# Patient Record
Sex: MALE | Race: BLACK OR AFRICAN AMERICAN | Employment: UNEMPLOYED | URBAN - METROPOLITAN AREA
[De-identification: names, ages, dates, MRNs, and addresses within clinical notes are randomized per-mention and may not be internally consistent; named-entity substitution may affect disease eponyms.]

---

## 2023-01-02 ENCOUNTER — APPOINTMENT (OUTPATIENT)
Dept: GENERAL RADIOLOGY | Age: 53
DRG: 243 | End: 2023-01-02
Payer: COMMERCIAL

## 2023-01-02 ENCOUNTER — HOSPITAL ENCOUNTER (INPATIENT)
Age: 53
LOS: 1 days | Discharge: HOME | DRG: 243 | End: 2023-01-05
Attending: EMERGENCY MEDICINE | Admitting: INTERNAL MEDICINE
Payer: COMMERCIAL

## 2023-01-02 ENCOUNTER — APPOINTMENT (OUTPATIENT)
Dept: CT IMAGING | Age: 53
DRG: 243 | End: 2023-01-02
Payer: COMMERCIAL

## 2023-01-02 DIAGNOSIS — R11.2 INTRACTABLE NAUSEA AND VOMITING: ICD-10-CM

## 2023-01-02 DIAGNOSIS — R06.6 INTRACTABLE HICCUPS: Primary | ICD-10-CM

## 2023-01-02 DIAGNOSIS — E87.6 HYPOKALEMIA: ICD-10-CM

## 2023-01-02 DIAGNOSIS — G96.08 SUBDURAL HYGROMA: ICD-10-CM

## 2023-01-02 DIAGNOSIS — E87.1 HYPONATREMIA: ICD-10-CM

## 2023-01-02 LAB
ALBUMIN SERPL-MCNC: 4.2 G/DL (ref 3.4–5)
ALP BLD-CCNC: 80 U/L (ref 40–129)
ALT SERPL-CCNC: 8 U/L (ref 10–40)
ANION GAP SERPL CALCULATED.3IONS-SCNC: 17 MMOL/L (ref 3–16)
AST SERPL-CCNC: 16 U/L (ref 15–37)
BACTERIA: ABNORMAL /HPF
BASOPHILS ABSOLUTE: 0.1 K/UL (ref 0–0.2)
BASOPHILS RELATIVE PERCENT: 1.3 %
BILIRUB SERPL-MCNC: 0.5 MG/DL (ref 0–1)
BILIRUBIN DIRECT: <0.2 MG/DL (ref 0–0.3)
BILIRUBIN URINE: NEGATIVE
BILIRUBIN, INDIRECT: ABNORMAL MG/DL (ref 0–1)
BLOOD, URINE: NEGATIVE
BUN BLDV-MCNC: 3 MG/DL (ref 7–20)
CALCIUM SERPL-MCNC: 8.8 MG/DL (ref 8.3–10.6)
CHLORIDE BLD-SCNC: 93 MMOL/L (ref 99–110)
CLARITY: CLEAR
CO2: 19 MMOL/L (ref 21–32)
COLOR: YELLOW
CREAT SERPL-MCNC: 0.9 MG/DL (ref 0.9–1.3)
EKG DIAGNOSIS: NORMAL
EKG Q-T INTERVAL: 368 MS
EKG QRS DURATION: 90 MS
EKG QTC CALCULATION (BAZETT): 479 MS
EKG R AXIS: 58 DEGREES
EKG T AXIS: 96 DEGREES
EKG VENTRICULAR RATE: 102 BPM
EOSINOPHILS ABSOLUTE: 0.1 K/UL (ref 0–0.6)
EOSINOPHILS RELATIVE PERCENT: 1 %
EPITHELIAL CELLS, UA: ABNORMAL /HPF (ref 0–5)
GFR SERPL CREATININE-BSD FRML MDRD: >60 ML/MIN/{1.73_M2}
GLUCOSE BLD-MCNC: 108 MG/DL (ref 70–99)
GLUCOSE URINE: NEGATIVE MG/DL
HCT VFR BLD CALC: 41.9 % (ref 40.5–52.5)
HEMOGLOBIN: 13.7 G/DL (ref 13.5–17.5)
KETONES, URINE: NEGATIVE MG/DL
LACTIC ACID: 4.2 MMOL/L (ref 0.4–2)
LEUKOCYTE ESTERASE, URINE: ABNORMAL
LIPASE: 25 U/L (ref 13–60)
LYMPHOCYTES ABSOLUTE: 2.5 K/UL (ref 1–5.1)
LYMPHOCYTES RELATIVE PERCENT: 27.7 %
MAGNESIUM: 2 MG/DL (ref 1.8–2.4)
MCH RBC QN AUTO: 30.5 PG (ref 26–34)
MCHC RBC AUTO-ENTMCNC: 32.8 G/DL (ref 31–36)
MCV RBC AUTO: 93.1 FL (ref 80–100)
MICROSCOPIC EXAMINATION: YES
MONOCYTES ABSOLUTE: 1 K/UL (ref 0–1.3)
MONOCYTES RELATIVE PERCENT: 11.3 %
MUCUS: ABNORMAL /LPF
NEUTROPHILS ABSOLUTE: 5.2 K/UL (ref 1.7–7.7)
NEUTROPHILS RELATIVE PERCENT: 58.7 %
NITRITE, URINE: NEGATIVE
PDW BLD-RTO: 16 % (ref 12.4–15.4)
PH UA: 6 (ref 5–8)
PLATELET # BLD: 302 K/UL (ref 135–450)
PMV BLD AUTO: 8.2 FL (ref 5–10.5)
POTASSIUM REFLEX MAGNESIUM: 3.1 MMOL/L (ref 3.5–5.1)
PRO-BNP: 212 PG/ML (ref 0–124)
PROTEIN UA: NEGATIVE MG/DL
RBC # BLD: 4.5 M/UL (ref 4.2–5.9)
RBC UA: ABNORMAL /HPF (ref 0–4)
SODIUM BLD-SCNC: 129 MMOL/L (ref 136–145)
SPECIFIC GRAVITY UA: <=1.005 (ref 1–1.03)
TOTAL PROTEIN: 8 G/DL (ref 6.4–8.2)
TROPONIN: <0.01 NG/ML
URINE REFLEX TO CULTURE: ABNORMAL
URINE TYPE: ABNORMAL
UROBILINOGEN, URINE: 0.2 E.U./DL
WBC # BLD: 8.9 K/UL (ref 4–11)
WBC UA: ABNORMAL /HPF (ref 0–5)

## 2023-01-02 PROCEDURE — C9113 INJ PANTOPRAZOLE SODIUM, VIA: HCPCS | Performed by: INTERNAL MEDICINE

## 2023-01-02 PROCEDURE — 36415 COLL VENOUS BLD VENIPUNCTURE: CPT

## 2023-01-02 PROCEDURE — G0378 HOSPITAL OBSERVATION PER HR: HCPCS

## 2023-01-02 PROCEDURE — 6370000000 HC RX 637 (ALT 250 FOR IP): Performed by: INTERNAL MEDICINE

## 2023-01-02 PROCEDURE — 80048 BASIC METABOLIC PNL TOTAL CA: CPT

## 2023-01-02 PROCEDURE — 2580000003 HC RX 258: Performed by: PHYSICIAN ASSISTANT

## 2023-01-02 PROCEDURE — 6360000002 HC RX W HCPCS: Performed by: INTERNAL MEDICINE

## 2023-01-02 PROCEDURE — 6360000004 HC RX CONTRAST MEDICATION: Performed by: EMERGENCY MEDICINE

## 2023-01-02 PROCEDURE — 83880 ASSAY OF NATRIURETIC PEPTIDE: CPT

## 2023-01-02 PROCEDURE — 85025 COMPLETE CBC W/AUTO DIFF WBC: CPT

## 2023-01-02 PROCEDURE — 6370000000 HC RX 637 (ALT 250 FOR IP): Performed by: NURSE PRACTITIONER

## 2023-01-02 PROCEDURE — 83690 ASSAY OF LIPASE: CPT

## 2023-01-02 PROCEDURE — 80076 HEPATIC FUNCTION PANEL: CPT

## 2023-01-02 PROCEDURE — 71260 CT THORAX DX C+: CPT

## 2023-01-02 PROCEDURE — 70450 CT HEAD/BRAIN W/O DYE: CPT

## 2023-01-02 PROCEDURE — 71045 X-RAY EXAM CHEST 1 VIEW: CPT

## 2023-01-02 PROCEDURE — 99285 EMERGENCY DEPT VISIT HI MDM: CPT

## 2023-01-02 PROCEDURE — 83735 ASSAY OF MAGNESIUM: CPT

## 2023-01-02 PROCEDURE — 2580000003 HC RX 258: Performed by: INTERNAL MEDICINE

## 2023-01-02 PROCEDURE — 96375 TX/PRO/DX INJ NEW DRUG ADDON: CPT

## 2023-01-02 PROCEDURE — 83605 ASSAY OF LACTIC ACID: CPT

## 2023-01-02 PROCEDURE — 96366 THER/PROPH/DIAG IV INF ADDON: CPT

## 2023-01-02 PROCEDURE — 6360000002 HC RX W HCPCS: Performed by: PHYSICIAN ASSISTANT

## 2023-01-02 PROCEDURE — 81001 URINALYSIS AUTO W/SCOPE: CPT

## 2023-01-02 PROCEDURE — 6370000000 HC RX 637 (ALT 250 FOR IP): Performed by: PHYSICIAN ASSISTANT

## 2023-01-02 PROCEDURE — 93005 ELECTROCARDIOGRAM TRACING: CPT | Performed by: EMERGENCY MEDICINE

## 2023-01-02 PROCEDURE — 84484 ASSAY OF TROPONIN QUANT: CPT

## 2023-01-02 PROCEDURE — 96365 THER/PROPH/DIAG IV INF INIT: CPT

## 2023-01-02 RX ORDER — SUCRALFATE 1 G/1
1 TABLET ORAL EVERY 6 HOURS SCHEDULED
Status: DISCONTINUED | OUTPATIENT
Start: 2023-01-02 | End: 2023-01-05 | Stop reason: HOSPADM

## 2023-01-02 RX ORDER — SODIUM CHLORIDE 0.9 % (FLUSH) 0.9 %
5-40 SYRINGE (ML) INJECTION PRN
Status: DISCONTINUED | OUTPATIENT
Start: 2023-01-02 | End: 2023-01-05 | Stop reason: HOSPADM

## 2023-01-02 RX ORDER — BACLOFEN 10 MG/1
TABLET ORAL
Status: ON HOLD | COMMUNITY
Start: 2022-11-16 | End: 2023-01-05 | Stop reason: HOSPADM

## 2023-01-02 RX ORDER — SODIUM CHLORIDE 9 MG/ML
INJECTION, SOLUTION INTRAVENOUS PRN
Status: DISCONTINUED | OUTPATIENT
Start: 2023-01-02 | End: 2023-01-05 | Stop reason: HOSPADM

## 2023-01-02 RX ORDER — PANTOPRAZOLE SODIUM 40 MG/1
TABLET, DELAYED RELEASE ORAL
Status: ON HOLD | COMMUNITY
Start: 2022-11-16 | End: 2023-01-05 | Stop reason: SDUPTHER

## 2023-01-02 RX ORDER — ENOXAPARIN SODIUM 100 MG/ML
40 INJECTION SUBCUTANEOUS DAILY
Status: DISCONTINUED | OUTPATIENT
Start: 2023-01-03 | End: 2023-01-05 | Stop reason: HOSPADM

## 2023-01-02 RX ORDER — ACETAMINOPHEN 325 MG/1
650 TABLET ORAL EVERY 6 HOURS PRN
Status: DISCONTINUED | OUTPATIENT
Start: 2023-01-02 | End: 2023-01-05 | Stop reason: HOSPADM

## 2023-01-02 RX ORDER — POTASSIUM CHLORIDE 7.45 MG/ML
10 INJECTION INTRAVENOUS PRN
Status: DISCONTINUED | OUTPATIENT
Start: 2023-01-02 | End: 2023-01-05 | Stop reason: HOSPADM

## 2023-01-02 RX ORDER — POTASSIUM CHLORIDE 20 MEQ/1
40 TABLET, EXTENDED RELEASE ORAL PRN
Status: DISCONTINUED | OUTPATIENT
Start: 2023-01-02 | End: 2023-01-05 | Stop reason: HOSPADM

## 2023-01-02 RX ORDER — SODIUM CHLORIDE, SODIUM LACTATE, POTASSIUM CHLORIDE, AND CALCIUM CHLORIDE .6; .31; .03; .02 G/100ML; G/100ML; G/100ML; G/100ML
1000 INJECTION, SOLUTION INTRAVENOUS ONCE
Status: COMPLETED | OUTPATIENT
Start: 2023-01-02 | End: 2023-01-02

## 2023-01-02 RX ORDER — POLYETHYLENE GLYCOL 3350 17 G/17G
17 POWDER, FOR SOLUTION ORAL DAILY PRN
Status: DISCONTINUED | OUTPATIENT
Start: 2023-01-02 | End: 2023-01-05 | Stop reason: HOSPADM

## 2023-01-02 RX ORDER — ACETAMINOPHEN 650 MG/1
650 SUPPOSITORY RECTAL EVERY 6 HOURS PRN
Status: DISCONTINUED | OUTPATIENT
Start: 2023-01-02 | End: 2023-01-05 | Stop reason: HOSPADM

## 2023-01-02 RX ORDER — PANTOPRAZOLE SODIUM 40 MG/10ML
40 INJECTION, POWDER, LYOPHILIZED, FOR SOLUTION INTRAVENOUS 2 TIMES DAILY
Status: DISCONTINUED | OUTPATIENT
Start: 2023-01-02 | End: 2023-01-05 | Stop reason: HOSPADM

## 2023-01-02 RX ORDER — SODIUM CHLORIDE, SODIUM LACTATE, POTASSIUM CHLORIDE, CALCIUM CHLORIDE 600; 310; 30; 20 MG/100ML; MG/100ML; MG/100ML; MG/100ML
INJECTION, SOLUTION INTRAVENOUS CONTINUOUS
Status: DISCONTINUED | OUTPATIENT
Start: 2023-01-02 | End: 2023-01-05 | Stop reason: HOSPADM

## 2023-01-02 RX ORDER — AMLODIPINE BESYLATE 10 MG/1
TABLET ORAL
Status: ON HOLD | COMMUNITY
Start: 2022-10-03 | End: 2023-01-05 | Stop reason: HOSPADM

## 2023-01-02 RX ORDER — MAGNESIUM SULFATE IN WATER 40 MG/ML
2000 INJECTION, SOLUTION INTRAVENOUS PRN
Status: DISCONTINUED | OUTPATIENT
Start: 2023-01-02 | End: 2023-01-05 | Stop reason: HOSPADM

## 2023-01-02 RX ORDER — ONDANSETRON 2 MG/ML
4 INJECTION INTRAMUSCULAR; INTRAVENOUS EVERY 6 HOURS PRN
Status: DISCONTINUED | OUTPATIENT
Start: 2023-01-02 | End: 2023-01-05 | Stop reason: HOSPADM

## 2023-01-02 RX ORDER — BACLOFEN 5 MG/1
5 TABLET ORAL ONCE
Status: COMPLETED | OUTPATIENT
Start: 2023-01-02 | End: 2023-01-02

## 2023-01-02 RX ORDER — POTASSIUM CHLORIDE 20 MEQ/1
40 TABLET, EXTENDED RELEASE ORAL ONCE
Status: COMPLETED | OUTPATIENT
Start: 2023-01-02 | End: 2023-01-02

## 2023-01-02 RX ORDER — SODIUM CHLORIDE 0.9 % (FLUSH) 0.9 %
5-40 SYRINGE (ML) INJECTION EVERY 12 HOURS SCHEDULED
Status: DISCONTINUED | OUTPATIENT
Start: 2023-01-02 | End: 2023-01-05 | Stop reason: HOSPADM

## 2023-01-02 RX ORDER — ONDANSETRON 4 MG/1
4 TABLET, ORALLY DISINTEGRATING ORAL EVERY 8 HOURS PRN
Status: DISCONTINUED | OUTPATIENT
Start: 2023-01-02 | End: 2023-01-05 | Stop reason: HOSPADM

## 2023-01-02 RX ORDER — VALSARTAN 160 MG/1
TABLET ORAL
Status: ON HOLD | COMMUNITY
Start: 2022-10-03 | End: 2023-01-05 | Stop reason: HOSPADM

## 2023-01-02 RX ORDER — BACLOFEN 10 MG/1
5 TABLET ORAL 3 TIMES DAILY
Status: DISCONTINUED | OUTPATIENT
Start: 2023-01-02 | End: 2023-01-05 | Stop reason: HOSPADM

## 2023-01-02 RX ADMIN — BACLOFEN 5 MG: 5 TABLET ORAL at 18:48

## 2023-01-02 RX ADMIN — BACLOFEN 5 MG: 10 TABLET ORAL at 22:45

## 2023-01-02 RX ADMIN — IOPAMIDOL 75 ML: 755 INJECTION, SOLUTION INTRAVENOUS at 16:56

## 2023-01-02 RX ADMIN — CHLORPROMAZINE HYDROCHLORIDE 50 MG: 25 INJECTION INTRAMUSCULAR at 16:20

## 2023-01-02 RX ADMIN — SODIUM CHLORIDE, POTASSIUM CHLORIDE, SODIUM LACTATE AND CALCIUM CHLORIDE: 600; 310; 30; 20 INJECTION, SOLUTION INTRAVENOUS at 22:35

## 2023-01-02 RX ADMIN — SODIUM CHLORIDE, PRESERVATIVE FREE 10 ML: 5 INJECTION INTRAVENOUS at 22:36

## 2023-01-02 RX ADMIN — SUCRALFATE 1 G: 1 TABLET ORAL at 20:58

## 2023-01-02 RX ADMIN — SODIUM CHLORIDE, POTASSIUM CHLORIDE, SODIUM LACTATE AND CALCIUM CHLORIDE 1000 ML: 600; 310; 30; 20 INJECTION, SOLUTION INTRAVENOUS at 16:20

## 2023-01-02 RX ADMIN — PANTOPRAZOLE SODIUM 40 MG: 40 INJECTION, POWDER, LYOPHILIZED, FOR SOLUTION INTRAVENOUS at 22:45

## 2023-01-02 RX ADMIN — POTASSIUM CHLORIDE 40 MEQ: 1500 TABLET, EXTENDED RELEASE ORAL at 20:58

## 2023-01-02 ASSESSMENT — ENCOUNTER SYMPTOMS
VOMITING: 1
COUGH: 0
CONSTIPATION: 0
ABDOMINAL PAIN: 1
NAUSEA: 1
RESPIRATORY NEGATIVE: 1
SHORTNESS OF BREATH: 0
DIARRHEA: 0

## 2023-01-02 ASSESSMENT — PAIN - FUNCTIONAL ASSESSMENT: PAIN_FUNCTIONAL_ASSESSMENT: 0-10

## 2023-01-02 ASSESSMENT — PAIN DESCRIPTION - ORIENTATION: ORIENTATION: LEFT

## 2023-01-02 ASSESSMENT — PAIN DESCRIPTION - LOCATION: LOCATION: ABDOMEN;CHEST

## 2023-01-02 ASSESSMENT — PAIN SCALES - GENERAL: PAINLEVEL_OUTOF10: 6

## 2023-01-02 ASSESSMENT — PAIN DESCRIPTION - DESCRIPTORS: DESCRIPTORS: ACHING;BURNING;DISCOMFORT

## 2023-01-02 NOTE — ED PROVIDER NOTES
[address] ENCOUNTER          PHYSICIAN ASSISTANT NOTE       Date of evaluation: 1/2/2023    Chief Complaint     Emesis (Presents to ED with n/v on going for the for about 6months. States has been evaluated for before. States when lays down and gets up will become nauseated and throw up. ) and Chest Pain (Intermittent radiating pain in to left arm that Woke pt. Up last evening with nausea )      History of Present Illness     Caden Castro is a 50 y.o. male who presents for hiccups. Patient has had hiccups ongoing for months. Patient has associated nausea, vomiting, chest pain, abdominal pain. Patient has been seen for this in the past and was admitted in November and had an esophagram that showed a possible stricture. GI then did an EGD which showed esophagitis, small hiatal hernia, gastric ulcer without bleeding. The patient was instructed to take a PPI which she reports he is taking. Patient reports despite this the symptoms have never improved. HPI is limited due to medical condition. Review of Systems     Review of Systems   Constitutional: Negative. Negative for fever. Respiratory: Negative. Negative for cough and shortness of breath. Cardiovascular:  Positive for chest pain. Gastrointestinal:  Positive for abdominal pain, nausea and vomiting. Negative for constipation and diarrhea. Musculoskeletal: Negative. Skin: Negative. Neurological: Negative. Psychiatric/Behavioral: Negative. All other systems reviewed and are negative. Past Medical, Surgical, Family, and Social History     He has no past medical history on file. He has no past surgical history on file. His family history is not on file. He     Medications     Previous Medications    No medications on file       Allergies     He has No Known Allergies.     Physical Exam     INITIAL VITALS: BP: (!) 180/121, Temp: 98.6 °F (37 °C), Heart Rate: (!) 104, Resp: 26, SpO2: 99 %  Physical Exam  Vitals and nursing note reviewed. Constitutional:       Appearance: He is ill-appearing. HENT:      Head: Normocephalic and atraumatic. Cardiovascular:      Rate and Rhythm: Regular rhythm. Tachycardia present. Pulmonary:      Effort: Pulmonary effort is normal.      Breath sounds: Normal breath sounds. Abdominal:      Palpations: Abdomen is soft. Tenderness: There is no rebound. Comments: Tender epigastric region   Musculoskeletal:      Cervical back: Neck supple. Skin:     General: Skin is warm and dry. Neurological:      General: No focal deficit present. Mental Status: He is alert and oriented to person, place, and time. Mental status is at baseline. Psychiatric:         Mood and Affect: Mood normal.       Diagnostic Results     EKG   Interpreted in conjunction with emergency department physician Clemencia Shone, *  Rhythm: normal sinus   Rate: normal  Axis: normal  Ectopy: none  Conduction: normal  ST Segments: no acute change  T Waves: no acute change  Q Waves:nonspecific  Clinical Impression: non-specific EKG  Comparison:  NSR with significant artifact from intractable hiccups    RADIOLOGY:  XR CHEST PORTABLE   Final Result   1. No acute cardiopulmonary findings. CT Head W/O Contrast    (Results Pending)   CT CHEST ABDOMEN PELVIS W CONTRAST Additional Contrast? None    (Results Pending)       LABS:   Results for orders placed or performed during the hospital encounter of 01/02/23   BMP w/ Reflex to MG   Result Value Ref Range    Sodium 129 (L) 136 - 145 mmol/L    Potassium reflex Magnesium 3.1 (L) 3.5 - 5.1 mmol/L    Chloride 93 (L) 99 - 110 mmol/L    CO2 19 (L) 21 - 32 mmol/L    Anion Gap 17 (H) 3 - 16    Glucose 108 (H) 70 - 99 mg/dL    BUN 3 (L) 7 - 20 mg/dL    Creatinine 0.9 0.9 - 1.3 mg/dL    Est, Glom Filt Rate >60 >60    Calcium 8.8 8.3 - 10.6 mg/dL   CBC with Auto Differential   Result Value Ref Range    WBC 8.9 4.0 - 11.0 K/uL    RBC 4.50 4. 20 - 5.90 M/uL    Hemoglobin 13.7 13.5 - 17.5 g/dL    Hematocrit 41.9 40.5 - 52.5 %    MCV 93.1 80.0 - 100.0 fL    MCH 30.5 26.0 - 34.0 pg    MCHC 32.8 31.0 - 36.0 g/dL    RDW 16.0 (H) 12.4 - 15.4 %    Platelets 217 206 - 298 K/uL    MPV 8.2 5.0 - 10.5 fL    Neutrophils % 58.7 %    Lymphocytes % 27.7 %    Monocytes % 11.3 %    Eosinophils % 1.0 %    Basophils % 1.3 %    Neutrophils Absolute 5.2 1.7 - 7.7 K/uL    Lymphocytes Absolute 2.5 1.0 - 5.1 K/uL    Monocytes Absolute 1.0 0.0 - 1.3 K/uL    Eosinophils Absolute 0.1 0.0 - 0.6 K/uL    Basophils Absolute 0.1 0.0 - 0.2 K/uL   Hepatic Function Panel   Result Value Ref Range    Total Protein 8.0 6.4 - 8.2 g/dL    Albumin 4.2 3.4 - 5.0 g/dL    Alkaline Phosphatase 80 40 - 129 U/L    ALT 8 (L) 10 - 40 U/L    AST 16 15 - 37 U/L    Total Bilirubin 0.5 0.0 - 1.0 mg/dL    Bilirubin, Direct <0.2 0.0 - 0.3 mg/dL    Bilirubin, Indirect see below 0.0 - 1.0 mg/dL   Lipase   Result Value Ref Range    Lipase 25.0 13.0 - 60.0 U/L   Lactic Acid   Result Value Ref Range    Lactic Acid 4.2 (HH) 0.4 - 2.0 mmol/L   Troponin   Result Value Ref Range    Troponin <0.01 <0.01 ng/mL   Urinalysis with Reflex to Culture    Specimen: Urine   Result Value Ref Range    Urine Type NotGiven    Brain Natriuretic Peptide   Result Value Ref Range    Pro- (H) 0 - 124 pg/mL   Magnesium   Result Value Ref Range    Magnesium 2.00 1.80 - 2.40 mg/dL   EKG 12 Lead   Result Value Ref Range    Ventricular Rate 102 BPM    QRS Duration 90 ms    Q-T Interval 368 ms    QTc Calculation (Bazett) 479 ms    R Axis 58 degrees    T Axis 96 degrees    Diagnosis       EKG performed in ER and to be interpreted by ER physician. Confirmed by MD, ER ([id number]),  Alba Holland ([phone]) on 1/2/2023 3:53:12 PM       ED BEDSIDE ULTRASOUND:  No results found.     RECENT VITALS:  BP: (!) 180/121, Temp: 98.6 °F (37 °C), Heart Rate: (!) 104, Resp: 26, SpO2: 100 %     Procedures         ED Course     Nursing Notes, Past Medical Hx,Past Surgical Hx, Social Hx, Allergies, and Family Hx were reviewed. The patient was given the following medications:  Orders Placed This Encounter   Medications    chlorproMAZINE (THORAZINE) 50 mg in sodium chloride 0.9 % 50 mL IVPB    lactated ringers bolus    potassium chloride (KLOR-CON M) extended release tablet 40 mEq       CONSULTS:  None    MEDICAL DECISION MAKING / ASSESSMENT / Ree Tobias is a 50 y.o. male with intractable hiccups. Patient appears very uncomfortable. Patient is tachycardic and hypertensive. Oxygenating well on room air. Lungs clear. Epigastric tenderness on exam without peritoneal signs. Thorazine ordered. Patient is able blood counts. Patient has a stable creatinine but has hyponatremia and hypokalemia as well as low chlorine and bicarb on BMP, anion gap of 17. Patient given fluids and oral potassium. Lactic acid is elevated, will recheck after fluids. LFTs and lipase normal. Troponin negative. CT head, chest, abdomen/pelvis ordered. At this time I am going off service and the oncoming provider will follow-up on pending results and disposition the patient. This patient was also evaluated by the attending physician. All care plans were discussed and agreed upon. Clinical Impression     1. Intractable hiccups        Disposition     PATIENT REFERRED TO:  No follow-up provider specified.     DISCHARGE MEDICATIONS:  New Prescriptions    No medications on file       DISPOSITION  pending        Rachna Mckeon PA-C  01/02/23 1258

## 2023-01-02 NOTE — ED PROVIDER NOTES
[address] ENCOUNTER          NURSE PRACTITIONER NOTE       Date of evaluation: 1/2/2023    ADDENDUM:      Care of this patient was assumed from [place], [place]. The patient was seen for Emesis (Presents to ED with n/v on going for the for about 6months. States has been evaluated for before. States when lays down and gets up will become nauseated and throw up. ) and Chest Pain (Intermittent radiating pain in to left arm that Woke pt. Up last evening with nausea )  . The patient's initial evaluation and plan have been discussed with the prior provider who initially evaluated the patient. Nursing Notes, Past Medical Hx, Past Surgical Hx, Social Hx, Allergies, and Family Hx were all reviewed. Diagnostic Results     EKG   See previous providers note    RADIOLOGY:  CT CHEST ABDOMEN PELVIS W CONTRAST Additional Contrast? None   Final Result   1. A large left inguinal hernia is present which contains a portion of sigmoid colon. 2.  A fat-containing umbilical hernia is present. 3.  Fluid is present within the esophagus which may be due to gastroesophageal reflux disease. CT Head W/O Contrast   Final Result   1. No acute intracranial hemorrhage or mass effect. 2.  A subdural hygromas present over the right cerebral convexity. XR CHEST PORTABLE   Final Result   1. No acute cardiopulmonary findings.           LABS:   Results for orders placed or performed during the hospital encounter of 01/02/23   BMP w/ Reflex to MG   Result Value Ref Range    Sodium 129 (L) 136 - 145 mmol/L    Potassium reflex Magnesium 3.1 (L) 3.5 - 5.1 mmol/L    Chloride 93 (L) 99 - 110 mmol/L    CO2 19 (L) 21 - 32 mmol/L    Anion Gap 17 (H) 3 - 16    Glucose 108 (H) 70 - 99 mg/dL    BUN 3 (L) 7 - 20 mg/dL    Creatinine 0.9 0.9 - 1.3 mg/dL    Est, Glom Filt Rate >60 >60    Calcium 8.8 8.3 - 10.6 mg/dL   CBC with Auto Differential   Result Value Ref Range    WBC 8.9 4.0 - 11.0 K/uL    RBC 4.50 4.20 - 5.90 M/uL    Hemoglobin 13.7 13.5 - 17.5 g/dL    Hematocrit 41.9 40.5 - 52.5 %    MCV 93.1 80.0 - 100.0 fL    MCH 30.5 26.0 - 34.0 pg    MCHC 32.8 31.0 - 36.0 g/dL    RDW 16.0 (H) 12.4 - 15.4 %    Platelets 623 623 - 098 K/uL    MPV 8.2 5.0 - 10.5 fL    Neutrophils % 58.7 %    Lymphocytes % 27.7 %    Monocytes % 11.3 %    Eosinophils % 1.0 %    Basophils % 1.3 %    Neutrophils Absolute 5.2 1.7 - 7.7 K/uL    Lymphocytes Absolute 2.5 1.0 - 5.1 K/uL    Monocytes Absolute 1.0 0.0 - 1.3 K/uL    Eosinophils Absolute 0.1 0.0 - 0.6 K/uL    Basophils Absolute 0.1 0.0 - 0.2 K/uL   Hepatic Function Panel   Result Value Ref Range    Total Protein 8.0 6.4 - 8.2 g/dL    Albumin 4.2 3.4 - 5.0 g/dL    Alkaline Phosphatase 80 40 - 129 U/L    ALT 8 (L) 10 - 40 U/L    AST 16 15 - 37 U/L    Total Bilirubin 0.5 0.0 - 1.0 mg/dL    Bilirubin, Direct <0.2 0.0 - 0.3 mg/dL    Bilirubin, Indirect see below 0.0 - 1.0 mg/dL   Lipase   Result Value Ref Range    Lipase 25.0 13.0 - 60.0 U/L   Lactic Acid   Result Value Ref Range    Lactic Acid 4.2 (HH) 0.4 - 2.0 mmol/L   Troponin   Result Value Ref Range    Troponin <0.01 <0.01 ng/mL   Urinalysis with Reflex to Culture    Specimen: Urine   Result Value Ref Range    Color, UA Yellow Straw/Yellow    Clarity, UA Clear Clear    Glucose, Ur Negative Negative mg/dL    Bilirubin Urine Negative Negative    Ketones, Urine Negative Negative mg/dL    Specific Gravity, UA <=1.005 1.005 - 1.030    Blood, Urine Negative Negative    pH, UA 6.0 5.0 - 8.0    Protein, UA Negative Negative mg/dL    Urobilinogen, Urine 0.2 <2.0 E.U./dL    Nitrite, Urine Negative Negative    Leukocyte Esterase, Urine TRACE (A) Negative    Microscopic Examination YES     Urine Type NotGiven     Urine Reflex to Culture Not Indicated    Brain Natriuretic Peptide   Result Value Ref Range    Pro- (H) 0 - 124 pg/mL   Magnesium   Result Value Ref Range    Magnesium 2.00 1.80 - 2.40 mg/dL   Microscopic Urinalysis   Result Value Ref Range    Mucus, UA 1+ (A) None Seen /LPF    WBC, UA 0-2 0 - 5 /HPF    RBC, UA None seen 0 - 4 /HPF    Epithelial Cells, UA 2-5 0 - 5 /HPF    Bacteria, UA 1+ (A) None Seen /HPF   EKG 12 Lead   Result Value Ref Range    Ventricular Rate 102 BPM    QRS Duration 90 ms    Q-T Interval 368 ms    QTc Calculation (Bazett) 479 ms    R Axis 58 degrees    T Axis 96 degrees    Diagnosis       EKG performed in ER and to be interpreted by ER physician. Confirmed by MD, ER ([id number]),  Penny Covarrubias ([phone]) on 1/2/2023 3:53:12 PM       ED BEDSIDE ULTRASOUND:  No results found. RECENT VITALS:  BP: (!) 180/121, Temp: 98.6 °F (37 °C), Heart Rate: (!) 104, Resp: 26, SpO2: 100 %       ED Course          The patient was given the following medications:  Orders Placed This Encounter   Medications    chlorproMAZINE (THORAZINE) 50 mg in sodium chloride 0.9 % 50 mL IVPB    lactated ringers bolus    potassium chloride (KLOR-CON M) extended release tablet 40 mEq    iopamidol (ISOVUE-370) 76 % injection 75 mL    Baclofen (LIORESAL) tablet 5 mg       CONSULTS:  IP CONSULT TO HOSPITALIST    MEDICAL DECISION MAKING / ASSESSMENT / Omid Bowen is a 50 y.o. male who presented to the emerged department for evaluation of intractable hiccups x6 months, with associated left-sided chest pain, decreased oral intake with ongoing nausea and vomiting. Patient was admitted for this at an outside facility on 11/16/2022 at which time he had an EGD done with GI, was noted to have esophagitis and started on appropriate medications. Patient continues to have symptoms. On laboratory evaluation here patient is noted to have multiple electrolyte derangements including sodium of 129, potassium of 3.1, CO2 of 19, anion gap of 17. He was provided with IV fluids, given 40 mill equivalents of potassium, and Thorazine injection for treatment of intractable hiccups. CT scans of the head, chest abdomen and pelvis were ordered for further evaluation. Laboratory evaluations otherwise reassuring. Patient was turned over to me pending reassessment/symptom control, CT findings and ultimate disposition. Head CT with findings for subdural hygroma otherwise unremarkable  CT scans of the chest/abdomen/pelvis show a large left inguinal hernia which contains a portion of the sigmoid colon, is fat-containing umbilical hernia, fluid present within the esophagus which may be related to GERD. On reassessment, patient did not have any relief from the Thorazine. On chart review, appears to have had relief at [facility] with baclofen in the past; was ordered this. At this point I feel patient warrants admission for ongoing evaluation management of intractable hiccups,    Patient will be admitted to the hospitalist service for ongoing evaluation and management of intractable hiccups, intractable nausea vomiting, electrolyte derangements, subdural hygroma. This patient was also evaluated by the attending physician. All care plans were discussed and agreed upon. Clinical Impression     1. Intractable hiccups    2. Intractable nausea and vomiting    3. Hyponatremia    4. Hypokalemia    5.  Subdural hygroma        Disposition       DISPOSITION Decision To Admit 01/02/2023 06:14:46 PM        EDY Rodriguez CNP  01/02/23 1942

## 2023-01-02 NOTE — ED PROVIDER NOTES
ED Attending Attestation Note     Date of evaluation: 1/2/2023    This patient was seen by the advance practice provider. I have seen and examined the patient, agree with the workup, evaluation, management and diagnosis. The care plan has been discussed. I have reviewed the ECG and concur with the [facility]'s interpretation. My assessment reveals adult male who presents with persistent hiccups, nausea especially when he lays down reported inability to take p.o. either solids or liquids for the last 3 days. His abdomen is soft and benign, but he does appear clinically dry, and he was noted to have multiple electrolyte derangements that would support this. We did scan his head given his reported persistent nausea but no obvious acute findings are noted. Is a nonobstructed inguinal hernia and does not appear obstructed on his abdominal exam or in his general symptoms. He remains unable to take any significant p.o for any length of time, and subsequently I do not think he would be a good candidate for electrolyte repletion and rehydration at home.          Judson Li MD  01/02/23 4982

## 2023-01-03 LAB
A/G RATIO: 1.1 (ref 1.1–2.2)
ALBUMIN SERPL-MCNC: 3.6 G/DL (ref 3.4–5)
ALP BLD-CCNC: 71 U/L (ref 40–129)
ALT SERPL-CCNC: 8 U/L (ref 10–40)
ANION GAP SERPL CALCULATED.3IONS-SCNC: 11 MMOL/L (ref 3–16)
AST SERPL-CCNC: 16 U/L (ref 15–37)
BASOPHILS ABSOLUTE: 0.1 K/UL (ref 0–0.2)
BASOPHILS RELATIVE PERCENT: 1.6 %
BILIRUB SERPL-MCNC: 1 MG/DL (ref 0–1)
BUN BLDV-MCNC: 4 MG/DL (ref 7–20)
CALCIUM SERPL-MCNC: 8.9 MG/DL (ref 8.3–10.6)
CHLORIDE BLD-SCNC: 101 MMOL/L (ref 99–110)
CO2: 25 MMOL/L (ref 21–32)
CREAT SERPL-MCNC: 1 MG/DL (ref 0.9–1.3)
EOSINOPHILS ABSOLUTE: 0.1 K/UL (ref 0–0.6)
EOSINOPHILS RELATIVE PERCENT: 2.2 %
GFR SERPL CREATININE-BSD FRML MDRD: >60 ML/MIN/{1.73_M2}
GLUCOSE BLD-MCNC: 103 MG/DL (ref 70–99)
HCT VFR BLD CALC: 38.7 % (ref 40.5–52.5)
HEMOGLOBIN: 12.7 G/DL (ref 13.5–17.5)
LACTIC ACID: 2 MMOL/L (ref 0.4–2)
LYMPHOCYTES ABSOLUTE: 1.7 K/UL (ref 1–5.1)
LYMPHOCYTES RELATIVE PERCENT: 29.5 %
MCH RBC QN AUTO: 30.3 PG (ref 26–34)
MCHC RBC AUTO-ENTMCNC: 32.8 G/DL (ref 31–36)
MCV RBC AUTO: 92.5 FL (ref 80–100)
MONOCYTES ABSOLUTE: 0.7 K/UL (ref 0–1.3)
MONOCYTES RELATIVE PERCENT: 12.3 %
NEUTROPHILS ABSOLUTE: 3.2 K/UL (ref 1.7–7.7)
NEUTROPHILS RELATIVE PERCENT: 54.4 %
PDW BLD-RTO: 16.3 % (ref 12.4–15.4)
PLATELET # BLD: 249 K/UL (ref 135–450)
PMV BLD AUTO: 8 FL (ref 5–10.5)
POTASSIUM REFLEX MAGNESIUM: 4.2 MMOL/L (ref 3.5–5.1)
RBC # BLD: 4.18 M/UL (ref 4.2–5.9)
SODIUM BLD-SCNC: 137 MMOL/L (ref 136–145)
TOTAL PROTEIN: 6.8 G/DL (ref 6.4–8.2)
WBC # BLD: 5.9 K/UL (ref 4–11)

## 2023-01-03 PROCEDURE — G0378 HOSPITAL OBSERVATION PER HR: HCPCS

## 2023-01-03 PROCEDURE — 6370000000 HC RX 637 (ALT 250 FOR IP): Performed by: INTERNAL MEDICINE

## 2023-01-03 PROCEDURE — 83605 ASSAY OF LACTIC ACID: CPT

## 2023-01-03 PROCEDURE — 85025 COMPLETE CBC W/AUTO DIFF WBC: CPT

## 2023-01-03 PROCEDURE — 2580000003 HC RX 258: Performed by: INTERNAL MEDICINE

## 2023-01-03 PROCEDURE — 96376 TX/PRO/DX INJ SAME DRUG ADON: CPT

## 2023-01-03 PROCEDURE — 80053 COMPREHEN METABOLIC PANEL: CPT

## 2023-01-03 PROCEDURE — C9113 INJ PANTOPRAZOLE SODIUM, VIA: HCPCS | Performed by: INTERNAL MEDICINE

## 2023-01-03 PROCEDURE — 96372 THER/PROPH/DIAG INJ SC/IM: CPT

## 2023-01-03 PROCEDURE — 36415 COLL VENOUS BLD VENIPUNCTURE: CPT

## 2023-01-03 PROCEDURE — 6360000002 HC RX W HCPCS: Performed by: INTERNAL MEDICINE

## 2023-01-03 RX ORDER — AMLODIPINE BESYLATE 10 MG/1
10 TABLET ORAL DAILY
Status: DISCONTINUED | OUTPATIENT
Start: 2023-01-03 | End: 2023-01-05 | Stop reason: HOSPADM

## 2023-01-03 RX ORDER — CHLORPROMAZINE HYDROCHLORIDE 25 MG/1
25 TABLET, FILM COATED ORAL 3 TIMES DAILY
Status: DISCONTINUED | OUTPATIENT
Start: 2023-01-03 | End: 2023-01-05 | Stop reason: HOSPADM

## 2023-01-03 RX ADMIN — SUCRALFATE 1 G: 1 TABLET ORAL at 12:35

## 2023-01-03 RX ADMIN — CHLORPROMAZINE HYDROCHLORIDE 25 MG: 25 TABLET, FILM COATED ORAL at 16:05

## 2023-01-03 RX ADMIN — PANTOPRAZOLE SODIUM 40 MG: 40 INJECTION, POWDER, LYOPHILIZED, FOR SOLUTION INTRAVENOUS at 22:02

## 2023-01-03 RX ADMIN — BACLOFEN 5 MG: 10 TABLET ORAL at 22:01

## 2023-01-03 RX ADMIN — PANTOPRAZOLE SODIUM 40 MG: 40 INJECTION, POWDER, LYOPHILIZED, FOR SOLUTION INTRAVENOUS at 08:20

## 2023-01-03 RX ADMIN — SODIUM CHLORIDE, POTASSIUM CHLORIDE, SODIUM LACTATE AND CALCIUM CHLORIDE: 600; 310; 30; 20 INJECTION, SOLUTION INTRAVENOUS at 05:32

## 2023-01-03 RX ADMIN — AMLODIPINE BESYLATE 10 MG: 10 TABLET ORAL at 09:15

## 2023-01-03 RX ADMIN — SUCRALFATE 1 G: 1 TABLET ORAL at 01:12

## 2023-01-03 RX ADMIN — CHLORPROMAZINE HYDROCHLORIDE 25 MG: 25 TABLET, FILM COATED ORAL at 12:23

## 2023-01-03 RX ADMIN — SODIUM CHLORIDE, PRESERVATIVE FREE 10 ML: 5 INJECTION INTRAVENOUS at 22:03

## 2023-01-03 RX ADMIN — SUCRALFATE 1 G: 1 TABLET ORAL at 18:18

## 2023-01-03 RX ADMIN — SODIUM CHLORIDE, PRESERVATIVE FREE 10 ML: 5 INJECTION INTRAVENOUS at 08:21

## 2023-01-03 RX ADMIN — CHLORPROMAZINE HYDROCHLORIDE 25 MG: 25 TABLET, FILM COATED ORAL at 22:01

## 2023-01-03 RX ADMIN — BACLOFEN 5 MG: 10 TABLET ORAL at 08:19

## 2023-01-03 RX ADMIN — SUCRALFATE 1 G: 1 TABLET ORAL at 05:33

## 2023-01-03 RX ADMIN — SODIUM CHLORIDE, POTASSIUM CHLORIDE, SODIUM LACTATE AND CALCIUM CHLORIDE: 600; 310; 30; 20 INJECTION, SOLUTION INTRAVENOUS at 19:31

## 2023-01-03 RX ADMIN — ENOXAPARIN SODIUM 40 MG: 100 INJECTION SUBCUTANEOUS at 08:20

## 2023-01-03 RX ADMIN — BACLOFEN 5 MG: 10 TABLET ORAL at 14:47

## 2023-01-03 ASSESSMENT — PAIN SCALES - GENERAL: PAINLEVEL_OUTOF10: 3

## 2023-01-03 ASSESSMENT — PAIN DESCRIPTION - LOCATION: LOCATION: ABDOMEN

## 2023-01-03 ASSESSMENT — PAIN DESCRIPTION - ORIENTATION: ORIENTATION: MID

## 2023-01-03 ASSESSMENT — PAIN DESCRIPTION - DESCRIPTORS: DESCRIPTORS: ACHING

## 2023-01-03 NOTE — PROGRESS NOTES
4 Eyes Skin Assessment     NAME:  Ginger Zuluaga  YOB: 1970  MEDICAL RECORD NUMBER:  [id number]    The patient is being assessed for  Admission    I agree that One RN have performed a thorough Head to Toe Skin Assessment on the patient. ALL assessment sites listed below have been assessed. Areas assessed by both nurses:    Head, Face, Ears, Shoulders, Back, Chest, Arms, Elbows, Hands, Sacrum. Buttock, Coccyx, Ischium, Legs. Feet and Heels, and Other ***        Does the Patient have a Wound?  No noted wound(s)       Socrates Prevention initiated by RN: Yes   Wound Care Orders initiated by RN: NA    Pressure Injury (Stage 3,4, Unstageable, DTI, NWPT, and Complex wounds) if present place referral order by RN under : No    New and Established Ostomies, if present place, referral order under : No      Nurse 1 eSignature: Electronically signed by Kourtney Bowen RN on 1/3/23 at 2:52 AM EST    **SHARE this note so that the co-signing nurse is able to place an eSignature**    Nurse 2 eSignature: {Esignature:[id number]}

## 2023-01-03 NOTE — CARE COORDINATION
Adam Thorpe, [address] because patient stated that his [employer] insurance  on . Roxy Hannah is checking and calling back. Electronically signed by Jhonny Snow RN on 1/3/2023 at 3:04 PM    Addendum:  Call from Roxy Hannah, [address]. Patient has [employer] until 23 and then, it will turnover on 23. Patient is from home alone. His son and mother live around 2 miles from patient. Patient states, at this time, he does not need any services at discharge. CM will continue to follow patient until discharge. Electronically signed by Jhonny Snow RN on 1/3/2023 at 3:12 PM    Addendum:   Spoke to patient and let him know that his insurance is still covering him. He does not drive and says he needs a ride home at discharge and his family works. Reassured patient that we can provide a LYFT at discharge to get him home. CM will continue to follow patient until discharge.   Electronically signed by Jhonny Snow RN on 1/3/2023 at 3:28 PM

## 2023-01-03 NOTE — PROGRESS NOTES
Progress Note  Admit Date: 1/2/2023       Overnight Events: none noted     CC: F/U for hiccups, n/v, esophagitis  Interval History: feels a little better today, asking for food. Gi consult pending. amLODIPine  10 mg Oral Daily    sodium chloride flush  5-40 mL IntraVENous 2 times per day    enoxaparin  40 mg SubCUTAneous Daily    pantoprazole  40 mg IntraVENous BID    baclofen  5 mg Oral TID    sucralfate  1 g Oral 4 times per day      PRN Medications: sodium chloride flush, sodium chloride, ondansetron **OR** ondansetron, polyethylene glycol, acetaminophen **OR** acetaminophen, potassium chloride **OR** potassium alternative oral replacement **OR** potassium chloride, magnesium sulfate  Diet: ADULT DIET; Clear Liquid  Continuous Infusions:   sodium chloride      lactated ringers 150 mL/hr at 01/03/23 0532     PHYSICAL EXAM:  BP (!) 156/103   Pulse 99   Temp 98.3 °F (36.8 °C) (Oral)   Resp 16   Ht 6' 2.02\" (1.88 m)   Wt 194 lb 14.2 oz (88.4 kg)   SpO2 96%   BMI 25.01 kg/m²   No results for input(s): POCGLU in the last 72 hours. Intake/Output Summary (Last 24 hours) at 1/3/2023 0951  Last data filed at 1/3/2023 4274  Gross per 24 hour   Intake 120 ml   Output 1600 ml   Net -1480 ml       General appearance:  No apparent distress, appears stated age and cooperative. HEENT:  Normal cephalic, atraumatic without obvious deformity. Pupils equal, round, and reactive to light. Extra ocular muscles intact. Conjunctivae/corneas clear. Neck: Supple, with full range of motion. No jugular venous distention. Trachea midline. Respiratory:  Normal respiratory effort. Clear to auscultation, bilaterally without Rales/Wheezes/Rhonchi. Cardiovascular:  Regular rate and rhythm with normal S1/S2 without murmurs, rubs or gallops. Abdomen: Soft, non-tender, non-distended with normal bowel sounds. Musculoskeletal:  No clubbing, cyanosis or edema bilaterally. Full range of motion without deformity.   Skin: Skin color, texture, turgor normal.  No rashes or lesions. Neurologic:  Neurovascularly intact without any focal sensory/motor deficits. Cranial nerves: II-XII intact, grossly non-focal.  Psychiatric:  Alert and oriented, thought content appropriate, normal insight  Capillary Refill: Brisk,3 seconds, normal  Peripheral Pulses: +2 palpable, equal bilaterally       LABS:  Recent Labs     01/02/23  1522 01/03/23  0738   WBC 8.9 5.9   HGB 13.7 12.7*   HCT 41.9 38.7*    249                                                                    Recent Labs     01/02/23  1522 01/03/23  0738   * 137   K 3.1* 4.2   CL 93* 101   CO2 19* 25   BUN 3* 4*   CREATININE 0.9 1.0   GLUCOSE 108* 103*     Recent Labs     01/02/23  1522 01/03/23  0738   AST 16 16   ALT 8* 8*   BILITOT 0.5 1.0   ALKPHOS 80 71     Recent Labs     01/02/23  1522   TROPONINI <0.01     No results for input(s): BNP in the last 72 hours. No results for input(s): CHOL, HDL in the last 72 hours. Invalid input(s): LDLCALCU  No results for input(s): INR in the last 72 hours. Assessment & Plan:    Patient Active Problem List:     1. Intractable nausea and vomiting -  imptoved per pt report. He states it's better because \"I'm not moving. \" He is asking for regular michaela, currently clears ordered. Awaiting GI recs, will advance diet after recs from GI.  2. Hiccups: seem to be improved by watching pt but he states they are still ongoing. Pt states they have been going on for 6 months. He does not believe he has ever been given thorazine for this. Will start and monitor for response while awaiting gi inut. 3. Electrolyte abnormalities: have been replaced, monitor. The patient and / or the family were informed of the results of any tests, a time was given to answer questions, a plan was proposed and they agreed with plan.   Disposition: 1-2 days  Full Code

## 2023-01-03 NOTE — PLAN OF CARE
Pt denies nausea today, no vomiting noted. Pt tolerating regular diet. Pt continues to have hiccups. Pt compliant with meds ordered by physician. Will cont to monitor GI status.

## 2023-01-03 NOTE — CONSULTS
[address] [place] [address]  GI Consultation                                                                 Patient: Ginger Zuluaga  : 1970       Date:  1/3/2023    Subjective:       History of Present Illness  Patient is a 50 y.o.  male with medical history of persistent hiccups and hypertension admitted with Hypokalemia [E87.6]  Hyponatremia [E87.1]  Intractable hiccups [R06.6]  Subdural hygroma [G96.08]  Intractable nausea and vomiting [R11.2] who is seen in consult for intractable nausea, vomiting and gastric ulcer     Patient reports hiccups has been ongoing for almost a year. Hiccups has been gradually worsening, occurring most of the day. He is unable to sleep at night due to hiccups. He stopped working in the past 2 months due to worsening of symptoms. There is no known relieving factor. He states he has been drinking about 10 cans of pop (pepsi) daily as it the only thing he tolerates. He has not had alcohol in a while as he throws up after drinking and smokes about 4 cigars in a day. He is unable to take both solids and liquids for the past 4 days. He reports a feeling of food being stuck in his chest when he swallows, he reports same feeling to both liquids and solids. He was previously admitted in  and [facility] for the same symptom in the past few months. Had a MBS with distal esophageal stricture in 22 and an EGD in  with esophagitis, small hiatal hernia and gastric ulcer. He was started on thorazine 10 mg tid, baclofen as needed and PPI 40 mg bid. Patient states none of this medications has helped with his symptoms     In the ED, labs were significant for Na 129, K 3.1, Hb 12.7, pro-BNP elevated to 212, LFTs wnl. EKG with QTc 479. CTH with a subdural hygromas present over the right cerebral convexity. No acute ICH or mass effect. CT chest with fluid present in the esophagus which may be as a result of GERD.  He received one dose of baclofen, thorazine which he reports did not improve the hiccups. Patient reports he vomited last yesterday. He is willing to try the regular diet today and see how he does. He has had a BM today. History reviewed. No pertinent past medical history. No past surgical history on file. Past Endoscopic History     EGD 11/16/22  Esophagitis, Small hiatal hernia, and 4 mm ulcer in the gastric cardia. EGD 10/03/22 with grade 3 hiatal hernia, tortuous esophagus and candida esophagitis. Admission Meds  No current facility-administered medications on file prior to encounter. Current Outpatient Medications on File Prior to Encounter   Medication Sig Dispense Refill    baclofen (LIORESAL) 10 MG tablet       pantoprazole (PROTONIX) 40 MG tablet       valsartan (DIOVAN) 160 MG tablet       amLODIPine (NORVASC) 10 MG tablet          Patient denies ASA, NSAID use. Allergies  No Known Allergies   Social   Social History     Tobacco Use    Smoking status: Every Day     Packs/day: 1.00     Years: 5.00     Pack years: 5.00     Types: Cigarettes    Smokeless tobacco: Never   Substance Use Topics    Alcohol use: Yes     Alcohol/week: 2.0 standard drinks     Types: 2 Cans of beer per week     Comment: per joy        No family history on file. Review of Systems  Pertinent items are noted in HPI.        Physical Exam    BP (!) 156/103   Pulse 99   Temp 98.3 °F (36.8 °C) (Oral)   Resp 16   Ht 6' 2.02\" (1.88 m)   Wt 194 lb 14.2 oz (88.4 kg)   SpO2 96%   BMI 25.01 kg/m²   General appearance: alert, cooperative, no distress, appears stated age  Anicteric, No Jaundice  Head: Normocephalic, without obvious abnormality  Lungs: clear to auscultation bilaterally, Normal Effort  Heart: regular rate and rhythm, normal S1 and S2, no murmurs or rubs  Abdomen: soft, non-tender; bowel sounds normal; no masses,  no organomegaly  Extremities: atraumatic, no cyanosis or edema  Skin: warm and dry  Neuro: intact  AAOX3      Data Review:    Recent Labs     01/02/23  1522 01/03/23  0738   WBC 8.9 5.9   HGB 13.7 12.7*   HCT 41.9 38.7*   MCV 93.1 92.5    249     Recent Labs     01/02/23  1522 01/03/23  0738   * 137   K 3.1* 4.2   CL 93* 101   CO2 19* 25   BUN 3* 4*   CREATININE 0.9 1.0     Recent Labs     01/02/23  1522 01/03/23  0738   AST 16 16   ALT 8* 8*   BILIDIR <0.2  --    BILITOT 0.5 1.0   ALKPHOS 80 71     Recent Labs     01/02/23  1522   LIPASE 25.0       CT CHEST ABDOMEN PELVIS W CONTRAST 1/2/2023  1. A large left inguinal hernia is present which contains a portion of sigmoid colon. 2.  A fat-containing umbilical hernia is present. 3.  Fluid is present within the esophagus which may be due to gastroesophageal reflux disease. Assessment:     Principal Problem:    Intractable nausea and vomiting  Resolved Problems:    * No resolved hospital problems. *    Persistent hiccups   Intractable nausea and vomiting   Gastric ulcer   Hiatal hernia     Michelle Bassett is a 50 y.o. male with a medical history of persistent hiccups, candida esophagitis and hypertension who presented with hiccups, intractable nausea and vomiting. Dysphagia  Hiccups/Intractable nausea  Patient has gastric ulcer which may be contributing to the hiccups. He is on PPI bid and reports no improvement. Recommendations:     -Continue PPI 40 mg bid   -Continue chlorpromazine 25 mg tid   -consider increasing baclofen to 10 mg tid   -Consider starting gabapentin 100 mg tid if symptoms do not improve  -Continue sucralfate 1g Q6H   -Continue IVF   -Encourage PO intake if able   -Zofran as needed for nausea   - AM fasting Cortisol and TSH  - Consider achalasia, will need outpatient esophageal motility study    Patient was discussed with attending physician Afshan Pagan MD  Internal medicine resident, PGY-1  Perfect serve    Thank you for the opportunity to participate in [facility]'s care.      Patient seen discussed examined agree with note

## 2023-01-03 NOTE — H&P
Hospital Medicine History & Physical      PCP: No primary care provider on file. Date of Admission: 1/2/2023    Chief Complaint:  Hiccups      History Of Present Illness:      50 y.o. male who presents for hiccups. Patient has had hiccups ongoing for months. Patient has associated nausea, vomiting, chest pain, abdominal pain. Patient has been seen for this in the past and was admitted in November and had an esophagram that showed a possible stricture. GI then did an EGD which showed esophagitis, small hiatal hernia, gastric ulcer without bleeding. The patient was instructed to take a PPI which she reports he is taking. Patient reports despite this the symptoms have never improved. HPI is limited due to medical condition. Past Medical History:      No past medical history on file. Past Surgical History:      No past surgical history on file. Medications Prior to Admission:      Prior to Admission medications    Not on File       Allergies:  Patient has no known allergies. Social History:      TOBACCO:   has no history on file for tobacco use. ETOH:   has no history on file for alcohol use. E-cigarette/Vaping       Questions Responses    E-cigarette/Vaping Use     Start Date     Passive Exposure     Quit Date     Counseling Given     Comments               Family History:     Reviewed and negative in regards to presenting illness/complaint. No family history on file. REVIEW OF SYSTEMS COMPLETED:   Pertinent positives as noted in the HPI. All other systems reviewed and negative. PHYSICAL EXAM PERFORMED:    BP (!) 145/118   Pulse (!) 115   Temp 98.6 °F (37 °C) (Oral)   Resp 25   Wt 207 lb 9.6 oz (94.2 kg)   SpO2 98%     General appearance:  No apparent distress, appears stated age and cooperative. HEENT:  Normal cephalic, atraumatic without obvious deformity. Pupils equal, round, and reactive to light. Extra ocular muscles intact. Conjunctivae/corneas clear.   Neck: Supple, with full range of motion. No jugular venous distention. Trachea midline. Respiratory:  Normal respiratory effort. Clear to auscultation, bilaterally without Rales/Wheezes/Rhonchi. Cardiovascular:  Regular rate and rhythm with normal S1/S2 without murmurs, rubs or gallops. Abdomen: Soft, non-tender, non-distended with normal bowel sounds. Musculoskeletal:  No clubbing, cyanosis or edema bilaterally. Full range of motion without deformity. Skin: Skin color, texture, turgor normal.  No rashes or lesions. Neurologic:  Neurovascularly intact without any focal sensory/motor deficits. Cranial nerves: II-XII intact, grossly non-focal.  Psychiatric:  Alert and oriented, thought content appropriate, normal insight  Capillary Refill: Brisk,3 seconds, normal  Peripheral Pulses: +2 palpable, equal bilaterally       Labs:     Recent Labs     01/02/23  1522   WBC 8.9   HGB 13.7   HCT 41.9        Recent Labs     01/02/23  1522   *   K 3.1*   CL 93*   CO2 19*   BUN 3*   CREATININE 0.9   CALCIUM 8.8     Recent Labs     01/02/23  1522   AST 16   ALT 8*   BILIDIR <0.2   BILITOT 0.5   ALKPHOS 80     No results for input(s): INR in the last 72 hours. Recent Labs     01/02/23  1522   TROPONINI <0.01       Urinalysis:      Lab Results   Component Value Date/Time    NITRU Negative 01/02/2023 04:26 PM    WBCUA 0-2 01/02/2023 04:26 PM    BACTERIA 1+ 01/02/2023 04:26 PM    RBCUA None seen 01/02/2023 04:26 PM    BLOODU Negative 01/02/2023 04:26 PM    SPECGRAV <=1.005 01/02/2023 04:26 PM    GLUCOSEU Negative 01/02/2023 04:26 PM       Radiology:     CXR: I have reviewed the CXR   EKG:  I have reviewed the EKG     CT CHEST ABDOMEN PELVIS W CONTRAST Additional Contrast? None   Final Result   1. A large left inguinal hernia is present which contains a portion of sigmoid colon. 2.  A fat-containing umbilical hernia is present. 3.  Fluid is present within the esophagus which may be due to gastroesophageal reflux disease. CT Head W/O Contrast   Final Result   1. No acute intracranial hemorrhage or mass effect. 2.  A subdural hygromas present over the right cerebral convexity. XR CHEST PORTABLE   Final Result   1. No acute cardiopulmonary findings. Consults:    IP CONSULT TO HOSPITALIST    ASSESSMENT:    -Intractable nausea vomiting   -Lactic acidosis  -Sinus tachycardia  -Hiccups   -h/o esophagitis, gastric ulcer   -Hiatal hernia  -Large left inguinal hernia. Umbilical hernia. -Hypokalemia  -Hyponatremia     PLAN:    Admit patient for observation to [facility]  IV fluid   Clear liquid diet   IV Pantoprazole 40 mg bid  Start sucralfate   Start baclofen for hiccups  Obtain GI consult  Correct electrolytes  C/w home meds      DVT Prophylaxis: Lovenox   Diet: No diet orders on file  Code Status: No Order    PT/OT Eval Status: na    Dispo - Pending clinical improvement        Chandan Eugene MD    Thank you No primary care provider on file. for the opportunity to be involved in this patient's care. If you have any questions or concerns please feel free to contact me at [phone].

## 2023-01-03 NOTE — PLAN OF CARE
Problem: Gastrointestinal - Adult  Goal: Minimal or absence of nausea and vomiting  Outcome: Progressing  Goal: Maintains adequate nutritional intake  Outcome: Progressing

## 2023-01-04 LAB
CORTISOL TOTAL: 4.6 UG/DL
TSH REFLEX: 3.6 UIU/ML (ref 0.27–4.2)

## 2023-01-04 PROCEDURE — 36415 COLL VENOUS BLD VENIPUNCTURE: CPT

## 2023-01-04 PROCEDURE — 6370000000 HC RX 637 (ALT 250 FOR IP): Performed by: INTERNAL MEDICINE

## 2023-01-04 PROCEDURE — 6360000002 HC RX W HCPCS: Performed by: INTERNAL MEDICINE

## 2023-01-04 PROCEDURE — 96376 TX/PRO/DX INJ SAME DRUG ADON: CPT

## 2023-01-04 PROCEDURE — C9113 INJ PANTOPRAZOLE SODIUM, VIA: HCPCS | Performed by: INTERNAL MEDICINE

## 2023-01-04 PROCEDURE — 84443 ASSAY THYROID STIM HORMONE: CPT

## 2023-01-04 PROCEDURE — 2580000003 HC RX 258: Performed by: INTERNAL MEDICINE

## 2023-01-04 PROCEDURE — G0378 HOSPITAL OBSERVATION PER HR: HCPCS

## 2023-01-04 PROCEDURE — 82533 TOTAL CORTISOL: CPT

## 2023-01-04 PROCEDURE — 96372 THER/PROPH/DIAG INJ SC/IM: CPT

## 2023-01-04 RX ADMIN — SUCRALFATE 1 G: 1 TABLET ORAL at 06:28

## 2023-01-04 RX ADMIN — CHLORPROMAZINE HYDROCHLORIDE 25 MG: 25 TABLET, FILM COATED ORAL at 21:29

## 2023-01-04 RX ADMIN — CHLORPROMAZINE HYDROCHLORIDE 25 MG: 25 TABLET, FILM COATED ORAL at 08:59

## 2023-01-04 RX ADMIN — SUCRALFATE 1 G: 1 TABLET ORAL at 17:42

## 2023-01-04 RX ADMIN — AMLODIPINE BESYLATE 10 MG: 10 TABLET ORAL at 08:59

## 2023-01-04 RX ADMIN — SODIUM CHLORIDE, POTASSIUM CHLORIDE, SODIUM LACTATE AND CALCIUM CHLORIDE: 600; 310; 30; 20 INJECTION, SOLUTION INTRAVENOUS at 00:37

## 2023-01-04 RX ADMIN — SUCRALFATE 1 G: 1 TABLET ORAL at 23:49

## 2023-01-04 RX ADMIN — SUCRALFATE 1 G: 1 TABLET ORAL at 11:43

## 2023-01-04 RX ADMIN — SODIUM CHLORIDE, POTASSIUM CHLORIDE, SODIUM LACTATE AND CALCIUM CHLORIDE: 600; 310; 30; 20 INJECTION, SOLUTION INTRAVENOUS at 19:59

## 2023-01-04 RX ADMIN — SUCRALFATE 1 G: 1 TABLET ORAL at 00:32

## 2023-01-04 RX ADMIN — ENOXAPARIN SODIUM 40 MG: 100 INJECTION SUBCUTANEOUS at 08:59

## 2023-01-04 RX ADMIN — ONDANSETRON 4 MG: 4 TABLET, ORALLY DISINTEGRATING ORAL at 00:39

## 2023-01-04 RX ADMIN — BACLOFEN 5 MG: 10 TABLET ORAL at 13:33

## 2023-01-04 RX ADMIN — SODIUM CHLORIDE, PRESERVATIVE FREE 10 ML: 5 INJECTION INTRAVENOUS at 21:29

## 2023-01-04 RX ADMIN — SODIUM CHLORIDE, POTASSIUM CHLORIDE, SODIUM LACTATE AND CALCIUM CHLORIDE: 600; 310; 30; 20 INJECTION, SOLUTION INTRAVENOUS at 13:32

## 2023-01-04 RX ADMIN — PANTOPRAZOLE SODIUM 40 MG: 40 INJECTION, POWDER, LYOPHILIZED, FOR SOLUTION INTRAVENOUS at 21:29

## 2023-01-04 RX ADMIN — SODIUM CHLORIDE, PRESERVATIVE FREE 10 ML: 5 INJECTION INTRAVENOUS at 09:04

## 2023-01-04 RX ADMIN — BACLOFEN 5 MG: 10 TABLET ORAL at 08:59

## 2023-01-04 RX ADMIN — PANTOPRAZOLE SODIUM 40 MG: 40 INJECTION, POWDER, LYOPHILIZED, FOR SOLUTION INTRAVENOUS at 08:59

## 2023-01-04 RX ADMIN — CHLORPROMAZINE HYDROCHLORIDE 25 MG: 25 TABLET, FILM COATED ORAL at 13:33

## 2023-01-04 RX ADMIN — BACLOFEN 5 MG: 10 TABLET ORAL at 21:29

## 2023-01-04 ASSESSMENT — PAIN SCALES - GENERAL
PAINLEVEL_OUTOF10: 0

## 2023-01-04 NOTE — PROGRESS NOTES
[address] [place] [address]  GI Progress Note          Cira Go is a 50 y.o. male patient. 1. Intractable hiccups    2. Intractable nausea and vomiting    3. Hyponatremia    4. Hypokalemia    5. Subdural hygroma        Admit Date: 1/2/2023    Subjective:       Patient states he was able to get some sleep overnight. He is however unable to tell if hiccups are less frequent. He tolerated a regular diet yesterday. He had meatloaf yesterday and denies any dysphagia. Denies any vomiting or abdominal pain.        ROS:  Cardiovascular ROS: no chest pain or dyspnea on exertion  Gastrointestinal ROS: no abdominal pain, change in bowel habits, or black or bloody stools  Respiratory ROS: no cough, shortness of breath, or wheezing    Scheduled Meds:   amLODIPine  10 mg Oral Daily    chlorproMAZINE  25 mg Oral TID    sodium chloride flush  5-40 mL IntraVENous 2 times per day    enoxaparin  40 mg SubCUTAneous Daily    pantoprazole  40 mg IntraVENous BID    baclofen  5 mg Oral TID    sucralfate  1 g Oral 4 times per day       Continuous Infusions:   sodium chloride      lactated ringers 150 mL/hr at 01/04/23 0037       PRN Meds:  sodium chloride flush, sodium chloride, ondansetron **OR** ondansetron, polyethylene glycol, acetaminophen **OR** acetaminophen, potassium chloride **OR** potassium alternative oral replacement **OR** potassium chloride, magnesium sulfate      Objective:       Patient Vitals for the past 24 hrs:   BP Temp Temp src Pulse Resp SpO2   01/04/23 0320 (!) 157/95 98 °F (36.7 °C) Oral 96 18 95 %   01/04/23 0015 (!) 157/73 98.5 °F (36.9 °C) Oral (!) 102 16 100 %   01/03/23 2009 -- -- -- (!) 113 -- --   01/03/23 1928 115/70 98.2 °F (36.8 °C) Oral (!) 113 17 96 %   01/03/23 1442 138/83 98.7 °F (37.1 °C) Oral (!) 122 16 97 %       Exam:  VITALS:  BP (!) 157/95   Pulse 96   Temp 98 °F (36.7 °C) (Oral)   Resp 18   Ht 6' 2.02\" (1.88 m)   Wt 194 lb 14.2 oz (88.4 kg)   SpO2 95%   BMI 25.01 kg/m² TEMPERATURE:  Current - Temp: 98 °F (36.7 °C); Max - Temp  Av.4 °F (36.9 °C)  Min: 98 °F (36.7 °C)  Max: 98.7 °F (37.1 °C)    NAD  General appearance: alert, appears stated age, cooperative and no distress  Head: Normocephalic, without obvious abnormality, atraumatic  Neck: supple, symmetrical, trachea midline and thyroid not enlarged, symmetric, no tenderness/mass/nodules  CVS:  RRR, Nl s1s2  Lungs CTA Bilaterally, normal effort  Abdomen: soft, non-tender; bowel sounds normal; no masses,  no organomegaly  AAOx3, No asterixis or encephalopathy  Extremities: No edema. Recent Labs     23  1522 23  0738   WBC 8.9 5.9   HGB 13.7 12.7*   HCT 41.9 38.7*   MCV 93.1 92.5    249     Recent Labs     23  1522 23  0738   * 137   K 3.1* 4.2   CL 93* 101   CO2 19* 25   BUN 3* 4*   CREATININE 0.9 1.0     Recent Labs     23  1522 23  0738   AST 16 16   ALT 8* 8*   BILIDIR <0.2  --    BILITOT 0.5 1.0   ALKPHOS 80 71     Recent Labs     23  1522   LIPASE 25.0       Radiology review:   Assessment:       Principal Problem:    Intractable nausea and vomiting  Resolved Problems:    * No resolved hospital problems. Bee Head is a 50 y.o. male with a medical history of persistent hiccups, PUD, candida esophagitis and hypertension who presented with hiccups, intractable nausea and vomiting. Dysphagia - improved   Hiccups/Intractable nausea      Recommendations:       -Continue IV PPI 40 mg bid   -Monitor for improvement in symptoms   -continue thorazine/baclofen   -continue sucralfate 1g Q6H   -f/u TSH     Early Mike Pagan MD, PGY-1   2023  8:30 AM    Patient seen examined discussed agree with note. Will leave up to IM if Cotrosyn stim test needed.  Will set up OP esophageal motility study

## 2023-01-04 NOTE — PROGRESS NOTES
4 Eyes Admission Assessment     I agree as the admission nurse that 2 RN's have performed a thorough Head to Toe Skin Assessment on the patient. ALL assessment sites listed below have been assessed on admission. Areas assessed by both nurses: Mara Horde  [x]   Head, Face, and Ears   [x]   Shoulders, Back, and Chest  [x]   Arms, Elbows, and Hands   [x]   Coccyx, Sacrum, and Ischium  [x]   Legs, Feet, and Heels        Does the Patient have Skin Breakdown?   No         Socrates Prevention initiated:  No   Wound Care Orders initiated:  No      [facility] nurse consulted for Pressure Injury (Stage 3,4, Unstageable, DTI, NWPT, and Complex wounds) or Socrates score 18 or lower:  NA      Nurse 1 eSignature: Electronically signed by Eladio Alonzo RN on 1/4/23 at 12:52 AM EST    **SHARE this note so that the co-signing nurse is able to place an eSignature**    Nurse 2 eSignature: Electronically signed by Rochelle Donahue RN on 1/4/23 at 4:07 AM EST

## 2023-01-04 NOTE — PROGRESS NOTES
Progress Note  Admit Date: 1/2/2023       Overnight Events: none noted     CC: F/U for hiccups, n/v, esophagitis  Interval History: feels a little better today, states he is tolerating po. Did not sleep well last pm and still having hiccups a lot. amLODIPine  10 mg Oral Daily    chlorproMAZINE  25 mg Oral TID    sodium chloride flush  5-40 mL IntraVENous 2 times per day    enoxaparin  40 mg SubCUTAneous Daily    pantoprazole  40 mg IntraVENous BID    baclofen  5 mg Oral TID    sucralfate  1 g Oral 4 times per day      PRN Medications: sodium chloride flush, sodium chloride, ondansetron **OR** ondansetron, polyethylene glycol, acetaminophen **OR** acetaminophen, potassium chloride **OR** potassium alternative oral replacement **OR** potassium chloride, magnesium sulfate  Diet: ADULT DIET; Regular  Continuous Infusions:   sodium chloride      lactated ringers 150 mL/hr at 01/04/23 1332     PHYSICAL EXAM:  BP (!) 143/109   Pulse (!) 104   Temp 98.1 °F (36.7 °C) (Oral)   Resp 18   Ht 6' 2.02\" (1.88 m)   Wt 194 lb 14.2 oz (88.4 kg)   SpO2 99%   BMI 25.01 kg/m²   No results for input(s): POCGLU in the last 72 hours. Intake/Output Summary (Last 24 hours) at 1/4/2023 1353  Last data filed at 1/4/2023 1142  Gross per 24 hour   Intake 720 ml   Output 2700 ml   Net -1980 ml         General appearance:  No apparent distress, appears stated age and cooperative. HEENT:  Normal cephalic, atraumatic without obvious deformity. Pupils equal, round, and reactive to light. Extra ocular muscles intact. Conjunctivae/corneas clear. Neck: Supple, with full range of motion. No jugular venous distention. Trachea midline. Respiratory:  Normal respiratory effort. Clear to auscultation, bilaterally without Rales/Wheezes/Rhonchi. Cardiovascular:  Regular rate and rhythm with normal S1/S2 without murmurs, rubs or gallops. Abdomen: Soft, non-tender, non-distended with normal bowel sounds.   Musculoskeletal:  No clubbing, cyanosis or edema bilaterally. Full range of motion without deformity. Skin: Skin color, texture, turgor normal.  No rashes or lesions. Neurologic:  Neurovascularly intact without any focal sensory/motor deficits. Cranial nerves: II-XII intact, grossly non-focal.  Psychiatric:  Alert and oriented, thought content appropriate, normal insight  Capillary Refill: Brisk,3 seconds, normal  Peripheral Pulses: +2 palpable, equal bilaterally       LABS:  Recent Labs     01/02/23  1522 01/03/23  0738   WBC 8.9 5.9   HGB 13.7 12.7*   HCT 41.9 38.7*    249                                                                      Recent Labs     01/02/23  1522 01/03/23  0738   * 137   K 3.1* 4.2   CL 93* 101   CO2 19* 25   BUN 3* 4*   CREATININE 0.9 1.0   GLUCOSE 108* 103*       Recent Labs     01/02/23  1522 01/03/23  0738   AST 16 16   ALT 8* 8*   BILITOT 0.5 1.0   ALKPHOS 80 71       Recent Labs     01/02/23  1522   TROPONINI <0.01       No results for input(s): BNP in the last 72 hours. No results for input(s): CHOL, HDL in the last 72 hours. Invalid input(s): LDLCALCU  No results for input(s): INR in the last 72 hours. Assessment & Plan:    Patient Active Problem List:     1. Intractable nausea and vomiting -  improved per pt report. He states it's better because \"I'm not moving. \" Tolerating regular diet per his report. Per GI plan to cont IV PPI today. No plan for repeat EGD at this time. 2. Hiccups: will try baclofen, order placed per GI, monitor. 3. Electrolyte abnormalities: have been replaced, monitor. Stable today. The patient and / or the family were informed of the results of any tests, a time was given to answer questions, a plan was proposed and they agreed with plan. Disposition: 1-2 days if continues to tolerate po and hiccups improved, possible dc tomorrow.    Full Code

## 2023-01-04 NOTE — PROGRESS NOTES
Pt transferred to floor eating and drinking soda at 0040. Pt has a fasting cortisol and TSH lab draw at 0600. MD notified and ok'd to draw lab at 0600.

## 2023-01-04 NOTE — PLAN OF CARE
Problem: Gastrointestinal - Adult  Goal: Minimal or absence of nausea and vomiting  Outcome: Progressing    Problem: Discharge Planning  Goal: Discharge to home or other facility with appropriate resources  Outcome: Progressing     Problem: Safety - Adult  Goal: Free from fall injury  Outcome: Progressing

## 2023-01-04 NOTE — PROGRESS NOTES
[facility] Patient and oriented to patient room including call light and bed controls. Patient is a medium fall risk. Safety measures instituted per policy. Patient oriented to unit policies and procedures including: pain management practices, unit safety precautions, family rapid response, q4h vital signs and assessments, daily 4am lab draws. Also discussed use of call light and how to get in touch with nursing staff. Stressed the importance of calling out immediately for any changes in condition. Patient verbalizes understanding of all instructions and will call for assistance as needed.

## 2023-01-05 VITALS
WEIGHT: 194.89 LBS | BODY MASS INDEX: 25.01 KG/M2 | HEIGHT: 74 IN | OXYGEN SATURATION: 97 % | RESPIRATION RATE: 16 BRPM | DIASTOLIC BLOOD PRESSURE: 98 MMHG | HEART RATE: 95 BPM | SYSTOLIC BLOOD PRESSURE: 157 MMHG | TEMPERATURE: 98 F

## 2023-01-05 PROBLEM — R11.2 N&V (NAUSEA AND VOMITING): Status: ACTIVE | Noted: 2023-01-05

## 2023-01-05 PROCEDURE — 6370000000 HC RX 637 (ALT 250 FOR IP): Performed by: INTERNAL MEDICINE

## 2023-01-05 PROCEDURE — 96376 TX/PRO/DX INJ SAME DRUG ADON: CPT

## 2023-01-05 PROCEDURE — C9113 INJ PANTOPRAZOLE SODIUM, VIA: HCPCS | Performed by: INTERNAL MEDICINE

## 2023-01-05 PROCEDURE — G0378 HOSPITAL OBSERVATION PER HR: HCPCS

## 2023-01-05 PROCEDURE — 1200000000 HC SEMI PRIVATE

## 2023-01-05 PROCEDURE — 2580000003 HC RX 258: Performed by: INTERNAL MEDICINE

## 2023-01-05 PROCEDURE — 6360000002 HC RX W HCPCS: Performed by: INTERNAL MEDICINE

## 2023-01-05 PROCEDURE — 96372 THER/PROPH/DIAG INJ SC/IM: CPT

## 2023-01-05 RX ORDER — AMLODIPINE BESYLATE 10 MG/1
10 TABLET ORAL DAILY
Qty: 30 TABLET | Refills: 3 | Status: SHIPPED | OUTPATIENT
Start: 2023-01-06

## 2023-01-05 RX ORDER — ONDANSETRON 4 MG/1
4 TABLET, ORALLY DISINTEGRATING ORAL EVERY 8 HOURS PRN
Qty: 30 TABLET | Refills: 0 | Status: SHIPPED | OUTPATIENT
Start: 2023-01-05

## 2023-01-05 RX ORDER — ONDANSETRON 4 MG/1
4 TABLET, ORALLY DISINTEGRATING ORAL EVERY 8 HOURS PRN
Qty: 30 TABLET | Refills: 0 | Status: SHIPPED | OUTPATIENT
Start: 2023-01-05 | End: 2023-01-05 | Stop reason: SDUPTHER

## 2023-01-05 RX ORDER — PANTOPRAZOLE SODIUM 40 MG/1
40 TABLET, DELAYED RELEASE ORAL 2 TIMES DAILY
Qty: 60 TABLET | Refills: 1 | Status: SHIPPED | OUTPATIENT
Start: 2023-01-05 | End: 2023-01-05 | Stop reason: SDUPTHER

## 2023-01-05 RX ORDER — BACLOFEN 5 MG/1
5 TABLET ORAL 3 TIMES DAILY
Qty: 90 TABLET | Refills: 0 | Status: SHIPPED | OUTPATIENT
Start: 2023-01-05

## 2023-01-05 RX ORDER — AMLODIPINE BESYLATE 10 MG/1
10 TABLET ORAL DAILY
Qty: 30 TABLET | Refills: 3 | Status: SHIPPED | OUTPATIENT
Start: 2023-01-06 | End: 2023-01-05 | Stop reason: SDUPTHER

## 2023-01-05 RX ORDER — PANTOPRAZOLE SODIUM 40 MG/1
40 TABLET, DELAYED RELEASE ORAL 2 TIMES DAILY
Qty: 60 TABLET | Refills: 1 | Status: SHIPPED | OUTPATIENT
Start: 2023-01-05

## 2023-01-05 RX ORDER — BACLOFEN 5 MG/1
5 TABLET ORAL 3 TIMES DAILY
Qty: 90 TABLET | Refills: 0 | Status: SHIPPED | OUTPATIENT
Start: 2023-01-05 | End: 2023-01-05 | Stop reason: SDUPTHER

## 2023-01-05 RX ADMIN — CHLORPROMAZINE HYDROCHLORIDE 25 MG: 25 TABLET, FILM COATED ORAL at 08:54

## 2023-01-05 RX ADMIN — PANTOPRAZOLE SODIUM 40 MG: 40 INJECTION, POWDER, LYOPHILIZED, FOR SOLUTION INTRAVENOUS at 08:46

## 2023-01-05 RX ADMIN — CHLORPROMAZINE HYDROCHLORIDE 25 MG: 25 TABLET, FILM COATED ORAL at 13:12

## 2023-01-05 RX ADMIN — SUCRALFATE 1 G: 1 TABLET ORAL at 13:12

## 2023-01-05 RX ADMIN — SUCRALFATE 1 G: 1 TABLET ORAL at 06:17

## 2023-01-05 RX ADMIN — BACLOFEN 5 MG: 10 TABLET ORAL at 13:11

## 2023-01-05 RX ADMIN — BACLOFEN 5 MG: 10 TABLET ORAL at 08:46

## 2023-01-05 RX ADMIN — AMLODIPINE BESYLATE 10 MG: 10 TABLET ORAL at 08:46

## 2023-01-05 RX ADMIN — SODIUM CHLORIDE, PRESERVATIVE FREE 5 ML: 5 INJECTION INTRAVENOUS at 08:46

## 2023-01-05 RX ADMIN — SODIUM CHLORIDE, POTASSIUM CHLORIDE, SODIUM LACTATE AND CALCIUM CHLORIDE: 600; 310; 30; 20 INJECTION, SOLUTION INTRAVENOUS at 08:57

## 2023-01-05 RX ADMIN — ENOXAPARIN SODIUM 40 MG: 100 INJECTION SUBCUTANEOUS at 08:46

## 2023-01-05 ASSESSMENT — PAIN SCALES - GENERAL: PAINLEVEL_OUTOF10: 0

## 2023-01-05 NOTE — DISCHARGE SUMMARY
Hospital Discharge Summary    Patient's PCP: No primary care provider on file. Admit Date: 1/2/2023   Discharge Date: 1/5/2023    Admitting Physician: Dilan Montes De Oca MD  Discharge Physician: Dilan Montes De Oca MD   Consults: GI      Discharge Diagnoses:       Patient Active Problem List:     1. Intractable nausea and vomiting -  improved, tolerating po. Per GI cont bid PPI, plans for outpt esophageal motility study. 2. Hiccups: seems to be responding to baclofen, cont and f/u with GI.    3. Electrolyte abnormalities: have been replaced, stable. Likely related to n/v which is resolved. Patient Active Problem List   Diagnosis    Intractable nausea and vomiting    N&V (nausea and vomiting)       Physical Exam: BP (!) 157/98   Pulse 95   Temp 98 °F (36.7 °C) (Oral)   Resp 16   Ht 6' 2.02\" (1.88 m)   Wt 194 lb 14.2 oz (88.4 kg)   SpO2 97%   BMI 25.01 kg/m²   No results for input(s): POCGLU in the last 72 hours. General appearance:  No apparent distress, appears stated age and cooperative. HEENT:  Normal cephalic, atraumatic without obvious deformity. Pupils equal, round, and reactive to light. Extra ocular muscles intact. Conjunctivae/corneas clear. Neck: Supple, with full range of motion. No jugular venous distention. Trachea midline. Respiratory:  Normal respiratory effort. Clear to auscultation, bilaterally without Rales/Wheezes/Rhonchi. Cardiovascular:  Regular rate and rhythm with normal S1/S2 without murmurs, rubs or gallops. Abdomen: Soft, non-tender, non-distended with normal bowel sounds. Musculoskeletal:  No clubbing, cyanosis or edema bilaterally. Full range of motion without deformity. Skin: Skin color, texture, turgor normal.  No rashes or lesions. Neurologic:  Neurovascularly intact without any focal sensory/motor deficits.  Cranial nerves: II-XII intact, grossly non-focal.  Psychiatric:  Alert and oriented, thought content appropriate, normal insight  Capillary Refill: Brisk,3 seconds, normal  Peripheral Pulses: +2 palpable, equal bilaterally       LABS:  Recent Labs     01/03/23  0738   WBC 5.9   HGB 12.7*         Recent Labs     01/03/23  0738      K 4.2      CO2 25   BUN 4*   CREATININE 1.0   GLUCOSE 103*     Discharge Medications:     Medication List        START taking these medications      ondansetron 4 MG disintegrating tablet  Commonly known as: ZOFRAN-ODT  Take 1 tablet by mouth every 8 hours as needed for Nausea or Vomiting            CHANGE how you take these medications      amLODIPine 10 MG tablet  Commonly known as: NORVASC  Take 1 tablet by mouth daily  Start taking on: January 6, 2023  What changed:   how much to take  how to take this  when to take this     Baclofen 5 MG tablet  Commonly known as: LIORESAL  Take 1 tablet by mouth 3 times daily  What changed:   medication strength  how much to take  how to take this  when to take this     pantoprazole 40 MG tablet  Commonly known as: PROTONIX  Take 1 tablet by mouth in the morning and at bedtime  What changed:   how much to take  how to take this  when to take this            STOP taking these medications      valsartan 160 MG tablet  Commonly known as: DIOVAN               Where to Get Your Medications        You can get these medications from any pharmacy    Bring a paper prescription for each of these medications  amLODIPine 10 MG tablet  Baclofen 5 MG tablet  ondansetron 4 MG disintegrating tablet  pantoprazole 40 MG tablet        Activity: activity as tolerated  Diet: regular diet  Wound Care: none needed    Disposition: home  Discharged Condition: Stable  Follow Up: Primary Care Physician in one week    Total time spent on discharge, finalizing medications, referrals and arranging outpatient follow up was more than 45 minutes    Thank you Dr. Flores primary care provider on file. for the opportunity to be involved in this patients care.  If you have any questions or concerns please feel free to contact me at [phone].

## 2023-01-05 NOTE — PLAN OF CARE
Problem: Gastrointestinal - Adult  Goal: Minimal or absence of nausea and vomiting  Outcome: Progressing     Problem: Gastrointestinal - Adult  Goal: Maintains adequate nutritional intake  Outcome: Progressing     Problem: Safety - Adult  Goal: Free from fall injury  Outcome: Progressing

## 2023-01-05 NOTE — PROGRESS NOTES
[address] [place] [address]  GI Progress Note          Theresase Broussard is a 50 y.o. male patient. 1. Intractable hiccups    2. Intractable nausea and vomiting    3. Hyponatremia    4. Hypokalemia    5. Subdural hygroma        Admit Date: 2023    Subjective: Tolerating PO hiccups decreased no emesis    Scheduled Meds:   amLODIPine  10 mg Oral Daily    chlorproMAZINE  25 mg Oral TID    sodium chloride flush  5-40 mL IntraVENous 2 times per day    enoxaparin  40 mg SubCUTAneous Daily    pantoprazole  40 mg IntraVENous BID    baclofen  5 mg Oral TID    sucralfate  1 g Oral 4 times per day         Objective:       Patient Vitals for the past 24 hrs:   BP Temp Temp src Pulse Resp SpO2   23 0437 (!) 157/108 98.1 °F (36.7 °C) Oral 80 16 100 %   23 2346 (!) 128/92 98.1 °F (36.7 °C) Oral 88 16 98 %   23 1958 (!) 138/99 98.4 °F (36.9 °C) Oral (!) 102 18 96 %   23 1533 (!) 154/100 97.6 °F (36.4 °C) Oral 88 18 100 %   23 1142 (!) 143/109 98.1 °F (36.7 °C) Oral (!) 104 18 99 %   23 0855 (!) 161/113 98.1 °F (36.7 °C) Oral 98 18 97 %       Exam:  VITALS:  BP (!) 157/108   Pulse 80   Temp 98.1 °F (36.7 °C) (Oral)   Resp 16   Ht 6' 2.02\" (1.88 m)   Wt 194 lb 14.2 oz (88.4 kg)   SpO2 100%   BMI 25.01 kg/m²   TEMPERATURE:  Current - Temp: 98.1 °F (36.7 °C);  Max - Temp  Av.1 °F (36.7 °C)  Min: 97.6 °F (36.4 °C)  Max: 98.4 °F (36.9 °C)    NAD  General appearance: alert, appears stated age, cooperative and no distress  Abdomen: soft, non-tender; bowel sounds normal; no masses,  no organomegaly      Recent Labs     23  1522 23  0738   WBC 8.9 5.9   HGB 13.7 12.7*   HCT 41.9 38.7*   MCV 93.1 92.5    249     Recent Labs     23  1522 23  0738   * 137   K 3.1* 4.2   CL 93* 101   CO2 19* 25   BUN 3* 4*   CREATININE 0.9 1.0     Recent Labs     23  1522 23  0738   AST 16 16   ALT 8* 8*   BILIDIR <0.2  --    BILITOT 0.5 1.0   ALKPHOS 80 71 Recent Labs     01/02/23  1522   LIPASE 25.0       Assessment:       Principal Problem:    Intractable nausea and vomiting  Resolved Problems:    * No resolved hospital problems.  *      Sx improved  Need to exclude achalasia    Recommendations:       Cont BID PPI and baclofen  OP esophageal motility study to be arranged  Will sign off recall GI if needed    Kalli Singleton MD  1/5/2023  7:45 AM

## 2023-01-05 NOTE — PROGRESS NOTES
AVS reviewed with patient. All scripts given to patient. Patient stated that his mom would fill the prescriptions for him. Next doses reviewed with patient. Patient expressed understanding. PIV removed without complication. Darren donahue called for patient. Patient taken down to meet darren by PCA.

## 2023-01-05 NOTE — CARE COORDINATION
1:20 PM  Patient to discharge home with no needs today. [facility]

## 2023-01-09 NOTE — PROGRESS NOTES
Physician Progress Note      PATIENT:               ERIC BECKER  CSN #:                  [id number]  :                       1970  ADMIT DATE:       2023 2:59 PM  DISCH DATE:        2023 2:00 PM  RESPONDING  PROVIDER #:        Anca Angelo MD          QUERY TEXT:    Pt admitted with hiccups. Pt noted to have waophagitis, possible stricture,   and gastric ulcer, need to rule out achalasia. If possible, please document in   progress notes and discharge summary if you are evaluating and /or treating   any of the following:    The medical record reflects the following:  Risk Factors: gastric ulcer, esophagitis, stricture  Clinical Indicators: Per GI: Patient has gastric ulcer which may be   contributing to the hiccups. He is on PPI bid and reports no improvement.    Need to exclude achalasia  Treatment: Cont BID PPI and baclofen,  OP esophageal motility study to be   arranged  Options provided:  -- Hiccups, possibly related to gastric ulcer  -- Hiccups, possibly related to espohagitis  -- Hiccups, possibly due to esophageal stricture and achalasia  -- Other - I will add my own diagnosis  -- Disagree - Not applicable / Not valid  -- Disagree - Clinically unable to determine / Unknown  -- Refer to Clinical Documentation Reviewer    PROVIDER RESPONSE TEXT:    This patient has hiccups, possibly due to esophageal stricture and achalasia.    Query created by: Susan Mir on 2023 6:25 AM      Electronically signed by:  Anca Angelo MD 2023 3:59 PM

## 2023-01-16 ENCOUNTER — HOSPITAL ENCOUNTER (OUTPATIENT)
Dept: ENDOSCOPY | Age: 53
Discharge: HOME | End: 2023-01-16

## 2023-06-05 ENCOUNTER — HOSPITAL ENCOUNTER (INPATIENT)
Age: 53
LOS: 2 days | Discharge: HOME | End: 2023-06-09
Attending: EMERGENCY MEDICINE | Admitting: INTERNAL MEDICINE
Payer: COMMERCIAL

## 2023-06-05 DIAGNOSIS — R11.0 NAUSEA: Primary | ICD-10-CM

## 2023-06-05 LAB
ALBUMIN SERPL-MCNC: 4.1 G/DL (ref 3.4–5)
ALBUMIN/GLOB SERPL: 1.1 {RATIO} (ref 1.1–2.2)
ALP SERPL-CCNC: 93 U/L (ref 40–129)
ALT SERPL-CCNC: 10 U/L (ref 10–40)
ANION GAP SERPL CALCULATED.3IONS-SCNC: 13 MMOL/L (ref 3–16)
AST SERPL-CCNC: 19 U/L (ref 15–37)
BASOPHILS # BLD: 0 K/UL (ref 0–0.2)
BASOPHILS NFR BLD: 0.2 %
BILIRUB SERPL-MCNC: 0.8 MG/DL (ref 0–1)
BUN SERPL-MCNC: <2 MG/DL (ref 7–20)
CALCIUM SERPL-MCNC: 9.2 MG/DL (ref 8.3–10.6)
CHLORIDE SERPL-SCNC: 97 MMOL/L (ref 99–110)
CO2 SERPL-SCNC: 21 MMOL/L (ref 21–32)
CREAT SERPL-MCNC: 0.7 MG/DL (ref 0.9–1.3)
DEPRECATED RDW RBC AUTO: 17.7 % (ref 12.4–15.4)
EOSINOPHIL # BLD: 0.2 K/UL (ref 0–0.6)
EOSINOPHIL NFR BLD: 2.8 %
GFR SERPLBLD CREATININE-BSD FMLA CKD-EPI: >60 ML/MIN/{1.73_M2}
GLUCOSE SERPL-MCNC: 107 MG/DL (ref 70–99)
HCT VFR BLD AUTO: 36.7 % (ref 40.5–52.5)
HGB BLD-MCNC: 12 G/DL (ref 13.5–17.5)
LYMPHOCYTES # BLD: 2.7 K/UL (ref 1–5.1)
LYMPHOCYTES NFR BLD: 32.2 %
MCH RBC QN AUTO: 28.8 PG (ref 26–34)
MCHC RBC AUTO-ENTMCNC: 32.7 G/DL (ref 31–36)
MCV RBC AUTO: 88.1 FL (ref 80–100)
MONOCYTES # BLD: 0.9 K/UL (ref 0–1.3)
MONOCYTES NFR BLD: 11.3 %
NEUTROPHILS # BLD: 4.5 K/UL (ref 1.7–7.7)
NEUTROPHILS NFR BLD: 53.5 %
PLATELET # BLD AUTO: 380 K/UL (ref 135–450)
PMV BLD AUTO: 7.7 FL (ref 5–10.5)
POTASSIUM SERPL-SCNC: 3.7 MMOL/L (ref 3.5–5.1)
PROT SERPL-MCNC: 8 G/DL (ref 6.4–8.2)
RBC # BLD AUTO: 4.17 M/UL (ref 4.2–5.9)
SODIUM SERPL-SCNC: 131 MMOL/L (ref 136–145)
WBC # BLD AUTO: 8.4 K/UL (ref 4–11)

## 2023-06-05 PROCEDURE — 96374 THER/PROPH/DIAG INJ IV PUSH: CPT

## 2023-06-05 PROCEDURE — 96375 TX/PRO/DX INJ NEW DRUG ADDON: CPT

## 2023-06-05 PROCEDURE — 2580000003 HC RX 258: Performed by: EMERGENCY MEDICINE

## 2023-06-05 PROCEDURE — G0378 HOSPITAL OBSERVATION PER HR: HCPCS

## 2023-06-05 PROCEDURE — 80053 COMPREHEN METABOLIC PANEL: CPT

## 2023-06-05 PROCEDURE — 2580000003 HC RX 258: Performed by: NURSE PRACTITIONER

## 2023-06-05 PROCEDURE — 96372 THER/PROPH/DIAG INJ SC/IM: CPT

## 2023-06-05 PROCEDURE — 99285 EMERGENCY DEPT VISIT HI MDM: CPT

## 2023-06-05 PROCEDURE — 6360000002 HC RX W HCPCS: Performed by: EMERGENCY MEDICINE

## 2023-06-05 PROCEDURE — 6370000000 HC RX 637 (ALT 250 FOR IP): Performed by: NURSE PRACTITIONER

## 2023-06-05 PROCEDURE — 36415 COLL VENOUS BLD VENIPUNCTURE: CPT

## 2023-06-05 PROCEDURE — 85025 COMPLETE CBC W/AUTO DIFF WBC: CPT

## 2023-06-05 RX ORDER — BACLOFEN 10 MG/1
10 TABLET ORAL 3 TIMES DAILY PRN
Status: DISCONTINUED | OUTPATIENT
Start: 2023-06-05 | End: 2023-06-07

## 2023-06-05 RX ORDER — CHLORPROMAZINE HYDROCHLORIDE 25 MG/1
25 TABLET, FILM COATED ORAL 3 TIMES DAILY PRN
Status: ON HOLD | COMMUNITY
End: 2023-06-09 | Stop reason: HOSPADM

## 2023-06-05 RX ORDER — ONDANSETRON 2 MG/ML
4 INJECTION INTRAMUSCULAR; INTRAVENOUS ONCE
Status: COMPLETED | OUTPATIENT
Start: 2023-06-05 | End: 2023-06-05

## 2023-06-05 RX ORDER — FOLIC ACID 1 MG/1
1 TABLET ORAL DAILY
COMMUNITY

## 2023-06-05 RX ORDER — SODIUM CHLORIDE 9 MG/ML
INJECTION, SOLUTION INTRAVENOUS PRN
Status: DISCONTINUED | OUTPATIENT
Start: 2023-06-05 | End: 2023-06-09 | Stop reason: HOSPADM

## 2023-06-05 RX ORDER — THIAMINE MONONITRATE (VIT B1) 100 MG
100 TABLET ORAL DAILY
COMMUNITY

## 2023-06-05 RX ORDER — GABAPENTIN 300 MG/1
300 CAPSULE ORAL DAILY PRN
COMMUNITY

## 2023-06-05 RX ORDER — FOLIC ACID 1 MG/1
1 TABLET ORAL DAILY
Status: DISCONTINUED | OUTPATIENT
Start: 2023-06-06 | End: 2023-06-09 | Stop reason: HOSPADM

## 2023-06-05 RX ORDER — SODIUM CHLORIDE, SODIUM LACTATE, POTASSIUM CHLORIDE, AND CALCIUM CHLORIDE .6; .31; .03; .02 G/100ML; G/100ML; G/100ML; G/100ML
1000 INJECTION, SOLUTION INTRAVENOUS ONCE
Status: COMPLETED | OUTPATIENT
Start: 2023-06-05 | End: 2023-06-05

## 2023-06-05 RX ORDER — GABAPENTIN 300 MG/1
300 CAPSULE ORAL NIGHTLY
Status: DISCONTINUED | OUTPATIENT
Start: 2023-06-05 | End: 2023-06-07

## 2023-06-05 RX ORDER — SODIUM CHLORIDE 9 MG/ML
INJECTION, SOLUTION INTRAVENOUS CONTINUOUS
Status: DISCONTINUED | OUTPATIENT
Start: 2023-06-05 | End: 2023-06-05

## 2023-06-05 RX ORDER — ONDANSETRON 2 MG/ML
4 INJECTION INTRAMUSCULAR; INTRAVENOUS EVERY 6 HOURS PRN
Status: DISCONTINUED | OUTPATIENT
Start: 2023-06-05 | End: 2023-06-09 | Stop reason: HOSPADM

## 2023-06-05 RX ORDER — POLYETHYLENE GLYCOL 3350 17 G/17G
17 POWDER, FOR SOLUTION ORAL DAILY PRN
Status: DISCONTINUED | OUTPATIENT
Start: 2023-06-05 | End: 2023-06-09 | Stop reason: HOSPADM

## 2023-06-05 RX ORDER — LANOLIN ALCOHOL/MO/W.PET/CERES
1000 CREAM (GRAM) TOPICAL DAILY
COMMUNITY

## 2023-06-05 RX ORDER — SODIUM CHLORIDE 9 MG/ML
INJECTION, SOLUTION INTRAVENOUS CONTINUOUS
Status: DISCONTINUED | OUTPATIENT
Start: 2023-06-05 | End: 2023-06-09 | Stop reason: HOSPADM

## 2023-06-05 RX ORDER — CHLORPROMAZINE HYDROCHLORIDE 25 MG/1
25 TABLET, FILM COATED ORAL 3 TIMES DAILY PRN
Status: DISCONTINUED | OUTPATIENT
Start: 2023-06-05 | End: 2023-06-07

## 2023-06-05 RX ORDER — ACETAMINOPHEN 325 MG/1
650 TABLET ORAL EVERY 6 HOURS PRN
Status: DISCONTINUED | OUTPATIENT
Start: 2023-06-05 | End: 2023-06-09 | Stop reason: HOSPADM

## 2023-06-05 RX ORDER — LANOLIN ALCOHOL/MO/W.PET/CERES
3 CREAM (GRAM) TOPICAL NIGHTLY PRN
COMMUNITY

## 2023-06-05 RX ORDER — SODIUM CHLORIDE 0.9 % (FLUSH) 0.9 %
5-40 SYRINGE (ML) INJECTION PRN
Status: DISCONTINUED | OUTPATIENT
Start: 2023-06-05 | End: 2023-06-09 | Stop reason: HOSPADM

## 2023-06-05 RX ORDER — METOCLOPRAMIDE HYDROCHLORIDE 5 MG/ML
5 INJECTION INTRAMUSCULAR; INTRAVENOUS EVERY 6 HOURS PRN
Status: DISCONTINUED | OUTPATIENT
Start: 2023-06-05 | End: 2023-06-09 | Stop reason: HOSPADM

## 2023-06-05 RX ORDER — AMLODIPINE BESYLATE 5 MG/1
5 TABLET ORAL DAILY
Status: DISCONTINUED | OUTPATIENT
Start: 2023-06-06 | End: 2023-06-09 | Stop reason: HOSPADM

## 2023-06-05 RX ORDER — LANOLIN ALCOHOL/MO/W.PET/CERES
400 CREAM (GRAM) TOPICAL DAILY
COMMUNITY

## 2023-06-05 RX ORDER — LANOLIN ALCOHOL/MO/W.PET/CERES
3 CREAM (GRAM) TOPICAL NIGHTLY PRN
Status: DISCONTINUED | OUTPATIENT
Start: 2023-06-05 | End: 2023-06-09 | Stop reason: HOSPADM

## 2023-06-05 RX ORDER — PANTOPRAZOLE SODIUM 40 MG/10ML
40 INJECTION, POWDER, LYOPHILIZED, FOR SOLUTION INTRAVENOUS DAILY
Status: DISCONTINUED | OUTPATIENT
Start: 2023-06-06 | End: 2023-06-08

## 2023-06-05 RX ORDER — ONDANSETRON 4 MG/1
4 TABLET, ORALLY DISINTEGRATING ORAL EVERY 8 HOURS PRN
Status: DISCONTINUED | OUTPATIENT
Start: 2023-06-05 | End: 2023-06-09 | Stop reason: HOSPADM

## 2023-06-05 RX ORDER — CHLORPROMAZINE HYDROCHLORIDE 25 MG/ML
25 INJECTION INTRAMUSCULAR ONCE
Status: COMPLETED | OUTPATIENT
Start: 2023-06-05 | End: 2023-06-05

## 2023-06-05 RX ORDER — VALSARTAN 320 MG/1
320 TABLET ORAL DAILY
COMMUNITY

## 2023-06-05 RX ORDER — VALSARTAN 160 MG/1
320 TABLET ORAL DAILY
Status: DISCONTINUED | OUTPATIENT
Start: 2023-06-06 | End: 2023-06-09 | Stop reason: HOSPADM

## 2023-06-05 RX ORDER — POTASSIUM CHLORIDE 600 MG/1
8 TABLET, FILM COATED, EXTENDED RELEASE ORAL DAILY
COMMUNITY

## 2023-06-05 RX ORDER — SODIUM CHLORIDE 0.9 % (FLUSH) 0.9 %
5-40 SYRINGE (ML) INJECTION EVERY 12 HOURS SCHEDULED
Status: DISCONTINUED | OUTPATIENT
Start: 2023-06-05 | End: 2023-06-09 | Stop reason: HOSPADM

## 2023-06-05 RX ORDER — ENOXAPARIN SODIUM 100 MG/ML
40 INJECTION SUBCUTANEOUS DAILY
Status: DISCONTINUED | OUTPATIENT
Start: 2023-06-06 | End: 2023-06-09 | Stop reason: HOSPADM

## 2023-06-05 RX ORDER — METOCLOPRAMIDE HYDROCHLORIDE 5 MG/ML
10 INJECTION INTRAMUSCULAR; INTRAVENOUS ONCE
Status: COMPLETED | OUTPATIENT
Start: 2023-06-05 | End: 2023-06-05

## 2023-06-05 RX ORDER — ACETAMINOPHEN 650 MG/1
650 SUPPOSITORY RECTAL EVERY 6 HOURS PRN
Status: DISCONTINUED | OUTPATIENT
Start: 2023-06-05 | End: 2023-06-09 | Stop reason: HOSPADM

## 2023-06-05 RX ADMIN — GABAPENTIN 300 MG: 300 CAPSULE ORAL at 22:34

## 2023-06-05 RX ADMIN — METOCLOPRAMIDE 10 MG: 5 INJECTION, SOLUTION INTRAMUSCULAR; INTRAVENOUS at 19:40

## 2023-06-05 RX ADMIN — SODIUM CHLORIDE, POTASSIUM CHLORIDE, SODIUM LACTATE AND CALCIUM CHLORIDE 1000 ML: 600; 310; 30; 20 INJECTION, SOLUTION INTRAVENOUS at 18:15

## 2023-06-05 RX ADMIN — SODIUM CHLORIDE, PRESERVATIVE FREE 10 ML: 5 INJECTION INTRAVENOUS at 22:33

## 2023-06-05 RX ADMIN — CHLORPROMAZINE HYDROCHLORIDE 25 MG: 25 INJECTION INTRAMUSCULAR at 18:30

## 2023-06-05 RX ADMIN — SODIUM CHLORIDE: 9 INJECTION, SOLUTION INTRAVENOUS at 22:41

## 2023-06-05 RX ADMIN — ONDANSETRON 4 MG: 2 INJECTION INTRAMUSCULAR; INTRAVENOUS at 18:15

## 2023-06-05 ASSESSMENT — LIFESTYLE VARIABLES
HOW MANY STANDARD DRINKS CONTAINING ALCOHOL DO YOU HAVE ON A TYPICAL DAY: 1 OR 2
HOW OFTEN DO YOU HAVE A DRINK CONTAINING ALCOHOL: MONTHLY OR LESS

## 2023-06-05 ASSESSMENT — PAIN SCALES - GENERAL: PAINLEVEL_OUTOF10: 8

## 2023-06-05 ASSESSMENT — PAIN - FUNCTIONAL ASSESSMENT: PAIN_FUNCTIONAL_ASSESSMENT: 0-10

## 2023-06-05 ASSESSMENT — PAIN DESCRIPTION - LOCATION: LOCATION: ABDOMEN

## 2023-06-05 NOTE — ED PROVIDER NOTES
[address] PROVIDER NOTE    Patient Identification  Pt Name: Demetrice Perales  MRN: [id number]  [place] 1970  Date of evaluation: 6/5/2023  Provider: Anne-Marie Quiroga MD  PCP: No primary care provider on file. Chief Complaint  Nausea (Pt with chronic hiccups and n/v for 1 years, no answers as to why but has had some testing, waiting for call back from doc, pt states increased n/v starting today around 0300, 4 mg IM zofran given, pt states diaphoretic and hasn't ate or slept for a few days)      HPI  History provided by patient   This is a 48 y.o. male who presents to the ED for intractable hiccups. Has been ongoing for the past year. Worsening over the past 3 days. He has not been able to sleep for the past 2 days. Has not been able to eat or drink hardly anything. Has not been able to keep his medications down. He had manometry performed a couple of weeks ago and does not know the results of it. No fevers or chills. No chest pain or shortness of breath or abdominal pain. ROS  12 systems reviewed, pertinent positives/negatives per HPI otherwise noted to be negative. I have reviewed the following nursing documentation:  Allergies: Patient has no known allergies. Past medical history:   Past Medical History:   Diagnosis Date    Hypertension      Past surgical history:   Past Surgical History:   Procedure Laterality Date    AORTIC VALVE REPAIR         Home medications:   Current Discharge Medication List        CONTINUE these medications which have NOT CHANGED    Details   vitamin B-12 (CYANOCOBALAMIN) 1000 MCG tablet Take 1 tablet by mouth daily      chlorproMAZINE (THORAZINE) 25 MG tablet Take 1 tablet by mouth 3 times daily as needed (Hiccups) Take one tablet by mouth up to three times daily as needed for hiccups.       folic acid (FOLVITE) 1 MG tablet Take 1 tablet by mouth daily      vitamin B-1 (THIAMINE) 100 MG tablet Take 1 tablet by mouth daily      gabapentin

## 2023-06-06 ENCOUNTER — APPOINTMENT (OUTPATIENT)
Dept: MRI IMAGING | Age: 53
End: 2023-06-06
Payer: COMMERCIAL

## 2023-06-06 LAB
ALBUMIN SERPL-MCNC: 3.6 G/DL (ref 3.4–5)
ALBUMIN/GLOB SERPL: 1 {RATIO} (ref 1.1–2.2)
ALP SERPL-CCNC: 88 U/L (ref 40–129)
ALT SERPL-CCNC: 11 U/L (ref 10–40)
ANION GAP SERPL CALCULATED.3IONS-SCNC: 7 MMOL/L (ref 3–16)
AST SERPL-CCNC: 16 U/L (ref 15–37)
BASOPHILS # BLD: 0 K/UL (ref 0–0.2)
BASOPHILS NFR BLD: 0.6 %
BILIRUB SERPL-MCNC: 1 MG/DL (ref 0–1)
BUN SERPL-MCNC: 5 MG/DL (ref 7–20)
CALCIUM SERPL-MCNC: 9.2 MG/DL (ref 8.3–10.6)
CHLORIDE SERPL-SCNC: 104 MMOL/L (ref 99–110)
CO2 SERPL-SCNC: 26 MMOL/L (ref 21–32)
CREAT SERPL-MCNC: 0.8 MG/DL (ref 0.9–1.3)
DEPRECATED RDW RBC AUTO: 16.9 % (ref 12.4–15.4)
EOSINOPHIL # BLD: 0.2 K/UL (ref 0–0.6)
EOSINOPHIL NFR BLD: 2.3 %
GFR SERPLBLD CREATININE-BSD FMLA CKD-EPI: >60 ML/MIN/{1.73_M2}
GLUCOSE SERPL-MCNC: 95 MG/DL (ref 70–99)
HCT VFR BLD AUTO: 34.9 % (ref 40.5–52.5)
HGB BLD-MCNC: 11.4 G/DL (ref 13.5–17.5)
LYMPHOCYTES # BLD: 1.9 K/UL (ref 1–5.1)
LYMPHOCYTES NFR BLD: 25 %
MCH RBC QN AUTO: 28.9 PG (ref 26–34)
MCHC RBC AUTO-ENTMCNC: 32.6 G/DL (ref 31–36)
MCV RBC AUTO: 88.7 FL (ref 80–100)
MONOCYTES # BLD: 0.8 K/UL (ref 0–1.3)
MONOCYTES NFR BLD: 10.2 %
NEUTROPHILS # BLD: 4.7 K/UL (ref 1.7–7.7)
NEUTROPHILS NFR BLD: 61.9 %
PLATELET # BLD AUTO: 338 K/UL (ref 135–450)
PMV BLD AUTO: 8.1 FL (ref 5–10.5)
POTASSIUM SERPL-SCNC: 4.2 MMOL/L (ref 3.5–5.1)
PROT SERPL-MCNC: 7.2 G/DL (ref 6.4–8.2)
RBC # BLD AUTO: 3.93 M/UL (ref 4.2–5.9)
SODIUM SERPL-SCNC: 137 MMOL/L (ref 136–145)
WBC # BLD AUTO: 7.5 K/UL (ref 4–11)

## 2023-06-06 PROCEDURE — 85025 COMPLETE CBC W/AUTO DIFF WBC: CPT

## 2023-06-06 PROCEDURE — 6370000000 HC RX 637 (ALT 250 FOR IP): Performed by: NURSE PRACTITIONER

## 2023-06-06 PROCEDURE — 80053 COMPREHEN METABOLIC PANEL: CPT

## 2023-06-06 PROCEDURE — G0378 HOSPITAL OBSERVATION PER HR: HCPCS

## 2023-06-06 PROCEDURE — 2580000003 HC RX 258: Performed by: NURSE PRACTITIONER

## 2023-06-06 PROCEDURE — 6360000004 HC RX CONTRAST MEDICATION: Performed by: INTERNAL MEDICINE

## 2023-06-06 PROCEDURE — A9577 INJ MULTIHANCE: HCPCS | Performed by: INTERNAL MEDICINE

## 2023-06-06 PROCEDURE — C9113 INJ PANTOPRAZOLE SODIUM, VIA: HCPCS | Performed by: NURSE PRACTITIONER

## 2023-06-06 PROCEDURE — 6360000002 HC RX W HCPCS: Performed by: NURSE PRACTITIONER

## 2023-06-06 PROCEDURE — 70553 MRI BRAIN STEM W/O & W/DYE: CPT

## 2023-06-06 RX ADMIN — VALSARTAN 320 MG: 160 TABLET, FILM COATED ORAL at 08:09

## 2023-06-06 RX ADMIN — AMLODIPINE BESYLATE 5 MG: 5 TABLET ORAL at 08:09

## 2023-06-06 RX ADMIN — SODIUM CHLORIDE, PRESERVATIVE FREE 10 ML: 5 INJECTION INTRAVENOUS at 20:25

## 2023-06-06 RX ADMIN — GADOBENATE DIMEGLUMINE 19 ML: 529 INJECTION, SOLUTION INTRAVENOUS at 19:58

## 2023-06-06 RX ADMIN — ENOXAPARIN SODIUM 40 MG: 100 INJECTION SUBCUTANEOUS at 09:56

## 2023-06-06 RX ADMIN — Medication 1 MG: at 08:09

## 2023-06-06 RX ADMIN — GABAPENTIN 300 MG: 300 CAPSULE ORAL at 21:17

## 2023-06-06 RX ADMIN — PANTOPRAZOLE SODIUM 40 MG: 40 INJECTION, POWDER, FOR SOLUTION INTRAVENOUS at 09:55

## 2023-06-06 ASSESSMENT — PAIN SCALES - GENERAL
PAINLEVEL_OUTOF10: 0
PAINLEVEL_OUTOF10: 0

## 2023-06-06 NOTE — ED NOTES
Pt report called to 4T, states no questions or concerns. Pt transported to floor via wheelchair by transport with telemetry on.      Melonie Underwood RN  06/05/23 5438

## 2023-06-06 NOTE — PLAN OF CARE
Problem: Discharge Planning  Goal: Discharge to home or other facility with appropriate resources  6/6/2023 1038 by Jo Ann Hernandez RN  Outcome: Progressing  6/6/2023 1038 by Jo Ann Hernandez RN  Outcome: Progressing  Flowsheets (Taken 6/6/2023 0945)  Discharge to home or other facility with appropriate resources:   Identify barriers to discharge with patient and caregiver   Arrange for needed discharge resources and transportation as appropriate   Identify discharge learning needs (meds, wound care, etc)   Refer to discharge planning if patient needs post-hospital services based on physician order or complex needs related to functional status, cognitive ability or social support system  6/6/2023 0619 by Santos Terrazas RN  Outcome: Progressing     Problem: Pain  Goal: Verbalizes/displays adequate comfort level or baseline comfort level  Outcome: Progressing

## 2023-06-06 NOTE — H&P
negatives discussed in HPI     Objective:   No intake or output data in the 24 hours ending 06/05/23 2058   Vitals:   Vitals:    06/05/23 1900 06/05/23 1930 06/05/23 2000 06/05/23 2030   BP: 111/80 (!) 109/90 114/77 94/65   Pulse: 99 97  (!) 106   Resp:       Temp:       TempSrc:       SpO2: 97% 98% 96% 96%   Weight:       Height:           Medications Prior to Admission     Prior to Admission medications    Medication Sig Start Date End Date Taking? Authorizing Provider   vitamin B-12 (CYANOCOBALAMIN) 1000 MCG tablet Take 1 tablet by mouth daily   Yes Historical Provider, MD   chlorproMAZINE (THORAZINE) 25 MG tablet Take 1 tablet by mouth 3 times daily as needed (Hiccups) Take one tablet by mouth up to three times daily as needed for hiccups. Yes Historical Provider, MD   folic acid (FOLVITE) 1 MG tablet Take 1 tablet by mouth daily   Yes Historical Provider, MD   vitamin B-1 (THIAMINE) 100 MG tablet Take 1 tablet by mouth daily   Yes Historical Provider, MD   gabapentin (NEURONTIN) 300 MG capsule Take 1 capsule by mouth daily as needed.    Yes Historical Provider, MD   magnesium oxide (MAG-OX) 400 (240 Mg) MG tablet Take 1 tablet by mouth daily   Yes Historical Provider, MD   potassium chloride (KLOR-CON) 8 MEQ extended release tablet Take 1 tablet by mouth daily   Yes Historical Provider, MD   valsartan (DIOVAN) 320 MG tablet Take 1 tablet by mouth daily   Yes Historical Provider, MD   melatonin 3 MG TABS tablet Take 1 tablet by mouth nightly as needed   Yes Historical Provider, MD   ondansetron (ZOFRAN-ODT) 4 MG disintegrating tablet Take 1 tablet by mouth every 8 hours as needed for Nausea or Vomiting  Patient not taking: Reported on 6/5/2023 1/5/23   Godwin Ramos MD   amLODIPine (NORVASC) 10 MG tablet Take 1 tablet by mouth daily  Patient taking differently: Take 0.5 tablets by mouth daily 1/6/23   Godwin Ramos MD   Baclofen (LIORESAL) 5 MG tablet Take 1 tablet by mouth 3 times daily  Patient taking

## 2023-06-06 NOTE — CONSULTS
documentation in the EHR. I agree with the findings and recommended plan of care, as documented by the physician assistant/nurse practitioner. In summary, my findings and plan are the followin-year-old -American male presents with 1 year history of intractable hiccups in dysphagia. He had extensive work-up from different medical institution. 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 He has been on baclofen and Thorazine. CT chest, abdomen and pelvis in early  was normal. EGD at [facility] only showed a small hiatal hernia with a small gastric cardia ulcer. Vital signs are normal.  Abdomen is soft nontender no rebound or guarding. 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 EGJ outlet obstruction can sometimes lead to achalasia. We will order a barium esophagram to see if the patient has a typical bird's beak appearance that is seen in achalasia. Continue Thorazine and baclofen for now. Agree with MRI brain to make sure no other central causes of intractable hiccups.       Pola Vilchis MD, MSc  [facility]  23

## 2023-06-06 NOTE — CARE COORDINATION
Called insurance to check status of PA. PA still pending review.   Discharge Planning Assessment:     Patient admitted as Observation/OPIB with an anticipated short hospitalization length of stay. Chart reviewed and it appears that patient has minimal needs for discharge at this time. Discussed with patients nurse and requested that case management be notified if discharge needs are identified. *Case management will continue to follow progress and update discharge plan as needed.

## 2023-06-07 ENCOUNTER — APPOINTMENT (OUTPATIENT)
Dept: GENERAL RADIOLOGY | Age: 53
End: 2023-06-07
Payer: COMMERCIAL

## 2023-06-07 PROBLEM — R11.2 NAUSEA & VOMITING: Status: ACTIVE | Noted: 2023-06-07

## 2023-06-07 PROBLEM — I10 HTN (HYPERTENSION): Status: ACTIVE | Noted: 2023-06-07

## 2023-06-07 PROBLEM — R06.6 INTRACTABLE HICCUPS: Status: ACTIVE | Noted: 2023-06-07

## 2023-06-07 LAB
ALBUMIN SERPL-MCNC: 3.4 G/DL (ref 3.4–5)
ALBUMIN/GLOB SERPL: 1 {RATIO} (ref 1.1–2.2)
ALP SERPL-CCNC: 79 U/L (ref 40–129)
ALT SERPL-CCNC: 8 U/L (ref 10–40)
ANION GAP SERPL CALCULATED.3IONS-SCNC: 9 MMOL/L (ref 3–16)
AST SERPL-CCNC: 13 U/L (ref 15–37)
BASOPHILS # BLD: 0 K/UL (ref 0–0.2)
BASOPHILS NFR BLD: 0.6 %
BILIRUB SERPL-MCNC: 0.8 MG/DL (ref 0–1)
BUN SERPL-MCNC: 3 MG/DL (ref 7–20)
CALCIUM SERPL-MCNC: 8.7 MG/DL (ref 8.3–10.6)
CHLORIDE SERPL-SCNC: 104 MMOL/L (ref 99–110)
CO2 SERPL-SCNC: 22 MMOL/L (ref 21–32)
CREAT SERPL-MCNC: 0.7 MG/DL (ref 0.9–1.3)
DEPRECATED RDW RBC AUTO: 17 % (ref 12.4–15.4)
EOSINOPHIL # BLD: 0.2 K/UL (ref 0–0.6)
EOSINOPHIL NFR BLD: 3.1 %
GFR SERPLBLD CREATININE-BSD FMLA CKD-EPI: >60 ML/MIN/{1.73_M2}
GLUCOSE SERPL-MCNC: 90 MG/DL (ref 70–99)
HCT VFR BLD AUTO: 34.5 % (ref 40.5–52.5)
HGB BLD-MCNC: 11.2 G/DL (ref 13.5–17.5)
LYMPHOCYTES # BLD: 1.9 K/UL (ref 1–5.1)
LYMPHOCYTES NFR BLD: 24.5 %
MCH RBC QN AUTO: 28.9 PG (ref 26–34)
MCHC RBC AUTO-ENTMCNC: 32.5 G/DL (ref 31–36)
MCV RBC AUTO: 89.2 FL (ref 80–100)
MONOCYTES # BLD: 0.8 K/UL (ref 0–1.3)
MONOCYTES NFR BLD: 9.7 %
NEUTROPHILS # BLD: 4.8 K/UL (ref 1.7–7.7)
NEUTROPHILS NFR BLD: 62.1 %
PLATELET # BLD AUTO: 316 K/UL (ref 135–450)
PMV BLD AUTO: 7.9 FL (ref 5–10.5)
POTASSIUM SERPL-SCNC: 3.8 MMOL/L (ref 3.5–5.1)
PROT SERPL-MCNC: 6.8 G/DL (ref 6.4–8.2)
RBC # BLD AUTO: 3.87 M/UL (ref 4.2–5.9)
SODIUM SERPL-SCNC: 135 MMOL/L (ref 136–145)
WBC # BLD AUTO: 7.8 K/UL (ref 4–11)

## 2023-06-07 PROCEDURE — 6370000000 HC RX 637 (ALT 250 FOR IP): Performed by: NURSE PRACTITIONER

## 2023-06-07 PROCEDURE — 97165 OT EVAL LOW COMPLEX 30 MIN: CPT

## 2023-06-07 PROCEDURE — 6360000002 HC RX W HCPCS: Performed by: NURSE PRACTITIONER

## 2023-06-07 PROCEDURE — 6370000000 HC RX 637 (ALT 250 FOR IP): Performed by: INTERNAL MEDICINE

## 2023-06-07 PROCEDURE — 2580000003 HC RX 258: Performed by: NURSE PRACTITIONER

## 2023-06-07 PROCEDURE — 1200000000 HC SEMI PRIVATE

## 2023-06-07 PROCEDURE — 85025 COMPLETE CBC W/AUTO DIFF WBC: CPT

## 2023-06-07 PROCEDURE — C9113 INJ PANTOPRAZOLE SODIUM, VIA: HCPCS | Performed by: NURSE PRACTITIONER

## 2023-06-07 PROCEDURE — 74220 X-RAY XM ESOPHAGUS 1CNTRST: CPT

## 2023-06-07 PROCEDURE — 97535 SELF CARE MNGMENT TRAINING: CPT

## 2023-06-07 PROCEDURE — 80053 COMPREHEN METABOLIC PANEL: CPT

## 2023-06-07 RX ORDER — CHLORPROMAZINE HYDROCHLORIDE 25 MG/1
25 TABLET, FILM COATED ORAL 3 TIMES DAILY
Status: DISCONTINUED | OUTPATIENT
Start: 2023-06-07 | End: 2023-06-09 | Stop reason: HOSPADM

## 2023-06-07 RX ORDER — BACLOFEN 10 MG/1
10 TABLET ORAL 3 TIMES DAILY
Status: DISCONTINUED | OUTPATIENT
Start: 2023-06-07 | End: 2023-06-08

## 2023-06-07 RX ORDER — GABAPENTIN 300 MG/1
300 CAPSULE ORAL 3 TIMES DAILY
Status: DISCONTINUED | OUTPATIENT
Start: 2023-06-07 | End: 2023-06-09 | Stop reason: HOSPADM

## 2023-06-07 RX ADMIN — GABAPENTIN 300 MG: 300 CAPSULE ORAL at 22:13

## 2023-06-07 RX ADMIN — BACLOFEN 10 MG: 10 TABLET ORAL at 22:13

## 2023-06-07 RX ADMIN — ENOXAPARIN SODIUM 40 MG: 100 INJECTION SUBCUTANEOUS at 08:10

## 2023-06-07 RX ADMIN — GABAPENTIN 300 MG: 300 CAPSULE ORAL at 16:10

## 2023-06-07 RX ADMIN — AMLODIPINE BESYLATE 5 MG: 5 TABLET ORAL at 08:10

## 2023-06-07 RX ADMIN — BACLOFEN 10 MG: 10 TABLET ORAL at 16:10

## 2023-06-07 RX ADMIN — PANTOPRAZOLE SODIUM 40 MG: 40 INJECTION, POWDER, FOR SOLUTION INTRAVENOUS at 08:10

## 2023-06-07 RX ADMIN — Medication 1 MG: at 08:10

## 2023-06-07 RX ADMIN — VALSARTAN 320 MG: 160 TABLET, FILM COATED ORAL at 08:10

## 2023-06-07 RX ADMIN — SODIUM CHLORIDE: 9 INJECTION, SOLUTION INTRAVENOUS at 01:30

## 2023-06-07 RX ADMIN — CHLORPROMAZINE HYDROCHLORIDE 25 MG: 25 TABLET, FILM COATED ORAL at 22:14

## 2023-06-07 RX ADMIN — CHLORPROMAZINE HYDROCHLORIDE 25 MG: 25 TABLET, FILM COATED ORAL at 16:10

## 2023-06-07 RX ADMIN — SODIUM CHLORIDE, PRESERVATIVE FREE 10 ML: 5 INJECTION INTRAVENOUS at 08:10

## 2023-06-07 ASSESSMENT — PAIN SCALES - GENERAL: PAINLEVEL_OUTOF10: 0

## 2023-06-07 NOTE — PLAN OF CARE
Problem: Discharge Planning  Goal: Discharge to home or other facility with appropriate resources  6/7/2023 0808 by Xiang Deshpande RN  Outcome: Progressing  Flowsheets (Taken 6/7/2023 0800)  Discharge to home or other facility with appropriate resources:   Identify barriers to discharge with patient and caregiver   Arrange for needed discharge resources and transportation as appropriate   Identify discharge learning needs (meds, wound care, etc)   Refer to discharge planning if patient needs post-hospital services based on physician order or complex needs related to functional status, cognitive ability or social support system  6/7/2023 0125 by Cliff Arroyo RN  Outcome: Progressing     Problem: Pain  Goal: Verbalizes/displays adequate comfort level or baseline comfort level  6/7/2023 0808 by Xiang Deshpande RN  Outcome: Progressing  6/7/2023 0125 by Cliff Arroyo RN  Outcome: Progressing

## 2023-06-08 ENCOUNTER — ANESTHESIA EVENT (OUTPATIENT)
Dept: ENDOSCOPY | Age: 53
End: 2023-06-08
Payer: COMMERCIAL

## 2023-06-08 ENCOUNTER — ANESTHESIA (OUTPATIENT)
Dept: ENDOSCOPY | Age: 53
End: 2023-06-08
Payer: COMMERCIAL

## 2023-06-08 PROCEDURE — 6360000002 HC RX W HCPCS: Performed by: NURSE ANESTHETIST, CERTIFIED REGISTERED

## 2023-06-08 PROCEDURE — 2500000003 HC RX 250 WO HCPCS: Performed by: NURSE ANESTHETIST, CERTIFIED REGISTERED

## 2023-06-08 PROCEDURE — 2580000003 HC RX 258: Performed by: ANESTHESIOLOGY

## 2023-06-08 PROCEDURE — 1200000000 HC SEMI PRIVATE

## 2023-06-08 PROCEDURE — 2580000003 HC RX 258: Performed by: INTERNAL MEDICINE

## 2023-06-08 PROCEDURE — 3700000000 HC ANESTHESIA ATTENDED CARE: Performed by: INTERNAL MEDICINE

## 2023-06-08 PROCEDURE — 3609017700 HC EGD DILATION GASTRIC/DUODENAL STRICTURE: Performed by: INTERNAL MEDICINE

## 2023-06-08 PROCEDURE — 6370000000 HC RX 637 (ALT 250 FOR IP): Performed by: INTERNAL MEDICINE

## 2023-06-08 PROCEDURE — 3700000001 HC ADD 15 MINUTES (ANESTHESIA): Performed by: INTERNAL MEDICINE

## 2023-06-08 PROCEDURE — 7100000000 HC PACU RECOVERY - FIRST 15 MIN: Performed by: INTERNAL MEDICINE

## 2023-06-08 PROCEDURE — 7100000001 HC PACU RECOVERY - ADDTL 15 MIN: Performed by: INTERNAL MEDICINE

## 2023-06-08 PROCEDURE — 2709999900 HC NON-CHARGEABLE SUPPLY: Performed by: INTERNAL MEDICINE

## 2023-06-08 PROCEDURE — C1726 CATH, BAL DIL, NON-VASCULAR: HCPCS | Performed by: INTERNAL MEDICINE

## 2023-06-08 RX ORDER — PROPOFOL 10 MG/ML
INJECTION, EMULSION INTRAVENOUS PRN
Status: DISCONTINUED | OUTPATIENT
Start: 2023-06-08 | End: 2023-06-08 | Stop reason: SDUPTHER

## 2023-06-08 RX ORDER — BACLOFEN 10 MG/1
15 TABLET ORAL 3 TIMES DAILY
Status: DISCONTINUED | OUTPATIENT
Start: 2023-06-08 | End: 2023-06-09 | Stop reason: HOSPADM

## 2023-06-08 RX ORDER — TRAZODONE HYDROCHLORIDE 50 MG/1
50 TABLET ORAL NIGHTLY PRN
Status: DISCONTINUED | OUTPATIENT
Start: 2023-06-08 | End: 2023-06-09 | Stop reason: HOSPADM

## 2023-06-08 RX ORDER — LIDOCAINE HYDROCHLORIDE 20 MG/ML
INJECTION, SOLUTION INFILTRATION; PERINEURAL PRN
Status: DISCONTINUED | OUTPATIENT
Start: 2023-06-08 | End: 2023-06-08 | Stop reason: SDUPTHER

## 2023-06-08 RX ORDER — SODIUM CHLORIDE 9 MG/ML
INJECTION, SOLUTION INTRAVENOUS CONTINUOUS
Status: DISCONTINUED | OUTPATIENT
Start: 2023-06-08 | End: 2023-06-08 | Stop reason: HOSPADM

## 2023-06-08 RX ORDER — PANTOPRAZOLE SODIUM 40 MG/1
40 TABLET, DELAYED RELEASE ORAL
Status: DISCONTINUED | OUTPATIENT
Start: 2023-06-09 | End: 2023-06-09 | Stop reason: HOSPADM

## 2023-06-08 RX ADMIN — GABAPENTIN 300 MG: 300 CAPSULE ORAL at 21:27

## 2023-06-08 RX ADMIN — TRAZODONE HYDROCHLORIDE 50 MG: 50 TABLET ORAL at 22:46

## 2023-06-08 RX ADMIN — GABAPENTIN 300 MG: 300 CAPSULE ORAL at 13:44

## 2023-06-08 RX ADMIN — LIDOCAINE HYDROCHLORIDE 100 MG: 20 INJECTION, SOLUTION INFILTRATION; PERINEURAL at 12:52

## 2023-06-08 RX ADMIN — BACLOFEN 15 MG: 10 TABLET ORAL at 13:44

## 2023-06-08 RX ADMIN — CHLORPROMAZINE HYDROCHLORIDE 25 MG: 25 TABLET, FILM COATED ORAL at 21:27

## 2023-06-08 RX ADMIN — CHLORPROMAZINE HYDROCHLORIDE 25 MG: 25 TABLET, FILM COATED ORAL at 13:44

## 2023-06-08 RX ADMIN — VALSARTAN 320 MG: 160 TABLET, FILM COATED ORAL at 13:46

## 2023-06-08 RX ADMIN — SODIUM CHLORIDE: 9 INJECTION, SOLUTION INTRAVENOUS at 21:32

## 2023-06-08 RX ADMIN — AMLODIPINE BESYLATE 5 MG: 5 TABLET ORAL at 13:45

## 2023-06-08 RX ADMIN — SODIUM CHLORIDE: 9 INJECTION, SOLUTION INTRAVENOUS at 11:27

## 2023-06-08 RX ADMIN — PROPOFOL 100 MG: 10 INJECTION, EMULSION INTRAVENOUS at 12:52

## 2023-06-08 RX ADMIN — Medication 1 MG: at 13:46

## 2023-06-08 RX ADMIN — BACLOFEN 15 MG: 10 TABLET ORAL at 21:27

## 2023-06-08 ASSESSMENT — ENCOUNTER SYMPTOMS: SHORTNESS OF BREATH: 0

## 2023-06-08 ASSESSMENT — PAIN - FUNCTIONAL ASSESSMENT: PAIN_FUNCTIONAL_ASSESSMENT: NONE - DENIES PAIN

## 2023-06-08 NOTE — ANESTHESIA PRE PROCEDURE
NPO Status: Time of last liquid consumption: 2000                        Time of last solid consumption: 2000                        Date of last liquid consumption: 06/07/23                        Date of last solid food consumption: 06/07/23    BMI:   Wt Readings from Last 3 Encounters:   06/08/23 221 lb (100.2 kg)   06/06/23 221 lb 6.4 oz (100.4 kg)   01/02/23 194 lb 14.2 oz (88.4 kg)     Body mass index is 28.37 kg/m². CBC:   Lab Results   Component Value Date/Time    WBC 7.8 06/07/2023 05:44 AM    RBC 3.87 06/07/2023 05:44 AM    HGB 11.2 06/07/2023 05:44 AM    HCT 34.5 06/07/2023 05:44 AM    MCV 89.2 06/07/2023 05:44 AM    RDW 17.0 06/07/2023 05:44 AM     06/07/2023 05:44 AM       CMP:   Lab Results   Component Value Date/Time     06/07/2023 05:44 AM    K 3.8 06/07/2023 05:44 AM    K 4.2 06/06/2023 04:58 AM     06/07/2023 05:44 AM    CO2 22 06/07/2023 05:44 AM    BUN 3 06/07/2023 05:44 AM    CREATININE 0.7 06/07/2023 05:44 AM    AGRATIO 1.0 06/07/2023 05:44 AM    LABGLOM >60 06/07/2023 05:44 AM    GLUCOSE 90 06/07/2023 05:44 AM    PROT 6.8 06/07/2023 05:44 AM    CALCIUM 8.7 06/07/2023 05:44 AM    BILITOT 0.8 06/07/2023 05:44 AM    ALKPHOS 79 06/07/2023 05:44 AM    AST 13 06/07/2023 05:44 AM    ALT 8 06/07/2023 05:44 AM       POC Tests: No results for input(s): POCGLU, POCNA, POCK, POCCL, POCBUN, POCHEMO, POCHCT in the last 72 hours.     Coags: No results found for: PROTIME, INR, APTT    HCG (If Applicable): No results found for: PREGTESTUR, PREGSERUM, HCG, HCGQUANT     ABGs: No results found for: PHART, PO2ART, VRA4QXO, IER7ZRZ, BEART, S6BRLCCA     Type & Screen (If Applicable):  No results found for: LABABO, LABRH    Drug/Infectious Status (If Applicable):  No results found for: HIV, HEPCAB    COVID-19 Screening (If Applicable): No results found for: COVID19        Anesthesia Evaluation  Patient summary reviewed and Nursing notes reviewed no history of anesthetic complications:

## 2023-06-08 NOTE — BRIEF OP NOTE
Brief Postoperative Note      Patient: Demetrice Perales  YOB: 1970  MRN: [id number]    Date of Procedure: 6/8/2023    Pre-Op Diagnosis Codes: * Dysphagia, unspecified type [R13.10]        Procedure(s):  EGD DILATION BALLOON    Surgeon(s):  Jessica Durham MD    Anesthesia: Monitor Anesthesia Care    Estimated Blood Loss (mL): Minimal    Complications: None    Findings:   2 cm hiatal hernia. Z-line was regular. No obvious stricture in the distal esophagus. This was empirically balloon dilated up to 20 mm (no mucosal breaks). Pooling of secreations in the esophagus, c/w esophageal dysmotility      Plans: Follow clinical response to endoscopic dilation. Dysphagia diet (minced and moist). Pantoprazole 40 mg daily. Continue Thorazine and Baclofen. Will likely need a tertiary care physician [facility]) for phrenic nerve block to treat his intractable hiccups. May discharge from GI standpoint. GI will sign off. Please call if you have questions.         Electronically signed by Jessica Durham MD on 6/8/2023 at 1:07 PM Writer met with patient to discuss CM role.  All BETH's remain in effect from previous level of care.  Patient unable to do virtual treatment.  Patient interested in stepping down to IOP upon discharge. Patient would like writer to schedule him to see a new PCP.  Patient signed BETH for PCP. Patient does not have a  at this time, but he is interested in writer making a referral for services through [facility].  Kelly Camacho LCSW  1/12/2023

## 2023-06-08 NOTE — PLAN OF CARE
Problem: Discharge Planning  Goal: Discharge to home or other facility with appropriate resources  Outcome: Progressing  Flowsheets (Taken 6/8/2023 2372)  Discharge to home or other facility with appropriate resources:   Identify barriers to discharge with patient and caregiver   Arrange for needed discharge resources and transportation as appropriate   Identify discharge learning needs (meds, wound care, etc)   Refer to discharge planning if patient needs post-hospital services based on physician order or complex needs related to functional status, cognitive ability or social support system     Problem: Pain  Goal: Verbalizes/displays adequate comfort level or baseline comfort level  Outcome: Progressing     Problem: Safety - Adult  Goal: Free from fall injury  Outcome: Progressing     Problem: ABCDS Injury Assessment  Goal: Absence of physical injury  Outcome: Progressing

## 2023-06-08 NOTE — ANESTHESIA POSTPROCEDURE EVALUATION
Department of Anesthesiology  Postprocedure Note    Patient: Sierra Pitts  MRN: [id number]  YOB: 1970  Date of evaluation: 6/8/2023      Procedure Summary     Date: 06/08/23 Room / Location: [address] [place] [address]    Anesthesia Start: 4472 Anesthesia Stop: 0513    Procedure: EGD DILATION BALLOON (Abdomen) Diagnosis:       Dysphagia, unspecified type      (Dysphagia, unspecified type [R13.10])    Surgeons: Jayme Crooks MD Responsible Provider: Felisa Elmore MD    Anesthesia Type: MAC ASA Status: 2          Anesthesia Type: No value filed.     Sonal Phase I: Sonal Score: 10    Sonal Phase II:        Anesthesia Post Evaluation    Patient location during evaluation: PACU  Patient participation: complete - patient participated  Level of consciousness: awake  Airway patency: patent  Nausea & Vomiting: no vomiting and no nausea  Complications: no  Cardiovascular status: hemodynamically stable  Respiratory status: acceptable  Hydration status: stable  Multimodal analgesia pain management approach

## 2023-06-09 VITALS
DIASTOLIC BLOOD PRESSURE: 97 MMHG | BODY MASS INDEX: 28.36 KG/M2 | HEIGHT: 74 IN | WEIGHT: 221 LBS | SYSTOLIC BLOOD PRESSURE: 147 MMHG | OXYGEN SATURATION: 97 % | TEMPERATURE: 98 F | HEART RATE: 102 BPM | RESPIRATION RATE: 16 BRPM

## 2023-06-09 PROCEDURE — 6370000000 HC RX 637 (ALT 250 FOR IP): Performed by: INTERNAL MEDICINE

## 2023-06-09 PROCEDURE — 2580000003 HC RX 258: Performed by: INTERNAL MEDICINE

## 2023-06-09 PROCEDURE — 6360000002 HC RX W HCPCS: Performed by: INTERNAL MEDICINE

## 2023-06-09 RX ORDER — CHLORPROMAZINE HYDROCHLORIDE 25 MG/1
25 TABLET, FILM COATED ORAL 3 TIMES DAILY
Qty: 90 TABLET | Refills: 1 | Status: SHIPPED | OUTPATIENT
Start: 2023-06-09 | End: 2023-08-08

## 2023-06-09 RX ORDER — PANTOPRAZOLE SODIUM 40 MG/1
40 TABLET, DELAYED RELEASE ORAL
Qty: 30 TABLET | Refills: 3 | Status: SHIPPED | OUTPATIENT
Start: 2023-06-10

## 2023-06-09 RX ORDER — AMLODIPINE BESYLATE 10 MG/1
5 TABLET ORAL DAILY
Qty: 30 TABLET | Refills: 0 | Status: SHIPPED | OUTPATIENT
Start: 2023-06-09 | End: 2023-08-08

## 2023-06-09 RX ORDER — TRAZODONE HYDROCHLORIDE 50 MG/1
50 TABLET ORAL NIGHTLY PRN
Qty: 30 TABLET | Refills: 1 | Status: SHIPPED | OUTPATIENT
Start: 2023-06-09

## 2023-06-09 RX ORDER — BACLOFEN 5 MG/1
15 TABLET ORAL 3 TIMES DAILY
Qty: 270 TABLET | Refills: 1 | Status: SHIPPED | OUTPATIENT
Start: 2023-06-09 | End: 2023-08-08

## 2023-06-09 RX ADMIN — PANTOPRAZOLE SODIUM 40 MG: 40 TABLET, DELAYED RELEASE ORAL at 06:15

## 2023-06-09 RX ADMIN — AMLODIPINE BESYLATE 5 MG: 5 TABLET ORAL at 07:20

## 2023-06-09 RX ADMIN — BACLOFEN 15 MG: 10 TABLET ORAL at 07:19

## 2023-06-09 RX ADMIN — SODIUM CHLORIDE, PRESERVATIVE FREE 10 ML: 5 INJECTION INTRAVENOUS at 07:20

## 2023-06-09 RX ADMIN — Medication 1 MG: at 07:19

## 2023-06-09 RX ADMIN — BACLOFEN 15 MG: 10 TABLET ORAL at 13:00

## 2023-06-09 RX ADMIN — VALSARTAN 320 MG: 160 TABLET, FILM COATED ORAL at 07:20

## 2023-06-09 RX ADMIN — CHLORPROMAZINE HYDROCHLORIDE 25 MG: 25 TABLET, FILM COATED ORAL at 07:20

## 2023-06-09 RX ADMIN — CHLORPROMAZINE HYDROCHLORIDE 25 MG: 25 TABLET, FILM COATED ORAL at 13:00

## 2023-06-09 RX ADMIN — GABAPENTIN 300 MG: 300 CAPSULE ORAL at 07:20

## 2023-06-09 RX ADMIN — GABAPENTIN 300 MG: 300 CAPSULE ORAL at 13:00

## 2023-06-09 RX ADMIN — ENOXAPARIN SODIUM 40 MG: 100 INJECTION SUBCUTANEOUS at 07:18

## 2023-06-09 ASSESSMENT — PAIN SCALES - GENERAL
PAINLEVEL_OUTOF10: 0

## 2023-06-09 NOTE — PROGRESS NOTES
[address] [facility],6[address] Floor Msb Department   Phone: [phone]    Occupational Therapy    [x] Initial Evaluation            [] Daily Treatment Note         [x] Discharge Summary      Patient: Jake Sevilla   : 1970   MRN: [id number]   Date of Service:  2023    Admitting Diagnosis:  Intractable hiccups  Current Admission Summary: This is a 48 y.o. male who presents to the ED for intractable hiccups. Has been ongoing for the past year. Worsening over the past 3 days. He has not been able to sleep for the past 2 days. Has not been able to eat or drink hardly anything. Has not been able to keep his medications down. He had manometry performed a couple of weeks ago and does not know the results of it. No fevers or chills. No chest pain or shortness of breath or abdominal pain. Past Medical History:  has a past medical history of Hypertension. Past Surgical History:  has a past surgical history that includes Aortic valve repair. Discharge Recommendations: Jake Sevilla scored a 24/24 on the [facility] ADL Inpatient form. At this time, no further OT is recommended upon discharge due to patient currently functioning at baseline. Recommend patient returns to prior setting with prior services. DME Required For Discharge: no DME required at discharge    Precautions/Restrictions: no restrictions  Weight Bearing Restrictions: no restrictions  [] Right Upper Extremity  [] Left Upper Extremity [] Right Lower Extremity  [] Left Lower Extremity     Required Braces/Orthotics: no braces required   [] Right  [] Left  Positional Restrictions:no positional restrictions    Pre-Admission Information   Lives With: alone    Type of Home: apartment  Home Layout: one level, able to live on main level  Home Access:  10 step to enter with handrail. Handrails are located on B side.   Bathroom Layout: tub/shower unit, walk in shower  Bathroom Equipment: grab bars in shower, grab
Anesthesia notified of elevated B/P. No new orders at this time.
CLINICAL PHARMACY NOTE: MEDS TO BEDS    Total # of Prescriptions Filled: 3   The following medications were delivered to the patient:  Amlodipine 10mg  Trazodone 50mg  Pantoprazle 40mg    Additional Documentation:     Medications were picked up in the Outpatient Pharmacy by MADELINE Esparza CPhT
Dilation distal esophagus negative for heme 20mm
Gastroenterology Progress Note    Jake Sevilla is a 48 y.o. male patient. Principal Problem:    Intractable nausea and vomiting  Resolved Problems:    * No resolved hospital problems. *      SUBJECTIVE:  Hungry, still with hiccups and mild nausea. ROS:  No fever, chills  No chest pain, palpitations  No SOB, cough  Gastrointestinal ROS: see above    Physical    VITALS:  BP (!) 138/97   Pulse 95   Temp 97.1 °F (36.2 °C) (Oral)   Resp 18   Ht 6' 2\" (1.88 m)   Wt 221 lb 6.4 oz (100.4 kg)   SpO2 97%   BMI 28.43 kg/m²   TEMPERATURE:  Current - Temp: 97.1 °F (36.2 °C); Max - Temp  Av.7 °F (36.5 °C)  Min: 97.1 °F (36.2 °C)  Max: 98.2 °F (36.8 °C)    NAD  Regular rate   Lungs CTA Bilaterally  Abdomen soft, ND, NT,  Bowel sounds normal.    Data    Data Review:    Recent Labs     23  0544   WBC 8.4 7.5 7.8   HGB 12.0* 11.4* 11.2*   HCT 36.7* 34.9* 34.5*   MCV 88.1 88.7 89.2    338 316     Recent Labs     23  0544   * 137 135*   K 3.7 4.2 3.8   CL 97* 104 104   CO2 21 26 22   BUN <2* 5* 3*   CREATININE 0.7* 0.8* 0.7*     Recent Labs     23  0544   AST 19 16 13*   ALT 10 11 8*   BILITOT 0.8 1.0 0.8   ALKPHOS 93 88 79     No results for input(s): LIPASE, AMYLASE in the last 72 hours. No results for input(s): PROTIME, INR in the last 72 hours. No results for input(s): PTT in the last 72 hours. ASSESSMENT :  Dysphagia -[place] [facility] [place] EGJ outflow obstruction. 79 okay esophageal manometry revealed EGJ junction outlet obstruction (typically IRP is more than 20 with normal esophageal peristalsis and DCI). EGJ outlet obstruction can sometimes lead to achalasia. Esophagram with stricture, mild dysmotility.    - PPI  - EGD with dilation tomorrow with dr. mathews  - adv to fulls liquids today, npo after mn     Intractable hiccups -despite Thorazine and Baclofen.  No
PM assessment complete, vitals stable, medications given per MAR, no complaints of pain. Patient independent in the room. Patient aware of NPO status at midnight.
Patient received discharge instructions along with a list of medications, why they take the particular medication and when it is due the next time. Pt taken to outpt pharmacy to  medication prior to discharge home. Patient verbalized understanding of discharge instructions. Patient left with family via wheel chair to be transported home by mother in personal vehicle.      Brett BOWIE, RN   [phone]
Pharmacy Home Medication Reconciliation Note    A medication reconciliation has been completed for [facility] 1970    Pharmacy: [facility] [address]  Information provided by: patient    The patient's home medication list is as follows: No current facility-administered medications on file prior to encounter. Current Outpatient Medications on File Prior to Encounter   Medication Sig Dispense Refill    vitamin B-12 (CYANOCOBALAMIN) 1000 MCG tablet Take 1 tablet by mouth daily      chlorproMAZINE (THORAZINE) 25 MG tablet Take 1 tablet by mouth 3 times daily as needed (Hiccups) Take one tablet by mouth up to three times daily as needed for hiccups. folic acid (FOLVITE) 1 MG tablet Take 1 tablet by mouth daily      vitamin B-1 (THIAMINE) 100 MG tablet Take 1 tablet by mouth daily      gabapentin (NEURONTIN) 300 MG capsule Take 1 capsule by mouth daily as needed. magnesium oxide (MAG-OX) 400 (240 Mg) MG tablet Take 1 tablet by mouth daily      potassium chloride (KLOR-CON) 8 MEQ extended release tablet Take 1 tablet by mouth daily      valsartan (DIOVAN) 320 MG tablet Take 1 tablet by mouth daily      melatonin 3 MG TABS tablet Take 1 tablet by mouth nightly as needed      ondansetron (ZOFRAN-ODT) 4 MG disintegrating tablet Take 1 tablet by mouth every 8 hours as needed for Nausea or Vomiting (Patient not taking: Reported on 6/5/2023) 30 tablet 0    amLODIPine (NORVASC) 10 MG tablet Take 1 tablet by mouth daily (Patient taking differently: Take 0.5 tablets by mouth daily) 30 tablet 3    Baclofen (LIORESAL) 5 MG tablet Take 1 tablet by mouth 3 times daily (Patient taking differently: Take 2 tablets by mouth 3 times daily) 90 tablet 0    pantoprazole (PROTONIX) 40 MG tablet Take 1 tablet by mouth in the morning and at bedtime (Patient not taking: Reported on 6/5/2023) 60 tablet 1       Patient is no longer taking pantoprazole or ondansetron.     Of note, patient
Physical Therapy  Orders received and chart reviewed. Pt independent at this time without any needs. Will discharge orders.    Thanks,  Nico Langford, DPT [id number]
Pt arrived from endo to PACU bay 9. Reported received from endo rn/crna staff. Pt  arousable to voice. Pt on RA, NSR, and VSS. Will continue to monitor.
Pt removed tele.
Pt return to unit  from MRI.
Pt stable and able to be transferred from PACU to room 4481. A&O , VSS, with no complaints at this time. 4T called and notified that pt is being transferred out of PACU and to room.
Pt transferred to room 4881 at this time. A&O with no signs of distress. Report given to [facility] RN. V/u and denies questions or further needs at this time.
Reviewed pt problem list, history, H&P and assessment preoperatively. Scope verified using 2 person system.       Electronically signed by Travis Davies RN on 6/8/2023 at 12:48 PM
Shift assessment complete, VSS, A&Ox4. Morning medications administered per MAR. Patient reports no pain at this time. Patient independent in room. Patient states no further needs at this time. Call light and personal belongings within reach.
Shift assessment complete, VSS, A&Ox4. Morning medications held due to NPO status for EGD this morning. Patient reports no pain. Patient states no further needs at this time. Call light and personal belongings within reach.    2:26 PM  Patient back to floor from EGD, upset about his diet order. This nurse called and left a message for Dr. Daisy Crow stating that patient is refusing to eat dysphagia minced and moist diet and wants regular diet.
Teaching / education initiated regarding perioperative experience, expectations, and pain management during stay. Patient verbalized understanding.
PM    RBCUA None seen 01/02/2023 04:26 PM    BLOODU Negative 01/02/2023 04:26 PM    SPECGRAV <=1.005 01/02/2023 04:26 PM    GLUCOSEU Negative 01/02/2023 04:26 PM       Radiology:  MRI BRAIN [address]    (Results Pending)   FL ESOPHAGRAM    (Results Pending)       Assessment/Plan:    Active Hospital Problems    Diagnosis Date Noted    Intractable nausea and vomiting [R11.2] 01/02/2023     Priority: Medium       Acute Medical Issues Being Addressed:    28-year-old admitted to the hospital hospital with intractable nausea and hiccups    Intractable nausea  recent diagnosis of esophageal gastric outflow obstruction  Apparently had manometry  Has had intermittent hiccups for the last year  Seen by GI  Barium esophagram has been ordered  For now we will keep him on clear liquids and IV fluids  As needed antiemetics  He has a history of a subdural in the past  CT chest Abdo pelvis dysuria in January was essentially negative  We will get MRI of the brain with and without call contrast to ensure that there is nothing intracranial causing the hiccups  Has associated weight loss    Hypertension  Continue home medications        DVT Prophylaxis: Subcutaneousl ovenox   Diet: ADULT DIET;  Clear Liquid  Code Status: Full Code      Dispo - once acute medical processes have resolved    Abhijit Whitman MD
0.8 1.0 0.8   ALKPHOS 93 88 79       No results for input(s): CKTOTAL, TROPONINI in the last 72 hours. Urinalysis:    Lab Results   Component Value Date/Time    NITRU Negative 01/02/2023 04:26 PM    WBCUA 0-2 01/02/2023 04:26 PM    BACTERIA 1+ 01/02/2023 04:26 PM    RBCUA None seen 01/02/2023 04:26 PM    BLOODU Negative 01/02/2023 04:26 PM    SPECGRAV <=1.005 01/02/2023 04:26 PM    GLUCOSEU Negative 01/02/2023 04:26 PM       Radiology:  FL ESOPHAGRAM   Final Result   Small hiatal hernia with moderate luminal narrowing proximal to the hernia   sac that temporarily impeded passage of barium tablet. Spontaneous gastroesophageal reflux to the level of the mid esophagus. Mild esophageal dysmotility. Duodenal diverticulum. MRI BRAIN W WO CONTRAST   Final Result   1. No acute intracranial abnormality. 2. Rule right-sided craniotomy with underlying chronic dural thickening. Schuyler Boyd MD      Please excuse brevity and/or typos. This report was transcribed using voice recognition software. Every effort was made to ensure accuracy, however, inadvertent computerized transcription errors may be present.
BACTERIA 1+ 01/02/2023 04:26 PM    RBCUA None seen 01/02/2023 04:26 PM    BLOODU Negative 01/02/2023 04:26 PM    SPECGRAV <=1.005 01/02/2023 04:26 PM    GLUCOSEU Negative 01/02/2023 04:26 PM       Radiology:  FL ESOPHAGRAM   Final Result   Small hiatal hernia with moderate luminal narrowing proximal to the hernia   sac that temporarily impeded passage of barium tablet. Spontaneous gastroesophageal reflux to the level of the mid esophagus. Mild esophageal dysmotility. Duodenal diverticulum. MRI BRAIN W WO CONTRAST   Final Result   1. No acute intracranial abnormality. 2. Rule right-sided craniotomy with underlying chronic dural thickening. Schuyler Elam MD      Please excuse brevity and/or typos. This report was transcribed using voice recognition software. Every effort was made to ensure accuracy, however, inadvertent computerized transcription errors may be present.

## 2023-06-09 NOTE — PLAN OF CARE
Problem: Discharge Planning  Goal: Discharge to home or other facility with appropriate resources  6/9/2023 0206 by Edgardo Celis RN  Outcome: Progressing  Flowsheets (Taken 6/8/2023 2124)  Discharge to home or other facility with appropriate resources: Refer to discharge planning if patient needs post-hospital services based on physician order or complex needs related to functional status, cognitive ability or social support system  6/8/2023 1425 by Joanne Lindo RN  Outcome: Progressing  Flowsheets (Taken 6/8/2023 0745)  Discharge to home or other facility with appropriate resources:   Identify barriers to discharge with patient and caregiver   Arrange for needed discharge resources and transportation as appropriate   Identify discharge learning needs (meds, wound care, etc)   Refer to discharge planning if patient needs post-hospital services based on physician order or complex needs related to functional status, cognitive ability or social support system     Problem: Pain  Goal: Verbalizes/displays adequate comfort level or baseline comfort level  6/9/2023 0206 by Edgardo Celis RN  Outcome: Progressing  6/8/2023 1425 by Joanne Lindo RN  Outcome: Progressing     Problem: Safety - Adult  Goal: Free from fall injury  6/9/2023 0206 by Edgardo Celis RN  Outcome: Progressing  6/8/2023 1425 by Joanne Lindo RN  Outcome: Progressing     Problem: ABCDS Injury Assessment  Goal: Absence of physical injury  6/9/2023 0206 by Edgardo Celis RN  Outcome: Progressing  6/8/2023 1425 by Joanne Lindo RN  Outcome: Progressing

## 2023-06-09 NOTE — DISCHARGE INSTR - COC
Yes  Urinary Catheter: None   Colostomy/Ileostomy/Ileal Conduit: No       Date of Last BM: 6/9/2023    Intake/Output Summary (Last 24 hours) at 6/9/2023 1143  Last data filed at 6/9/2023 1125  Gross per 24 hour   Intake 32480.18 ml   Output --   Net 00590.18 ml     No intake/output data recorded. Safety Concerns:     None    Impairments/Disabilities:      None    Nutrition Therapy:  Current Nutrition Therapy:   - Oral Diet:  General    Routes of Feeding: Oral  Liquids: Thin Liquids  Daily Fluid Restriction: no  Last Modified Barium Swallow with Video (Video Swallowing Test): not done      Patient's personal belongings (please select all that are sent with patient):  Patient discharged from unit with all personal belongings     RN SIGNATURE:  Electronically signed by Patience Pendleton RN on 6/9/23 at 11:44 AM EDT    CASE MANAGEMENT/SOCIAL WORK SECTION    Inpatient Status Date: ***    Readmission Risk Assessment Score:  Readmission Risk              Risk of Unplanned Readmission:  15           Discharging to Facility/ Agency   Name:   Address:  Phone:  Fax:    Dialysis Facility (if applicable)   Name:  Address:  Dialysis Schedule:  Phone:  Fax:    / signature: {Esignature:[id number]}    PHYSICIAN SECTION    Prognosis: {Prognosis:3154728397}    Condition at Discharge: [facility] Patient Condition:867420293}    Rehab Potential (if transferring to Rehab): {Prognosis:7660728986}    Recommended Labs or Other Treatments After Discharge: ***    Physician Certification: I certify the above information and transfer of [facility]  is necessary for the continuing treatment of the diagnosis listed and that he requires {Admit to Appropriate Level of Care:80008} for {GREATER/LESS:687799588} 30 days.      Update Admission H&P: {CHP DME Changes in UYHTY:387151913}    PHYSICIAN SIGNATURE:  {Esignature:[id number]}

## 2023-06-09 NOTE — PLAN OF CARE
Problem: Discharge Planning  Goal: Discharge to home or other facility with appropriate resources  6/9/2023 1135 by Cynthia Lee RN  Outcome: Completed  6/9/2023 0206 by Hong Sanford RN  Outcome: Progressing     Problem: Pain  Goal: Verbalizes/displays adequate comfort level or baseline comfort level  6/9/2023 1135 by Cynthia Lee RN  Outcome: Completed  6/9/2023 0206 by Hong Sanford RN  Outcome: Progressing     Problem: Safety - Adult  Goal: Free from fall injury  6/9/2023 1135 by Cynthia Lee RN  Outcome: Completed  6/9/2023 0206 by Hong Sanford RN  Outcome: Progressing     Problem: ABCDS Injury Assessment  Goal: Absence of physical injury  6/9/2023 1135 by Cynthia Lee RN  Outcome: Completed  6/9/2023 0206 by Hong Sanford RN  Outcome: Progressing    Pt aware off plan to be discharged today. Pt voiced [facility] is too far for him to drive for care. This RN encouraged pt discuss any needed follow up at his Hospital follow Up appt. Pt voiced understanding.

## 2024-02-22 ENCOUNTER — HOSPITAL ENCOUNTER (INPATIENT)
Age: 54
LOS: 12 days | Discharge: HOME | DRG: 720 | End: 2024-03-05
Attending: EMERGENCY MEDICINE | Admitting: HOSPITALIST
Payer: COMMERCIAL

## 2024-02-22 ENCOUNTER — APPOINTMENT (OUTPATIENT)
Dept: CT IMAGING | Age: 54
DRG: 720 | End: 2024-02-22
Payer: COMMERCIAL

## 2024-02-22 ENCOUNTER — APPOINTMENT (OUTPATIENT)
Dept: GENERAL RADIOLOGY | Age: 54
DRG: 720 | End: 2024-02-22
Payer: COMMERCIAL

## 2024-02-22 DIAGNOSIS — J18.9 PNEUMONIA DUE TO INFECTIOUS ORGANISM, UNSPECIFIED LATERALITY, UNSPECIFIED PART OF LUNG: ICD-10-CM

## 2024-02-22 DIAGNOSIS — E87.1 HYPONATREMIA: ICD-10-CM

## 2024-02-22 DIAGNOSIS — D69.6 THROMBOCYTOPENIA (HCC): ICD-10-CM

## 2024-02-22 DIAGNOSIS — E83.42 HYPOMAGNESEMIA: ICD-10-CM

## 2024-02-22 DIAGNOSIS — R41.82 ALTERED MENTAL STATUS, UNSPECIFIED ALTERED MENTAL STATUS TYPE: Primary | ICD-10-CM

## 2024-02-22 PROBLEM — A41.9 SEPSIS (HCC): Status: ACTIVE | Noted: 2024-02-22

## 2024-02-22 LAB
ALBUMIN SERPL-MCNC: 4.3 G/DL (ref 3.4–5)
ALBUMIN/GLOB SERPL: 1.3 {RATIO} (ref 1.1–2.2)
ALP SERPL-CCNC: 65 U/L (ref 40–129)
ALT SERPL-CCNC: 24 U/L (ref 10–40)
AMPHETAMINES UR QL SCN>1000 NG/ML: NORMAL
ANION GAP SERPL CALCULATED.3IONS-SCNC: 21 MMOL/L (ref 3–16)
AST SERPL-CCNC: 66 U/L (ref 15–37)
BACTERIA URNS QL MICRO: NORMAL /HPF
BARBITURATES UR QL SCN>200 NG/ML: NORMAL
BASE EXCESS BLDV CALC-SCNC: -3.7 MMOL/L
BASOPHILS # BLD: 0 K/UL (ref 0–0.2)
BASOPHILS NFR BLD: 0.8 %
BENZODIAZ UR QL SCN>200 NG/ML: NORMAL
BILIRUB SERPL-MCNC: 0.5 MG/DL (ref 0–1)
BILIRUB UR QL STRIP.AUTO: NEGATIVE
BUN SERPL-MCNC: 5 MG/DL (ref 7–20)
CALCIUM SERPL-MCNC: 8.4 MG/DL (ref 8.3–10.6)
CANNABINOIDS UR QL SCN>50 NG/ML: NORMAL
CHLORIDE SERPL-SCNC: 68 MMOL/L (ref 99–110)
CLARITY UR: CLEAR
CO2 BLDV-SCNC: 23 MMOL/L
CO2 SERPL-SCNC: 16 MMOL/L (ref 21–32)
COCAINE UR QL SCN: NORMAL
COHGB MFR BLDV: 1.3 %
COLOR UR: YELLOW
CREAT SERPL-MCNC: 0.8 MG/DL (ref 0.9–1.3)
DEPRECATED RDW RBC AUTO: 16.5 % (ref 12.4–15.4)
DRUG SCREEN COMMENT UR-IMP: NORMAL
EOSINOPHIL # BLD: 0 K/UL (ref 0–0.6)
EOSINOPHIL NFR BLD: 0.3 %
EPI CELLS #/AREA URNS AUTO: 1 /HPF (ref 0–5)
ETHANOLAMINE SERPL-MCNC: NORMAL MG/DL (ref 0–0.08)
FENTANYL SCREEN, URINE: NORMAL
FLUAV RNA UPPER RESP QL NAA+PROBE: NEGATIVE
FLUBV AG NPH QL: NEGATIVE
GFR SERPLBLD CREATININE-BSD FMLA CKD-EPI: >60 ML/MIN/{1.73_M2}
GLUCOSE SERPL-MCNC: 81 MG/DL (ref 70–99)
GLUCOSE UR STRIP.AUTO-MCNC: NEGATIVE MG/DL
HCO3 BLDV-SCNC: 22 MMOL/L (ref 23–29)
HCT VFR BLD AUTO: 34.2 % (ref 40.5–52.5)
HGB BLD-MCNC: 12.1 G/DL (ref 13.5–17.5)
HGB UR QL STRIP.AUTO: ABNORMAL
HYALINE CASTS #/AREA URNS AUTO: 3 /LPF (ref 0–8)
KETONES UR STRIP.AUTO-MCNC: 15 MG/DL
LEUKOCYTE ESTERASE UR QL STRIP.AUTO: NEGATIVE
LYMPHOCYTES # BLD: 0.5 K/UL (ref 1–5.1)
LYMPHOCYTES NFR BLD: 10.3 %
MAGNESIUM SERPL-MCNC: 1.7 MG/DL (ref 1.8–2.4)
MCH RBC QN AUTO: 27.9 PG (ref 26–34)
MCHC RBC AUTO-ENTMCNC: 35.3 G/DL (ref 31–36)
MCV RBC AUTO: 79.1 FL (ref 80–100)
METHADONE UR QL SCN>300 NG/ML: NORMAL
METHGB MFR BLDV: 0.4 %
MONOCYTES # BLD: 0.3 K/UL (ref 0–1.3)
MONOCYTES NFR BLD: 7.1 %
NEUTROPHILS # BLD: 3.9 K/UL (ref 1.7–7.7)
NEUTROPHILS NFR BLD: 81.5 %
NITRITE UR QL STRIP.AUTO: NEGATIVE
O2 THERAPY: ABNORMAL
OPIATES UR QL SCN>300 NG/ML: NORMAL
OSMOLALITY SERPL: 233 MOSM/KG (ref 275–295)
OSMOLALITY UR: 432 MOSM/KG (ref 390–1070)
OXYCODONE UR QL SCN: NORMAL
PCO2 BLDV: 42 MMHG (ref 40–50)
PCP UR QL SCN>25 NG/ML: NORMAL
PH BLDV: 7.33 [PH] (ref 7.35–7.45)
PH UR STRIP.AUTO: 5.5 [PH] (ref 5–8)
PH UR STRIP: 5 [PH]
PHOSPHATE SERPL-MCNC: 2.4 MG/DL (ref 2.5–4.9)
PLATELET # BLD AUTO: 47 K/UL (ref 135–450)
PLATELET BLD QL SMEAR: ABNORMAL
PMV BLD AUTO: 9.6 FL (ref 5–10.5)
PO2 BLDV: 44 MMHG
POTASSIUM SERPL-SCNC: 3.8 MMOL/L (ref 3.5–5.1)
PROCALCITONIN SERPL IA-MCNC: 0.25 NG/ML (ref 0–0.15)
PROT SERPL-MCNC: 7.7 G/DL (ref 6.4–8.2)
PROT UR STRIP.AUTO-MCNC: 30 MG/DL
RBC # BLD AUTO: 4.32 M/UL (ref 4.2–5.9)
RBC CLUMPS #/AREA URNS AUTO: 1 /HPF (ref 0–4)
RBC MORPH BLD: NORMAL
REASON FOR REJECTION: NORMAL
REJECTED TEST: NORMAL
SAO2 % BLDV: 74 %
SARS-COV-2 RDRP RESP QL NAA+PROBE: NOT DETECTED
SLIDE REVIEW: ABNORMAL
SODIUM SERPL-SCNC: 105 MMOL/L (ref 136–145)
SODIUM UR-SCNC: 74 MMOL/L
SP GR UR STRIP.AUTO: 1.01 (ref 1–1.03)
UA DIPSTICK W REFLEX MICRO PNL UR: YES
URN SPEC COLLECT METH UR: ABNORMAL
UROBILINOGEN UR STRIP-ACNC: 0.2 E.U./DL
UUN UR-MCNC: 369.3 MG/DL (ref 800–1666)
WBC # BLD AUTO: 4.8 K/UL (ref 4–11)
WBC #/AREA URNS AUTO: 1 /HPF (ref 0–5)

## 2024-02-22 PROCEDURE — 84540 ASSAY OF URINE/UREA-N: CPT

## 2024-02-22 PROCEDURE — 71045 X-RAY EXAM CHEST 1 VIEW: CPT

## 2024-02-22 PROCEDURE — 82803 BLOOD GASES ANY COMBINATION: CPT

## 2024-02-22 PROCEDURE — 83930 ASSAY OF BLOOD OSMOLALITY: CPT

## 2024-02-22 PROCEDURE — 87040 BLOOD CULTURE FOR BACTERIA: CPT

## 2024-02-22 PROCEDURE — 99285 EMERGENCY DEPT VISIT HI MDM: CPT

## 2024-02-22 PROCEDURE — 2580000003 HC RX 258: Performed by: EMERGENCY MEDICINE

## 2024-02-22 PROCEDURE — 87804 INFLUENZA ASSAY W/OPTIC: CPT

## 2024-02-22 PROCEDURE — 2000000000 HC ICU R&B

## 2024-02-22 PROCEDURE — 87150 DNA/RNA AMPLIFIED PROBE: CPT

## 2024-02-22 PROCEDURE — 6360000002 HC RX W HCPCS: Performed by: EMERGENCY MEDICINE

## 2024-02-22 PROCEDURE — 87635 SARS-COV-2 COVID-19 AMP PRB: CPT

## 2024-02-22 PROCEDURE — 87077 CULTURE AEROBIC IDENTIFY: CPT

## 2024-02-22 PROCEDURE — 83735 ASSAY OF MAGNESIUM: CPT

## 2024-02-22 PROCEDURE — 84550 ASSAY OF BLOOD/URIC ACID: CPT

## 2024-02-22 PROCEDURE — 82077 ASSAY SPEC XCP UR&BREATH IA: CPT

## 2024-02-22 PROCEDURE — 82533 TOTAL CORTISOL: CPT

## 2024-02-22 PROCEDURE — 84100 ASSAY OF PHOSPHORUS: CPT

## 2024-02-22 PROCEDURE — 81001 URINALYSIS AUTO W/SCOPE: CPT

## 2024-02-22 PROCEDURE — 70450 CT HEAD/BRAIN W/O DYE: CPT

## 2024-02-22 PROCEDURE — 80307 DRUG TEST PRSMV CHEM ANLYZR: CPT

## 2024-02-22 PROCEDURE — 80053 COMPREHEN METABOLIC PANEL: CPT

## 2024-02-22 PROCEDURE — 84300 ASSAY OF URINE SODIUM: CPT

## 2024-02-22 PROCEDURE — 36415 COLL VENOUS BLD VENIPUNCTURE: CPT

## 2024-02-22 PROCEDURE — 93005 ELECTROCARDIOGRAM TRACING: CPT | Performed by: EMERGENCY MEDICINE

## 2024-02-22 PROCEDURE — 87186 SC STD MICRODIL/AGAR DIL: CPT

## 2024-02-22 PROCEDURE — 83935 ASSAY OF URINE OSMOLALITY: CPT

## 2024-02-22 PROCEDURE — 85025 COMPLETE CBC W/AUTO DIFF WBC: CPT

## 2024-02-22 PROCEDURE — 84145 PROCALCITONIN (PCT): CPT

## 2024-02-22 RX ORDER — 0.9 % SODIUM CHLORIDE 0.9 %
1000 INTRAVENOUS SOLUTION INTRAVENOUS ONCE
Status: DISCONTINUED | OUTPATIENT
Start: 2024-02-22 | End: 2024-02-22

## 2024-02-22 RX ORDER — 3% SODIUM CHLORIDE 3 G/100ML
30 INJECTION, SOLUTION INTRAVENOUS CONTINUOUS
Status: DISCONTINUED | OUTPATIENT
Start: 2024-02-23 | End: 2024-02-23

## 2024-02-22 RX ORDER — 3% SODIUM CHLORIDE 3 G/100ML
100 INJECTION, SOLUTION INTRAVENOUS ONCE
Status: COMPLETED | OUTPATIENT
Start: 2024-02-22 | End: 2024-02-22

## 2024-02-22 RX ORDER — MAGNESIUM SULFATE 1 G/100ML
1000 INJECTION INTRAVENOUS ONCE
Status: COMPLETED | OUTPATIENT
Start: 2024-02-22 | End: 2024-02-23

## 2024-02-22 RX ADMIN — MAGNESIUM SULFATE HEPTAHYDRATE 1000 MG: 1 INJECTION, SOLUTION INTRAVENOUS at 23:07

## 2024-02-22 RX ADMIN — SODIUM CHLORIDE 100 ML: 3 INJECTION, SOLUTION INTRAVENOUS at 21:46

## 2024-02-22 RX ADMIN — CEFEPIME 2000 MG: 2 INJECTION, POWDER, FOR SOLUTION INTRAVENOUS at 23:49

## 2024-02-22 ASSESSMENT — PAIN - FUNCTIONAL ASSESSMENT: PAIN_FUNCTIONAL_ASSESSMENT: NONE - DENIES PAIN

## 2024-02-23 LAB
ANION GAP SERPL CALCULATED.3IONS-SCNC: 11 MMOL/L (ref 3–16)
ANION GAP SERPL CALCULATED.3IONS-SCNC: 12 MMOL/L (ref 3–16)
ANION GAP SERPL CALCULATED.3IONS-SCNC: 13 MMOL/L (ref 3–16)
ANION GAP SERPL CALCULATED.3IONS-SCNC: 14 MMOL/L (ref 3–16)
ANION GAP SERPL CALCULATED.3IONS-SCNC: 14 MMOL/L (ref 3–16)
ANION GAP SERPL CALCULATED.3IONS-SCNC: 9 MMOL/L (ref 3–16)
BASOPHILS # BLD: 0 K/UL (ref 0–0.2)
BASOPHILS NFR BLD: 0.2 %
BUN SERPL-MCNC: 10 MG/DL (ref 7–20)
BUN SERPL-MCNC: 10 MG/DL (ref 7–20)
BUN SERPL-MCNC: 12 MG/DL (ref 7–20)
BUN SERPL-MCNC: 8 MG/DL (ref 7–20)
BUN SERPL-MCNC: 9 MG/DL (ref 7–20)
BUN SERPL-MCNC: 9 MG/DL (ref 7–20)
C DIFF TOX A+B STL QL IA: NORMAL
CALCIUM SERPL-MCNC: 7.8 MG/DL (ref 8.3–10.6)
CALCIUM SERPL-MCNC: 8.2 MG/DL (ref 8.3–10.6)
CALCIUM SERPL-MCNC: 8.3 MG/DL (ref 8.3–10.6)
CALCIUM SERPL-MCNC: 8.4 MG/DL (ref 8.3–10.6)
CALCIUM SERPL-MCNC: 8.4 MG/DL (ref 8.3–10.6)
CALCIUM SERPL-MCNC: 8.7 MG/DL (ref 8.3–10.6)
CHLORIDE SERPL-SCNC: 71 MMOL/L (ref 99–110)
CHLORIDE SERPL-SCNC: 73 MMOL/L (ref 99–110)
CHLORIDE SERPL-SCNC: 83 MMOL/L (ref 99–110)
CHLORIDE SERPL-SCNC: 86 MMOL/L (ref 99–110)
CO2 SERPL-SCNC: 19 MMOL/L (ref 21–32)
CO2 SERPL-SCNC: 20 MMOL/L (ref 21–32)
CO2 SERPL-SCNC: 21 MMOL/L (ref 21–32)
CO2 SERPL-SCNC: 22 MMOL/L (ref 21–32)
CORTIS AM PEAK SERPL-MCNC: 12.3 UG/DL (ref 4.3–22.4)
CORTIS AM PEAK SERPL-MCNC: 22.9 UG/DL (ref 4.3–22.4)
CORTIS PM SERPL-MCNC: 13.9 UG/DL (ref 3.1–16.7)
CREAT SERPL-MCNC: 0.7 MG/DL (ref 0.9–1.3)
CREAT SERPL-MCNC: 0.7 MG/DL (ref 0.9–1.3)
CREAT SERPL-MCNC: 0.8 MG/DL (ref 0.9–1.3)
CREAT SERPL-MCNC: 0.9 MG/DL (ref 0.9–1.3)
DEPRECATED RDW RBC AUTO: 16 % (ref 12.4–15.4)
EKG ATRIAL RATE: 95 BPM
EKG ATRIAL RATE: 97 BPM
EKG DIAGNOSIS: NORMAL
EKG DIAGNOSIS: NORMAL
EKG P AXIS: 53 DEGREES
EKG P AXIS: 73 DEGREES
EKG P-R INTERVAL: 162 MS
EKG P-R INTERVAL: 172 MS
EKG Q-T INTERVAL: 366 MS
EKG Q-T INTERVAL: 380 MS
EKG QRS DURATION: 94 MS
EKG QRS DURATION: 96 MS
EKG QTC CALCULATION (BAZETT): 464 MS
EKG QTC CALCULATION (BAZETT): 477 MS
EKG R AXIS: 68 DEGREES
EKG R AXIS: 68 DEGREES
EKG T AXIS: 65 DEGREES
EKG T AXIS: 79 DEGREES
EKG VENTRICULAR RATE: 95 BPM
EKG VENTRICULAR RATE: 97 BPM
EOSINOPHIL # BLD: 0 K/UL (ref 0–0.6)
EOSINOPHIL NFR BLD: 0 %
GFR SERPLBLD CREATININE-BSD FMLA CKD-EPI: >60 ML/MIN/{1.73_M2}
GLUCOSE BLD-MCNC: 123 MG/DL (ref 70–99)
GLUCOSE BLD-MCNC: 318 MG/DL (ref 70–99)
GLUCOSE SERPL-MCNC: 120 MG/DL (ref 70–99)
GLUCOSE SERPL-MCNC: 86 MG/DL (ref 70–99)
GLUCOSE SERPL-MCNC: 92 MG/DL (ref 70–99)
GLUCOSE SERPL-MCNC: 96 MG/DL (ref 70–99)
GLUCOSE SERPL-MCNC: 96 MG/DL (ref 70–99)
GLUCOSE SERPL-MCNC: 98 MG/DL (ref 70–99)
HCT VFR BLD AUTO: 32.5 % (ref 40.5–52.5)
HCT VFR BLD AUTO: 33.1 % (ref 40.5–52.5)
HCT VFR BLD AUTO: 33.8 % (ref 40.5–52.5)
HEMOCCULT STL QL: ABNORMAL
HGB BLD-MCNC: 11.7 G/DL (ref 13.5–17.5)
HGB BLD-MCNC: 11.7 G/DL (ref 13.5–17.5)
HGB BLD-MCNC: 11.8 G/DL (ref 13.5–17.5)
LEGIONELLA AG UR QL: NORMAL
LYMPHOCYTES # BLD: 0.6 K/UL (ref 1–5.1)
LYMPHOCYTES NFR BLD: 10.4 %
MAGNESIUM SERPL-MCNC: 1.9 MG/DL (ref 1.8–2.4)
MAGNESIUM SERPL-MCNC: 2.1 MG/DL (ref 1.8–2.4)
MAGNESIUM SERPL-MCNC: 2.2 MG/DL (ref 1.8–2.4)
MAGNESIUM SERPL-MCNC: 2.8 MG/DL (ref 1.8–2.4)
MCH RBC QN AUTO: 27.8 PG (ref 26–34)
MCHC RBC AUTO-ENTMCNC: 36 G/DL (ref 31–36)
MCV RBC AUTO: 77.2 FL (ref 80–100)
MONOCYTES # BLD: 0.3 K/UL (ref 0–1.3)
MONOCYTES NFR BLD: 5.4 %
MRSA DNA SPEC QL NAA+PROBE: NORMAL
NEUTROPHILS # BLD: 4.6 K/UL (ref 1.7–7.7)
NEUTROPHILS NFR BLD: 84 %
OSMOLALITY UR: 429 MOSM/KG (ref 390–1070)
PERFORMED ON: ABNORMAL
PERFORMED ON: ABNORMAL
PLATELET # BLD AUTO: 154 K/UL (ref 135–450)
PLATELET BLD QL SMEAR: ADEQUATE
PMV BLD AUTO: 8.9 FL (ref 5–10.5)
POTASSIUM SERPL-SCNC: 2.8 MMOL/L (ref 3.5–5.1)
POTASSIUM SERPL-SCNC: 2.9 MMOL/L (ref 3.5–5.1)
POTASSIUM SERPL-SCNC: 3.4 MMOL/L (ref 3.5–5.1)
POTASSIUM SERPL-SCNC: 3.4 MMOL/L (ref 3.5–5.1)
POTASSIUM SERPL-SCNC: 3.7 MMOL/L (ref 3.5–5.1)
POTASSIUM SERPL-SCNC: 4.2 MMOL/L (ref 3.5–5.1)
PROT UR-MCNC: 0.01 G/DL
PROT UR-MCNC: 9 MG/DL
RBC # BLD AUTO: 4.21 M/UL (ref 4.2–5.9)
REASON FOR REJECTION: NORMAL
REJECTED TEST: NORMAL
REPORT: NORMAL
S PNEUM AG UR QL: NORMAL
SLIDE REVIEW: ABNORMAL
SODIUM SERPL-SCNC: 106 MMOL/L (ref 136–145)
SODIUM SERPL-SCNC: 106 MMOL/L (ref 136–145)
SODIUM SERPL-SCNC: 115 MMOL/L (ref 136–145)
SODIUM SERPL-SCNC: 116 MMOL/L (ref 136–145)
SODIUM SERPL-SCNC: 118 MMOL/L (ref 136–145)
SODIUM SERPL-SCNC: 118 MMOL/L (ref 136–145)
SODIUM SERPL-SCNC: 119 MMOL/L (ref 136–145)
SODIUM SERPL-SCNC: 120 MMOL/L (ref 136–145)
SODIUM UR-SCNC: 28 MMOL/L
TSH SERPL DL<=0.005 MIU/L-ACNC: 0.35 UIU/ML (ref 0.27–4.2)
URATE SERPL-MCNC: 4.8 MG/DL (ref 3.5–7.2)
URATE SERPL-MCNC: 5.9 MG/DL (ref 3.5–7.2)
WBC # BLD AUTO: 5.4 K/UL (ref 4–11)

## 2024-02-23 PROCEDURE — 84166 PROTEIN E-PHORESIS/URINE/CSF: CPT

## 2024-02-23 PROCEDURE — C9113 INJ PANTOPRAZOLE SODIUM, VIA: HCPCS | Performed by: INTERNAL MEDICINE

## 2024-02-23 PROCEDURE — 2580000003 HC RX 258: Performed by: REGISTERED NURSE

## 2024-02-23 PROCEDURE — 2580000003 HC RX 258: Performed by: INTERNAL MEDICINE

## 2024-02-23 PROCEDURE — 84156 ASSAY OF PROTEIN URINE: CPT

## 2024-02-23 PROCEDURE — 87324 CLOSTRIDIUM AG IA: CPT

## 2024-02-23 PROCEDURE — 51702 INSERT TEMP BLADDER CATH: CPT

## 2024-02-23 PROCEDURE — 85014 HEMATOCRIT: CPT

## 2024-02-23 PROCEDURE — 6360000002 HC RX W HCPCS

## 2024-02-23 PROCEDURE — 82270 OCCULT BLOOD FECES: CPT

## 2024-02-23 PROCEDURE — 84295 ASSAY OF SERUM SODIUM: CPT

## 2024-02-23 PROCEDURE — 6370000000 HC RX 637 (ALT 250 FOR IP): Performed by: HOSPITALIST

## 2024-02-23 PROCEDURE — 85025 COMPLETE CBC W/AUTO DIFF WBC: CPT

## 2024-02-23 PROCEDURE — 84439 ASSAY OF FREE THYROXINE: CPT

## 2024-02-23 PROCEDURE — 85018 HEMOGLOBIN: CPT

## 2024-02-23 PROCEDURE — 6370000000 HC RX 637 (ALT 250 FOR IP): Performed by: STUDENT IN AN ORGANIZED HEALTH CARE EDUCATION/TRAINING PROGRAM

## 2024-02-23 PROCEDURE — A4216 STERILE WATER/SALINE, 10 ML: HCPCS | Performed by: INTERNAL MEDICINE

## 2024-02-23 PROCEDURE — C1769 GUIDE WIRE: HCPCS

## 2024-02-23 PROCEDURE — 6360000002 HC RX W HCPCS: Performed by: INTERNAL MEDICINE

## 2024-02-23 PROCEDURE — 2000000000 HC ICU R&B

## 2024-02-23 PROCEDURE — 36415 COLL VENOUS BLD VENIPUNCTURE: CPT

## 2024-02-23 PROCEDURE — 80048 BASIC METABOLIC PNL TOTAL CA: CPT

## 2024-02-23 PROCEDURE — 2500000003 HC RX 250 WO HCPCS: Performed by: REGISTERED NURSE

## 2024-02-23 PROCEDURE — 93010 ELECTROCARDIOGRAM REPORT: CPT | Performed by: INTERNAL MEDICINE

## 2024-02-23 PROCEDURE — 83735 ASSAY OF MAGNESIUM: CPT

## 2024-02-23 PROCEDURE — 84443 ASSAY THYROID STIM HORMONE: CPT

## 2024-02-23 PROCEDURE — 83935 ASSAY OF URINE OSMOLALITY: CPT

## 2024-02-23 PROCEDURE — 36592 COLLECT BLOOD FROM PICC: CPT

## 2024-02-23 PROCEDURE — 87449 NOS EACH ORGANISM AG IA: CPT

## 2024-02-23 PROCEDURE — 2700000000 HC OXYGEN THERAPY PER DAY

## 2024-02-23 PROCEDURE — 2580000003 HC RX 258: Performed by: HOSPITALIST

## 2024-02-23 PROCEDURE — 6360000002 HC RX W HCPCS: Performed by: REGISTERED NURSE

## 2024-02-23 PROCEDURE — 2500000003 HC RX 250 WO HCPCS: Performed by: HOSPITALIST

## 2024-02-23 PROCEDURE — 6360000002 HC RX W HCPCS: Performed by: HOSPITALIST

## 2024-02-23 PROCEDURE — 82533 TOTAL CORTISOL: CPT

## 2024-02-23 PROCEDURE — 94760 N-INVAS EAR/PLS OXIMETRY 1: CPT

## 2024-02-23 PROCEDURE — 87641 MR-STAPH DNA AMP PROBE: CPT

## 2024-02-23 PROCEDURE — 6370000000 HC RX 637 (ALT 250 FOR IP): Performed by: REGISTERED NURSE

## 2024-02-23 PROCEDURE — 36569 INSJ PICC 5 YR+ W/O IMAGING: CPT

## 2024-02-23 PROCEDURE — 84550 ASSAY OF BLOOD/URIC ACID: CPT

## 2024-02-23 PROCEDURE — 02HV33Z INSERTION OF INFUSION DEVICE INTO SUPERIOR VENA CAVA, PERCUTANEOUS APPROACH: ICD-10-PCS | Performed by: HOSPITALIST

## 2024-02-23 PROCEDURE — 84300 ASSAY OF URINE SODIUM: CPT

## 2024-02-23 RX ORDER — AMLODIPINE BESYLATE 10 MG/1
10 TABLET ORAL DAILY
Status: ON HOLD | COMMUNITY
End: 2024-03-04 | Stop reason: HOSPADM

## 2024-02-23 RX ORDER — LIDOCAINE HYDROCHLORIDE 10 MG/ML
5 INJECTION, SOLUTION EPIDURAL; INFILTRATION; INTRACAUDAL; PERINEURAL ONCE
Status: COMPLETED | OUTPATIENT
Start: 2024-02-23 | End: 2024-02-23

## 2024-02-23 RX ORDER — POTASSIUM CHLORIDE 29.8 MG/ML
20 INJECTION INTRAVENOUS PRN
Status: DISCONTINUED | OUTPATIENT
Start: 2024-02-23 | End: 2024-02-23

## 2024-02-23 RX ORDER — LANOLIN ALCOHOL/MO/W.PET/CERES
6 CREAM (GRAM) TOPICAL NIGHTLY PRN
Status: DISCONTINUED | OUTPATIENT
Start: 2024-02-23 | End: 2024-03-05 | Stop reason: HOSPADM

## 2024-02-23 RX ORDER — POTASSIUM CHLORIDE 29.8 MG/ML
20 INJECTION INTRAVENOUS PRN
Status: DISCONTINUED | OUTPATIENT
Start: 2024-02-23 | End: 2024-03-05 | Stop reason: HOSPADM

## 2024-02-23 RX ORDER — POTASSIUM CHLORIDE 7.45 MG/ML
10 INJECTION INTRAVENOUS PRN
Status: DISCONTINUED | OUTPATIENT
Start: 2024-02-23 | End: 2024-03-05 | Stop reason: HOSPADM

## 2024-02-23 RX ORDER — ZOLPIDEM TARTRATE 5 MG/1
10 TABLET ORAL NIGHTLY PRN
Status: DISCONTINUED | OUTPATIENT
Start: 2024-02-23 | End: 2024-03-05 | Stop reason: HOSPADM

## 2024-02-23 RX ORDER — MAGNESIUM SULFATE IN WATER 40 MG/ML
2000 INJECTION, SOLUTION INTRAVENOUS PRN
Status: DISCONTINUED | OUTPATIENT
Start: 2024-02-23 | End: 2024-03-05 | Stop reason: HOSPADM

## 2024-02-23 RX ORDER — POTASSIUM CHLORIDE 7.45 MG/ML
10 INJECTION INTRAVENOUS
Status: DISCONTINUED | OUTPATIENT
Start: 2024-02-23 | End: 2024-02-23 | Stop reason: ALTCHOICE

## 2024-02-23 RX ORDER — DESMOPRESSIN ACETATE 4 UG/ML
1 INJECTION, SOLUTION INTRAVENOUS; SUBCUTANEOUS ONCE
Status: COMPLETED | OUTPATIENT
Start: 2024-02-23 | End: 2024-02-23

## 2024-02-23 RX ORDER — ENOXAPARIN SODIUM 100 MG/ML
40 INJECTION SUBCUTANEOUS DAILY
Status: DISCONTINUED | OUTPATIENT
Start: 2024-02-23 | End: 2024-02-23

## 2024-02-23 RX ORDER — ONDANSETRON 2 MG/ML
4 INJECTION INTRAMUSCULAR; INTRAVENOUS EVERY 6 HOURS PRN
Status: DISCONTINUED | OUTPATIENT
Start: 2024-02-23 | End: 2024-03-05 | Stop reason: HOSPADM

## 2024-02-23 RX ORDER — SODIUM CHLORIDE 0.9 % (FLUSH) 0.9 %
5-40 SYRINGE (ML) INJECTION PRN
Status: DISCONTINUED | OUTPATIENT
Start: 2024-02-23 | End: 2024-03-05 | Stop reason: HOSPADM

## 2024-02-23 RX ORDER — SODIUM CHLORIDE 9 MG/ML
INJECTION, SOLUTION INTRAVENOUS PRN
Status: DISCONTINUED | OUTPATIENT
Start: 2024-02-23 | End: 2024-03-05 | Stop reason: HOSPADM

## 2024-02-23 RX ORDER — ACETAMINOPHEN 325 MG/1
650 TABLET ORAL EVERY 6 HOURS PRN
Status: DISCONTINUED | OUTPATIENT
Start: 2024-02-23 | End: 2024-03-05 | Stop reason: HOSPADM

## 2024-02-23 RX ORDER — ACETAMINOPHEN 650 MG/1
650 SUPPOSITORY RECTAL EVERY 6 HOURS PRN
Status: DISCONTINUED | OUTPATIENT
Start: 2024-02-23 | End: 2024-03-05 | Stop reason: HOSPADM

## 2024-02-23 RX ORDER — SUCRALFATE ORAL 1 G/10ML
1 SUSPENSION ORAL
COMMUNITY

## 2024-02-23 RX ORDER — POTASSIUM CHLORIDE 20 MEQ/1
40 TABLET, EXTENDED RELEASE ORAL
Status: DISCONTINUED | OUTPATIENT
Start: 2024-02-23 | End: 2024-02-23 | Stop reason: ALTCHOICE

## 2024-02-23 RX ORDER — SODIUM CHLORIDE 0.9 % (FLUSH) 0.9 %
5-40 SYRINGE (ML) INJECTION PRN
Status: DISCONTINUED | OUTPATIENT
Start: 2024-02-23 | End: 2024-02-29 | Stop reason: SDUPTHER

## 2024-02-23 RX ORDER — CHLORPROMAZINE HYDROCHLORIDE 10 MG/1
10 TABLET, FILM COATED ORAL 4 TIMES DAILY PRN
COMMUNITY

## 2024-02-23 RX ORDER — SODIUM CHLORIDE 0.9 % (FLUSH) 0.9 %
5-40 SYRINGE (ML) INJECTION EVERY 12 HOURS SCHEDULED
Status: DISCONTINUED | OUTPATIENT
Start: 2024-02-23 | End: 2024-02-29 | Stop reason: SDUPTHER

## 2024-02-23 RX ORDER — VALSARTAN 160 MG/1
320 TABLET ORAL DAILY
Status: DISCONTINUED | OUTPATIENT
Start: 2024-02-23 | End: 2024-03-05 | Stop reason: HOSPADM

## 2024-02-23 RX ORDER — SODIUM CHLORIDE 9 MG/ML
25 INJECTION, SOLUTION INTRAVENOUS PRN
Status: DISCONTINUED | OUTPATIENT
Start: 2024-02-23 | End: 2024-03-05 | Stop reason: HOSPADM

## 2024-02-23 RX ORDER — AMLODIPINE BESYLATE 10 MG/1
10 TABLET ORAL DAILY
Status: DISCONTINUED | OUTPATIENT
Start: 2024-02-23 | End: 2024-03-05 | Stop reason: HOSPADM

## 2024-02-23 RX ORDER — DEXTROSE MONOHYDRATE 50 MG/ML
INJECTION, SOLUTION INTRAVENOUS CONTINUOUS
Status: DISCONTINUED | OUTPATIENT
Start: 2024-02-23 | End: 2024-02-24

## 2024-02-23 RX ORDER — ZOLPIDEM TARTRATE 10 MG/1
10 TABLET ORAL NIGHTLY PRN
Status: ON HOLD | COMMUNITY
End: 2024-03-04 | Stop reason: HOSPADM

## 2024-02-23 RX ORDER — POLYETHYLENE GLYCOL 3350 17 G/17G
17 POWDER, FOR SOLUTION ORAL DAILY PRN
Status: DISCONTINUED | OUTPATIENT
Start: 2024-02-23 | End: 2024-03-05 | Stop reason: HOSPADM

## 2024-02-23 RX ORDER — ONDANSETRON 4 MG/1
4 TABLET, ORALLY DISINTEGRATING ORAL EVERY 8 HOURS PRN
Status: DISCONTINUED | OUTPATIENT
Start: 2024-02-23 | End: 2024-03-05 | Stop reason: HOSPADM

## 2024-02-23 RX ORDER — METOCLOPRAMIDE 10 MG/1
10 TABLET ORAL 4 TIMES DAILY
COMMUNITY

## 2024-02-23 RX ORDER — POTASSIUM CHLORIDE 7.45 MG/ML
10 INJECTION INTRAVENOUS PRN
Status: DISCONTINUED | OUTPATIENT
Start: 2024-02-23 | End: 2024-02-23

## 2024-02-23 RX ORDER — PANTOPRAZOLE SODIUM 20 MG/1
20 TABLET, DELAYED RELEASE ORAL 2 TIMES DAILY
COMMUNITY

## 2024-02-23 RX ORDER — ALBUTEROL SULFATE 2.5 MG/3ML
2.5 SOLUTION RESPIRATORY (INHALATION)
Status: DISCONTINUED | OUTPATIENT
Start: 2024-02-23 | End: 2024-03-05 | Stop reason: HOSPADM

## 2024-02-23 RX ORDER — SODIUM CHLORIDE 0.9 % (FLUSH) 0.9 %
5-40 SYRINGE (ML) INJECTION EVERY 12 HOURS SCHEDULED
Status: DISCONTINUED | OUTPATIENT
Start: 2024-02-23 | End: 2024-03-05 | Stop reason: HOSPADM

## 2024-02-23 RX ADMIN — DOXYCYCLINE 100 MG: 100 INJECTION, POWDER, LYOPHILIZED, FOR SOLUTION INTRAVENOUS at 20:51

## 2024-02-23 RX ADMIN — POTASSIUM CHLORIDE 10 MEQ: 7.46 INJECTION, SOLUTION INTRAVENOUS at 15:05

## 2024-02-23 RX ADMIN — PANTOPRAZOLE SODIUM 8 MG/HR: 40 INJECTION, POWDER, FOR SOLUTION INTRAVENOUS at 07:35

## 2024-02-23 RX ADMIN — PANTOPRAZOLE SODIUM 8 MG/HR: 40 INJECTION, POWDER, FOR SOLUTION INTRAVENOUS at 15:06

## 2024-02-23 RX ADMIN — CEFEPIME 2000 MG: 2 INJECTION, POWDER, FOR SOLUTION INTRAVENOUS at 23:47

## 2024-02-23 RX ADMIN — ACETAMINOPHEN 650 MG: 650 SUPPOSITORY RECTAL at 01:24

## 2024-02-23 RX ADMIN — DOXYCYCLINE 100 MG: 100 INJECTION, POWDER, LYOPHILIZED, FOR SOLUTION INTRAVENOUS at 08:48

## 2024-02-23 RX ADMIN — VALSARTAN 320 MG: 160 TABLET ORAL at 13:32

## 2024-02-23 RX ADMIN — POTASSIUM CHLORIDE 20 MEQ: 29.8 INJECTION, SOLUTION INTRAVENOUS at 19:23

## 2024-02-23 RX ADMIN — POTASSIUM CHLORIDE 20 MEQ: 29.8 INJECTION, SOLUTION INTRAVENOUS at 07:30

## 2024-02-23 RX ADMIN — SODIUM CHLORIDE, PRESERVATIVE FREE 10 ML: 5 INJECTION INTRAVENOUS at 08:42

## 2024-02-23 RX ADMIN — SODIUM CHLORIDE, PRESERVATIVE FREE 10 ML: 5 INJECTION INTRAVENOUS at 20:18

## 2024-02-23 RX ADMIN — POTASSIUM CHLORIDE 20 MEQ: 29.8 INJECTION, SOLUTION INTRAVENOUS at 18:03

## 2024-02-23 RX ADMIN — CEFEPIME 2000 MG: 2 INJECTION, POWDER, FOR SOLUTION INTRAVENOUS at 18:00

## 2024-02-23 RX ADMIN — DEXTROSE MONOHYDRATE: 50 INJECTION, SOLUTION INTRAVENOUS at 09:19

## 2024-02-23 RX ADMIN — LIDOCAINE HYDROCHLORIDE 5 ML: 10 INJECTION, SOLUTION EPIDURAL; INFILTRATION; INTRACAUDAL; PERINEURAL at 05:21

## 2024-02-23 RX ADMIN — AMLODIPINE BESYLATE 10 MG: 10 TABLET ORAL at 13:32

## 2024-02-23 RX ADMIN — POTASSIUM CHLORIDE 20 MEQ: 29.8 INJECTION, SOLUTION INTRAVENOUS at 05:57

## 2024-02-23 RX ADMIN — PANTOPRAZOLE SODIUM 80 MG: 40 INJECTION, POWDER, FOR SOLUTION INTRAVENOUS at 07:28

## 2024-02-23 RX ADMIN — POTASSIUM CHLORIDE 10 MEQ: 7.46 INJECTION, SOLUTION INTRAVENOUS at 15:03

## 2024-02-23 RX ADMIN — SODIUM CHLORIDE: 9 INJECTION, SOLUTION INTRAVENOUS at 04:18

## 2024-02-23 RX ADMIN — Medication 6 MG: at 21:03

## 2024-02-23 RX ADMIN — SODIUM CHLORIDE 25 ML/HR: 3 INJECTION, SOLUTION INTRAVENOUS at 01:41

## 2024-02-23 RX ADMIN — POTASSIUM CHLORIDE 10 MEQ: 7.46 INJECTION, SOLUTION INTRAVENOUS at 01:15

## 2024-02-23 RX ADMIN — DEXTROSE MONOHYDRATE: 50 INJECTION, SOLUTION INTRAVENOUS at 23:38

## 2024-02-23 RX ADMIN — ACETAMINOPHEN 650 MG: 325 TABLET ORAL at 20:18

## 2024-02-23 RX ADMIN — DOXYCYCLINE 100 MG: 100 INJECTION, POWDER, LYOPHILIZED, FOR SOLUTION INTRAVENOUS at 01:23

## 2024-02-23 RX ADMIN — ACETAMINOPHEN 650 MG: 325 TABLET ORAL at 08:59

## 2024-02-23 RX ADMIN — SODIUM CHLORIDE: 9 INJECTION, SOLUTION INTRAVENOUS at 01:13

## 2024-02-23 RX ADMIN — POTASSIUM CHLORIDE 20 MEQ: 29.8 INJECTION, SOLUTION INTRAVENOUS at 04:20

## 2024-02-23 RX ADMIN — CEFEPIME 2000 MG: 2 INJECTION, POWDER, FOR SOLUTION INTRAVENOUS at 08:42

## 2024-02-23 RX ADMIN — DESMOPRESSIN ACETATE 1 MCG: 4 INJECTION, SOLUTION INTRAVENOUS; SUBCUTANEOUS at 16:26

## 2024-02-23 RX ADMIN — POTASSIUM CHLORIDE 10 MEQ: 7.46 INJECTION, SOLUTION INTRAVENOUS at 02:28

## 2024-02-23 RX ADMIN — SODIUM CHLORIDE 25 ML/HR: 3 INJECTION, SOLUTION INTRAVENOUS at 04:35

## 2024-02-23 ASSESSMENT — PAIN DESCRIPTION - PAIN TYPE: TYPE: CHRONIC PAIN

## 2024-02-23 ASSESSMENT — PAIN SCALES - GENERAL
PAINLEVEL_OUTOF10: 2
PAINLEVEL_OUTOF10: 2

## 2024-02-23 ASSESSMENT — PAIN DESCRIPTION - ORIENTATION: ORIENTATION: MID

## 2024-02-23 ASSESSMENT — PAIN DESCRIPTION - LOCATION: LOCATION: ABDOMEN

## 2024-02-23 ASSESSMENT — PAIN DESCRIPTION - DESCRIPTORS: DESCRIPTORS: ACHING

## 2024-02-23 ASSESSMENT — PAIN - FUNCTIONAL ASSESSMENT: PAIN_FUNCTIONAL_ASSESSMENT: ACTIVITIES ARE NOT PREVENTED

## 2024-02-23 NOTE — ACP (ADVANCE CARE PLANNING)
Advance Care Planning     Advance Care Planning Inpatient Note  Spiritual Care Department    Today's Date: 2/23/2024  Unit: [facility] 2W ICU    Received request from IDT Member.  Upon review of chart and communication with care team, patient's decision making abilities are not in question.. Patient and mother  was/were present in the room during visit.    Goals of ACP Conversation:  Discuss advance care planning documents  Facilitate a discussion related to patient's goals of care as they align with the patient's values and beliefs.    Health Care Decision Makers:     No healthcare decision makers have been documented.  Click here to complete HealthCare Decision Makers including selection of the Healthcare Decision Maker Relationship (ie \"Primary\")  Summary:  Completed New Documents    Advance Care Planning Documents (Patient Wishes):  Healthcare Power of /Advance Directive Appointment of Health Care Agent     Assessment:  Patient is alert & oriented. Patient expressed desire to complete HCPOA document.  facilitated completion of HCPOA document per patient wishes. Verified that ACP/Healthcare POA documents are present, complete, & witnessed as form requires. Verified accuracy of Healthcare Decision Maker(s) contact information. Ensured Decision Makers are designated Primary, Secondary or Supplemental. All patient's questioned answered. Patient encouraged to call Spiritual Care Services at [facility] if questions arise.     Interventions:  Provided education on documents for clarity and greater understanding  Discussed and provided education on state decision maker hierarchy  Assisted in the completion of documents according to patient's wishes at this time  Encouraged ongoing ACP conversation with future decision makers and loved ones    Care Preferences Communicated:   No    Outcomes/Plan:  ACP Discussion: Completed  Existing advance directive reviewed with patient; no changes to patient's previously

## 2024-02-23 NOTE — H&P
V2.0  History and Physical      Name:  Adama Tello /Age/Sex: 1970  (53 y.o. male)   MRN & CSN:  [id number] & [id number] Encounter Date/Time: 2024 10:25 PM EST   Location:   PCP: No primary care provider on file.       Hospital Day: 1    Assessment and Plan:   Adama Tello is a 53 y.o. male with a pmh of traumatic brain injury who presents with Hyponatremia; and sepsis secondary to pneumonia.        Plan:  Patient meets SIRS criteria with tachycardia and tachypnea.  Lactic acid was normal at 2.0.  Source of infection is lungs (pneumonia).  Patient was started on cefepime and doxycycline at the ED and that should be continued.    Will check MRSA.  Streptococcus urine antigen and Legionella urine antigen.  With hyponatremia will check uric acid level, serum and urine osmolality.  TSH with reflex T4 and a.m. cortisol.  Trend sodium.  Nephrology consulted from the ED.  Continue hypertonic saline sent from the ED.  I discussed advanced care planning with ED physician who had taken history from family.  Per ED physician patient is a full code.    Current Living situation: Home  Expected Disposition: Unclear at this point  Estimated D/C: Several days    Diet Diet NPO   DVT Prophylaxis [x] Lovenox, []  Heparin, [] SCDs, [] Ambulation,  [] Eliquis, [] Xarelto, [] Coumadin   Code Status Full code   Surrogate Decision Maker/ POA Unknown at this time.     Personally reviewed Lab Studies and Imaging     Discussed management of the case with ED physician who recommended admission    EKG interpreted personally and results showed left ventriculography    Imaging that was interpreted personally includes chest x-ray and results Airspace opacities in the lung bases, with streaky right perihilar opacities. Possible small right pleural effusion.  Agree with radiologist interpretation.    Drugs that require monitoring for toxicity include hypertonic saline and the method of monitoring was serial BMPs.        History

## 2024-02-23 NOTE — ED TRIAGE NOTES
Pt arrived via EMS from home after pt's nephew called for loss of consciousness and pt foaming at the mouth. EMS reports that the pt was sitting in his chair at home and the pt's nephew witnessed him fall over in his chair and start foaming at the mouth. EMS reports pt was unresponsive upon arrival. EMS reported pt was satting at 76% on room air and so EMS placed pt on 15L O2. Pt is satting at 97% room air at time of triage. Pt is alert but unable to follow commands. Pt only shaking head to yes and no questions at this time. Pt is oriented x4 at baseline per EMS. Pt has hx of stroke; nephew unsure if pt has contractures or other deficits at baseline. Pt had cranial fluid removed some time last week per EMS. Pt hypertensive at time of triage, otherwise VSS.

## 2024-02-23 NOTE — ED PROVIDER NOTES
[facility] EMERGENCY DEPARTMENT    Name: Adama Tello : 1970 MRN: [id number] Date of Service: 2024    Initial VS: BP: (!) 158/138, Temp: 99.2 °F (37.3 °C), Pulse: 98, Respirations: 24, SpO2: 97 %     CC: AMS    HPI: this patient is a 53 y.o. male presenting to the ED from home. Mr. Tello's family members tell me that for the last few days he has had a frequent harsh cough.  During the same time he has had very little - if anything - to eat or drink.  He has also been vomiting sporadically and he has had a little bit of diarrhea as of late.  This evening some family members checked on him and found him to be ill-appearing and lethargic.  They called 911 for assistance, EMS personnel arrived to their home, and they brought Mr. Tello to this department to be evaluated.  On arrival here Mr. Tello remains quite lethargic and is unable to provide any additional H&P details.  _____________________________________________________________________    Past Medical History:   Diagnosis Date    Esophageal achalasia     GERD (gastroesophageal reflux disease)     Hypertension     Hypertension     Marfan syndrome     Traumatic brain injury (HCC)       Past Surgical History:   Procedure Laterality Date    AORTIC ROOT REPLACEMENT      CRANIOTOMY      UPPER GASTROINTESTINAL ENDOSCOPY N/A 2023    EGD DILATION BALLOON performed by Ryan Tracy MD at [facility] ENDOSCOPY     No family history on file.  Social History     Tobacco Use    Smoking status: Every Day     Current packs/day: 0.25     Average packs/day: 0.3 packs/day for 5.0 years (1.3 ttl pk-yrs)     Types: Cigarettes    Smokeless tobacco: Never   Substance Use Topics    Alcohol use: Not Currently    Drug use: Not Currently     _____________________________________________________________________    Review of Systems   Unable to perform ROS: Mental status change   Constitutional:  Positive for appetite change.   Respiratory:  Positive for cough.

## 2024-02-23 NOTE — ACP (ADVANCE CARE PLANNING)
Advance Care Planning     Advance Care Planning Inpatient Note  Spiritual Care Department    Today's Date: 2/23/2024  Unit: [facility] 2W ICU    Received request from IDT Member.  Upon review of chart and communication with care team, patient's decision making abilities are not in question.. Patient was/were present in the room during visit.    Goals of ACP Conversation:  Discuss advance care planning documents  Facilitate a discussion related to patient's goals of care as they align with the patient's values and beliefs.    Health Care Decision Makers:     No healthcare decision makers have been documented.  Click here to complete HealthCare Decision Makers including selection of the Healthcare Decision Maker Relationship (ie \"Primary\")  Summary:  No Decision Maker named by patient at this time     Advance Care Planning Documents (Patient Wishes):  None     Assessment:   met with patient to discuss ACP. Patient expressed desire to review documents with his mother. Patient gave consent to  to explain details of this meeting with his mother on the phone. Patient will follow up with  when ready. All patient's questioned answered. Patient encouraged to call Spiritual Care Services at [facility] if questions arise.     Interventions:  Provided education on documents for clarity and greater understanding  Discussed and provided education on state decision maker hierarchy  Encouraged ongoing ACP conversation with future decision makers and loved ones    Care Preferences Communicated:   No    Outcomes/Plan:  ACP Discussion: Completed    Electronically signed by Chaplain Celeste on 2/23/2024 at 2:02 PM

## 2024-02-23 NOTE — ED NOTES
ED handoff report provided to MADELINE Kaufman. Patient to be transported to Room 2104 via stretcher. IV site clean, dry, and intact. Vitals stable. Patient updated on plan of care. All questions answered.    
EDMD at bedside  
Family reports pt has chronic hiccups  
Straight cath performed per sterile protocol. Urine specimen obtained and sent to lab. Call light within reach. Will continue to monitor patient.     
no

## 2024-02-24 ENCOUNTER — APPOINTMENT (OUTPATIENT)
Dept: MRI IMAGING | Age: 54
DRG: 720 | End: 2024-02-24
Payer: COMMERCIAL

## 2024-02-24 LAB
ANION GAP SERPL CALCULATED.3IONS-SCNC: 11 MMOL/L (ref 3–16)
BACTERIA URNS QL MICRO: ABNORMAL /HPF
BILIRUB UR QL STRIP.AUTO: NEGATIVE
BUN SERPL-MCNC: 7 MG/DL (ref 7–20)
CALCIUM SERPL-MCNC: 7.9 MG/DL (ref 8.3–10.6)
CHLORIDE SERPL-SCNC: 85 MMOL/L (ref 99–110)
CHLORIDE UR-SCNC: 124 MMOL/L
CLARITY UR: CLEAR
CO2 SERPL-SCNC: 20 MMOL/L (ref 21–32)
COLOR UR: YELLOW
CREAT SERPL-MCNC: 0.8 MG/DL (ref 0.9–1.3)
DEPRECATED RDW RBC AUTO: 16.6 % (ref 12.4–15.4)
EPI CELLS #/AREA URNS AUTO: 1 /HPF (ref 0–5)
FSH SERPL-ACNC: 5.6 MIU/ML
GFR SERPLBLD CREATININE-BSD FMLA CKD-EPI: >60 ML/MIN/{1.73_M2}
GLUCOSE SERPL-MCNC: 105 MG/DL (ref 70–99)
GLUCOSE UR STRIP.AUTO-MCNC: NEGATIVE MG/DL
HCT VFR BLD AUTO: 30.6 % (ref 40.5–52.5)
HCT VFR BLD AUTO: 31.5 % (ref 40.5–52.5)
HCT VFR BLD AUTO: 32.1 % (ref 40.5–52.5)
HCT VFR BLD AUTO: 32.3 % (ref 40.5–52.5)
HGB BLD-MCNC: 10.8 G/DL (ref 13.5–17.5)
HGB BLD-MCNC: 11.2 G/DL (ref 13.5–17.5)
HGB BLD-MCNC: 11.3 G/DL (ref 13.5–17.5)
HGB BLD-MCNC: 11.3 G/DL (ref 13.5–17.5)
HGB UR QL STRIP.AUTO: ABNORMAL
HYALINE CASTS #/AREA URNS AUTO: 2 /LPF (ref 0–8)
KETONES UR STRIP.AUTO-MCNC: 15 MG/DL
LEUKOCYTE ESTERASE UR QL STRIP.AUTO: NEGATIVE
LH SERPL-ACNC: 8.6 MIU/ML
MCH RBC QN AUTO: 27.5 PG (ref 26–34)
MCHC RBC AUTO-ENTMCNC: 35 G/DL (ref 31–36)
MCV RBC AUTO: 78.6 FL (ref 80–100)
NITRITE UR QL STRIP.AUTO: NEGATIVE
OSMOLALITY SERPL: 250 MOSM/KG (ref 275–295)
OSMOLALITY UR: 662 MOSM/KG (ref 390–1070)
PH UR STRIP.AUTO: 6 [PH] (ref 5–8)
PLATELET # BLD AUTO: 145 K/UL (ref 135–450)
PMV BLD AUTO: 9.3 FL (ref 5–10.5)
POTASSIUM SERPL-SCNC: 3.1 MMOL/L (ref 3.5–5.1)
POTASSIUM SERPL-SCNC: 3.4 MMOL/L (ref 3.5–5.1)
POTASSIUM SERPL-SCNC: 4.1 MMOL/L (ref 3.5–5.1)
POTASSIUM UR-SCNC: 32.9 MMOL/L
PROLACTIN SERPL IA-MCNC: 7.1 NG/ML
PROT UR STRIP.AUTO-MCNC: 100 MG/DL
RBC # BLD AUTO: 4.08 M/UL (ref 4.2–5.9)
RBC CLUMPS #/AREA URNS AUTO: 36 /HPF (ref 0–4)
SODIUM SERPL-SCNC: 113 MMOL/L (ref 136–145)
SODIUM SERPL-SCNC: 115 MMOL/L (ref 136–145)
SODIUM SERPL-SCNC: 116 MMOL/L (ref 136–145)
SODIUM SERPL-SCNC: 117 MMOL/L (ref 136–145)
SODIUM SERPL-SCNC: 118 MMOL/L (ref 136–145)
SODIUM UR-SCNC: 77 MMOL/L
SP GR UR STRIP.AUTO: 1.02 (ref 1–1.03)
T3 SERPL-MCNC: 0.5 NG/ML (ref 0.8–2)
T4 FREE SERPL-MCNC: 0.8 NG/DL (ref 0.9–1.8)
TSH SERPL DL<=0.005 MIU/L-ACNC: 0.23 UIU/ML (ref 0.27–4.2)
UA DIPSTICK W REFLEX MICRO PNL UR: YES
URN SPEC COLLECT METH UR: ABNORMAL
UROBILINOGEN UR STRIP-ACNC: 1 E.U./DL
WBC # BLD AUTO: 5.3 K/UL (ref 4–11)
WBC #/AREA URNS AUTO: 5 /HPF (ref 0–5)

## 2024-02-24 PROCEDURE — 83930 ASSAY OF BLOOD OSMOLALITY: CPT

## 2024-02-24 PROCEDURE — A9577 INJ MULTIHANCE: HCPCS | Performed by: STUDENT IN AN ORGANIZED HEALTH CARE EDUCATION/TRAINING PROGRAM

## 2024-02-24 PROCEDURE — 85018 HEMOGLOBIN: CPT

## 2024-02-24 PROCEDURE — 6370000000 HC RX 637 (ALT 250 FOR IP): Performed by: HOSPITALIST

## 2024-02-24 PROCEDURE — 6360000002 HC RX W HCPCS: Performed by: REGISTERED NURSE

## 2024-02-24 PROCEDURE — 80048 BASIC METABOLIC PNL TOTAL CA: CPT

## 2024-02-24 PROCEDURE — 84133 ASSAY OF URINE POTASSIUM: CPT

## 2024-02-24 PROCEDURE — 85014 HEMATOCRIT: CPT

## 2024-02-24 PROCEDURE — 84305 ASSAY OF SOMATOMEDIN: CPT

## 2024-02-24 PROCEDURE — 84480 ASSAY TRIIODOTHYRONINE (T3): CPT

## 2024-02-24 PROCEDURE — 36592 COLLECT BLOOD FROM PICC: CPT

## 2024-02-24 PROCEDURE — 2580000003 HC RX 258: Performed by: INTERNAL MEDICINE

## 2024-02-24 PROCEDURE — 84132 ASSAY OF SERUM POTASSIUM: CPT

## 2024-02-24 PROCEDURE — 83001 ASSAY OF GONADOTROPIN (FSH): CPT

## 2024-02-24 PROCEDURE — 97162 PT EVAL MOD COMPLEX 30 MIN: CPT

## 2024-02-24 PROCEDURE — 97530 THERAPEUTIC ACTIVITIES: CPT

## 2024-02-24 PROCEDURE — 2500000003 HC RX 250 WO HCPCS: Performed by: HOSPITALIST

## 2024-02-24 PROCEDURE — 6360000004 HC RX CONTRAST MEDICATION: Performed by: STUDENT IN AN ORGANIZED HEALTH CARE EDUCATION/TRAINING PROGRAM

## 2024-02-24 PROCEDURE — 2580000003 HC RX 258: Performed by: REGISTERED NURSE

## 2024-02-24 PROCEDURE — 2580000003 HC RX 258: Performed by: HOSPITALIST

## 2024-02-24 PROCEDURE — 2700000000 HC OXYGEN THERAPY PER DAY

## 2024-02-24 PROCEDURE — 84295 ASSAY OF SERUM SODIUM: CPT

## 2024-02-24 PROCEDURE — 82436 ASSAY OF URINE CHLORIDE: CPT

## 2024-02-24 PROCEDURE — 6360000002 HC RX W HCPCS: Performed by: HOSPITALIST

## 2024-02-24 PROCEDURE — 2000000000 HC ICU R&B

## 2024-02-24 PROCEDURE — 83002 ASSAY OF GONADOTROPIN (LH): CPT

## 2024-02-24 PROCEDURE — 81001 URINALYSIS AUTO W/SCOPE: CPT

## 2024-02-24 PROCEDURE — 6370000000 HC RX 637 (ALT 250 FOR IP): Performed by: STUDENT IN AN ORGANIZED HEALTH CARE EDUCATION/TRAINING PROGRAM

## 2024-02-24 PROCEDURE — 84146 ASSAY OF PROLACTIN: CPT

## 2024-02-24 PROCEDURE — 85027 COMPLETE CBC AUTOMATED: CPT

## 2024-02-24 PROCEDURE — 6360000002 HC RX W HCPCS: Performed by: INTERNAL MEDICINE

## 2024-02-24 PROCEDURE — 6370000000 HC RX 637 (ALT 250 FOR IP): Performed by: REGISTERED NURSE

## 2024-02-24 PROCEDURE — 97166 OT EVAL MOD COMPLEX 45 MIN: CPT

## 2024-02-24 PROCEDURE — 70553 MRI BRAIN STEM W/O & W/DYE: CPT

## 2024-02-24 PROCEDURE — C9113 INJ PANTOPRAZOLE SODIUM, VIA: HCPCS | Performed by: INTERNAL MEDICINE

## 2024-02-24 PROCEDURE — 84300 ASSAY OF URINE SODIUM: CPT

## 2024-02-24 PROCEDURE — 94761 N-INVAS EAR/PLS OXIMETRY MLT: CPT

## 2024-02-24 RX ORDER — GUAIFENESIN/DEXTROMETHORPHAN 100-10MG/5
5 SYRUP ORAL EVERY 4 HOURS PRN
Status: DISCONTINUED | OUTPATIENT
Start: 2024-02-24 | End: 2024-03-05 | Stop reason: HOSPADM

## 2024-02-24 RX ORDER — 3% SODIUM CHLORIDE 3 G/100ML
25 INJECTION, SOLUTION INTRAVENOUS CONTINUOUS
Status: DISCONTINUED | OUTPATIENT
Start: 2024-02-24 | End: 2024-02-25

## 2024-02-24 RX ADMIN — SODIUM CHLORIDE, PRESERVATIVE FREE 10 ML: 5 INJECTION INTRAVENOUS at 20:38

## 2024-02-24 RX ADMIN — PANTOPRAZOLE SODIUM 8 MG/HR: 40 INJECTION, POWDER, FOR SOLUTION INTRAVENOUS at 22:43

## 2024-02-24 RX ADMIN — SODIUM CHLORIDE, PRESERVATIVE FREE 10 ML: 5 INJECTION INTRAVENOUS at 08:28

## 2024-02-24 RX ADMIN — GADOBENATE DIMEGLUMINE 18 ML: 529 INJECTION, SOLUTION INTRAVENOUS at 16:34

## 2024-02-24 RX ADMIN — SODIUM CHLORIDE, PRESERVATIVE FREE 10 ML: 5 INJECTION INTRAVENOUS at 21:45

## 2024-02-24 RX ADMIN — POTASSIUM CHLORIDE 20 MEQ: 29.8 INJECTION, SOLUTION INTRAVENOUS at 14:12

## 2024-02-24 RX ADMIN — AMLODIPINE BESYLATE 10 MG: 10 TABLET ORAL at 10:26

## 2024-02-24 RX ADMIN — PANTOPRAZOLE SODIUM 8 MG/HR: 40 INJECTION, POWDER, FOR SOLUTION INTRAVENOUS at 11:30

## 2024-02-24 RX ADMIN — DEXTROSE MONOHYDRATE: 50 INJECTION, SOLUTION INTRAVENOUS at 10:18

## 2024-02-24 RX ADMIN — CEFEPIME 2000 MG: 2 INJECTION, POWDER, FOR SOLUTION INTRAVENOUS at 08:15

## 2024-02-24 RX ADMIN — CEFEPIME 2000 MG: 2 INJECTION, POWDER, FOR SOLUTION INTRAVENOUS at 16:55

## 2024-02-24 RX ADMIN — GUAIFENESIN SYRUP AND DEXTROMETHORPHAN 5 ML: 100; 10 SYRUP ORAL at 17:33

## 2024-02-24 RX ADMIN — ACETAMINOPHEN 650 MG: 325 TABLET ORAL at 11:26

## 2024-02-24 RX ADMIN — PANTOPRAZOLE SODIUM 8 MG/HR: 40 INJECTION, POWDER, FOR SOLUTION INTRAVENOUS at 01:30

## 2024-02-24 RX ADMIN — DOXYCYCLINE 100 MG: 100 INJECTION, POWDER, LYOPHILIZED, FOR SOLUTION INTRAVENOUS at 10:26

## 2024-02-24 RX ADMIN — VALSARTAN 320 MG: 160 TABLET ORAL at 11:19

## 2024-02-24 RX ADMIN — Medication 6 MG: at 21:03

## 2024-02-24 RX ADMIN — POTASSIUM CHLORIDE 20 MEQ: 29.8 INJECTION, SOLUTION INTRAVENOUS at 06:39

## 2024-02-24 RX ADMIN — SODIUM CHLORIDE 25 ML/HR: 3 INJECTION, SOLUTION INTRAVENOUS at 11:19

## 2024-02-24 RX ADMIN — POTASSIUM CHLORIDE 20 MEQ: 29.8 INJECTION, SOLUTION INTRAVENOUS at 16:54

## 2024-02-24 RX ADMIN — ACETAMINOPHEN 650 MG: 325 TABLET ORAL at 20:37

## 2024-02-24 RX ADMIN — DOXYCYCLINE 100 MG: 100 INJECTION, POWDER, LYOPHILIZED, FOR SOLUTION INTRAVENOUS at 21:08

## 2024-02-24 RX ADMIN — POTASSIUM CHLORIDE 20 MEQ: 29.8 INJECTION, SOLUTION INTRAVENOUS at 05:35

## 2024-02-24 ASSESSMENT — PAIN SCALES - GENERAL
PAINLEVEL_OUTOF10: 2
PAINLEVEL_OUTOF10: 0
PAINLEVEL_OUTOF10: 1
PAINLEVEL_OUTOF10: 0

## 2024-02-24 ASSESSMENT — PAIN - FUNCTIONAL ASSESSMENT
PAIN_FUNCTIONAL_ASSESSMENT: ACTIVITIES ARE NOT PREVENTED
PAIN_FUNCTIONAL_ASSESSMENT: ACTIVITIES ARE NOT PREVENTED

## 2024-02-24 ASSESSMENT — PAIN DESCRIPTION - LOCATION
LOCATION: GENERALIZED
LOCATION: GENERALIZED

## 2024-02-24 ASSESSMENT — PAIN DESCRIPTION - DESCRIPTORS
DESCRIPTORS: ACHING
DESCRIPTORS: ACHING;SORE

## 2024-02-24 ASSESSMENT — PAIN DESCRIPTION - PAIN TYPE: TYPE: CHRONIC PAIN

## 2024-02-25 LAB
ANION GAP SERPL CALCULATED.3IONS-SCNC: 11 MMOL/L (ref 3–16)
BUN SERPL-MCNC: 4 MG/DL (ref 7–20)
CALCIUM SERPL-MCNC: 7.8 MG/DL (ref 8.3–10.6)
CHLORIDE SERPL-SCNC: 93 MMOL/L (ref 99–110)
CO2 SERPL-SCNC: 18 MMOL/L (ref 21–32)
CORTIS AM PEAK SERPL-MCNC: 13 UG/DL (ref 4.3–22.4)
CREAT SERPL-MCNC: 0.8 MG/DL (ref 0.9–1.3)
GFR SERPLBLD CREATININE-BSD FMLA CKD-EPI: >60 ML/MIN/{1.73_M2}
GLUCOSE SERPL-MCNC: 116 MG/DL (ref 70–99)
HCT VFR BLD AUTO: 29.8 % (ref 40.5–52.5)
HCT VFR BLD AUTO: 30.5 % (ref 40.5–52.5)
HCT VFR BLD AUTO: 30.8 % (ref 40.5–52.5)
HCT VFR BLD AUTO: 31.5 % (ref 40.5–52.5)
HGB BLD-MCNC: 10.5 G/DL (ref 13.5–17.5)
HGB BLD-MCNC: 10.9 G/DL (ref 13.5–17.5)
HGB BLD-MCNC: 10.9 G/DL (ref 13.5–17.5)
HGB BLD-MCNC: 11.2 G/DL (ref 13.5–17.5)
MAGNESIUM SERPL-MCNC: 2 MG/DL (ref 1.8–2.4)
POTASSIUM SERPL-SCNC: 4 MMOL/L (ref 3.5–5.1)
SODIUM SERPL-SCNC: 117 MMOL/L (ref 136–145)
SODIUM SERPL-SCNC: 122 MMOL/L (ref 136–145)
SODIUM SERPL-SCNC: 123 MMOL/L (ref 136–145)
SODIUM SERPL-SCNC: 123 MMOL/L (ref 136–145)
SODIUM SERPL-SCNC: 124 MMOL/L (ref 136–145)
SODIUM SERPL-SCNC: 124 MMOL/L (ref 136–145)
T3 SERPL-MCNC: 1 NG/ML (ref 0.8–2)
T3FREE SERPL-MCNC: 2.4 PG/ML (ref 2.3–4.2)
T4 SERPL-MCNC: 5.4 UG/DL (ref 4.5–10.9)
TSH SERPL DL<=0.005 MIU/L-ACNC: 1.85 UIU/ML (ref 0.27–4.2)

## 2024-02-25 PROCEDURE — 6360000002 HC RX W HCPCS: Performed by: HOSPITALIST

## 2024-02-25 PROCEDURE — 2580000003 HC RX 258: Performed by: HOSPITALIST

## 2024-02-25 PROCEDURE — 2500000003 HC RX 250 WO HCPCS: Performed by: HOSPITALIST

## 2024-02-25 PROCEDURE — 2580000003 HC RX 258: Performed by: INTERNAL MEDICINE

## 2024-02-25 PROCEDURE — 83735 ASSAY OF MAGNESIUM: CPT

## 2024-02-25 PROCEDURE — 6360000002 HC RX W HCPCS: Performed by: INTERNAL MEDICINE

## 2024-02-25 PROCEDURE — 94761 N-INVAS EAR/PLS OXIMETRY MLT: CPT

## 2024-02-25 PROCEDURE — 80048 BASIC METABOLIC PNL TOTAL CA: CPT

## 2024-02-25 PROCEDURE — 6370000000 HC RX 637 (ALT 250 FOR IP): Performed by: HOSPITALIST

## 2024-02-25 PROCEDURE — C9113 INJ PANTOPRAZOLE SODIUM, VIA: HCPCS | Performed by: INTERNAL MEDICINE

## 2024-02-25 PROCEDURE — 82533 TOTAL CORTISOL: CPT

## 2024-02-25 PROCEDURE — 85018 HEMOGLOBIN: CPT

## 2024-02-25 PROCEDURE — 2700000000 HC OXYGEN THERAPY PER DAY

## 2024-02-25 PROCEDURE — 6370000000 HC RX 637 (ALT 250 FOR IP): Performed by: STUDENT IN AN ORGANIZED HEALTH CARE EDUCATION/TRAINING PROGRAM

## 2024-02-25 PROCEDURE — 84295 ASSAY OF SERUM SODIUM: CPT

## 2024-02-25 PROCEDURE — 36592 COLLECT BLOOD FROM PICC: CPT

## 2024-02-25 PROCEDURE — 6370000000 HC RX 637 (ALT 250 FOR IP): Performed by: REGISTERED NURSE

## 2024-02-25 PROCEDURE — 2580000003 HC RX 258: Performed by: REGISTERED NURSE

## 2024-02-25 PROCEDURE — 84481 FREE ASSAY (FT-3): CPT

## 2024-02-25 PROCEDURE — 85014 HEMATOCRIT: CPT

## 2024-02-25 PROCEDURE — 84436 ASSAY OF TOTAL THYROXINE: CPT

## 2024-02-25 PROCEDURE — 84443 ASSAY THYROID STIM HORMONE: CPT

## 2024-02-25 PROCEDURE — 2000000000 HC ICU R&B

## 2024-02-25 RX ORDER — SODIUM CHLORIDE 9 MG/ML
INJECTION, SOLUTION INTRAVENOUS CONTINUOUS
Status: DISCONTINUED | OUTPATIENT
Start: 2024-02-25 | End: 2024-02-27

## 2024-02-25 RX ADMIN — CEFEPIME 2000 MG: 2 INJECTION, POWDER, FOR SOLUTION INTRAVENOUS at 08:08

## 2024-02-25 RX ADMIN — AMLODIPINE BESYLATE 10 MG: 10 TABLET ORAL at 09:09

## 2024-02-25 RX ADMIN — GUAIFENESIN SYRUP AND DEXTROMETHORPHAN 5 ML: 100; 10 SYRUP ORAL at 18:12

## 2024-02-25 RX ADMIN — CEFEPIME 2000 MG: 2 INJECTION, POWDER, FOR SOLUTION INTRAVENOUS at 15:49

## 2024-02-25 RX ADMIN — CEFEPIME 2000 MG: 2 INJECTION, POWDER, FOR SOLUTION INTRAVENOUS at 00:53

## 2024-02-25 RX ADMIN — SODIUM CHLORIDE: 9 INJECTION, SOLUTION INTRAVENOUS at 15:50

## 2024-02-25 RX ADMIN — GUAIFENESIN SYRUP AND DEXTROMETHORPHAN 5 ML: 100; 10 SYRUP ORAL at 13:10

## 2024-02-25 RX ADMIN — VALSARTAN 320 MG: 160 TABLET ORAL at 09:09

## 2024-02-25 RX ADMIN — Medication 6 MG: at 19:56

## 2024-02-25 RX ADMIN — PANTOPRAZOLE SODIUM 8 MG/HR: 40 INJECTION, POWDER, FOR SOLUTION INTRAVENOUS at 16:42

## 2024-02-25 RX ADMIN — SODIUM CHLORIDE, PRESERVATIVE FREE 20 ML: 5 INJECTION INTRAVENOUS at 20:59

## 2024-02-25 RX ADMIN — ACETAMINOPHEN 650 MG: 325 TABLET ORAL at 13:10

## 2024-02-25 RX ADMIN — DOXYCYCLINE 100 MG: 100 INJECTION, POWDER, LYOPHILIZED, FOR SOLUTION INTRAVENOUS at 09:16

## 2024-02-25 RX ADMIN — SODIUM CHLORIDE, PRESERVATIVE FREE 10 ML: 5 INJECTION INTRAVENOUS at 09:09

## 2024-02-25 RX ADMIN — ACETAMINOPHEN 650 MG: 325 TABLET ORAL at 19:56

## 2024-02-25 RX ADMIN — DOXYCYCLINE 100 MG: 100 INJECTION, POWDER, LYOPHILIZED, FOR SOLUTION INTRAVENOUS at 19:54

## 2024-02-25 RX ADMIN — PANTOPRAZOLE SODIUM 8 MG/HR: 40 INJECTION, POWDER, FOR SOLUTION INTRAVENOUS at 10:02

## 2024-02-25 RX ADMIN — GUAIFENESIN SYRUP AND DEXTROMETHORPHAN 5 ML: 100; 10 SYRUP ORAL at 09:09

## 2024-02-25 ASSESSMENT — PAIN DESCRIPTION - LOCATION
LOCATION: GENERALIZED

## 2024-02-25 ASSESSMENT — PAIN SCALES - GENERAL
PAINLEVEL_OUTOF10: 3
PAINLEVEL_OUTOF10: 2
PAINLEVEL_OUTOF10: 0
PAINLEVEL_OUTOF10: 2

## 2024-02-25 ASSESSMENT — PAIN DESCRIPTION - DESCRIPTORS
DESCRIPTORS: ACHING
DESCRIPTORS: ACHING;SORE
DESCRIPTORS: ACHING

## 2024-02-25 ASSESSMENT — PAIN - FUNCTIONAL ASSESSMENT: PAIN_FUNCTIONAL_ASSESSMENT: ACTIVITIES ARE NOT PREVENTED

## 2024-02-26 PROBLEM — R41.82 ALTERED MENTAL STATUS: Status: ACTIVE | Noted: 2024-02-26

## 2024-02-26 PROBLEM — R50.9 FEVER AND CHILLS: Status: ACTIVE | Noted: 2024-02-26

## 2024-02-26 PROBLEM — D69.6 THROMBOCYTOPENIA (HCC): Status: ACTIVE | Noted: 2024-02-26

## 2024-02-26 PROBLEM — J18.9 PNEUMONIA DUE TO INFECTIOUS ORGANISM: Status: ACTIVE | Noted: 2024-02-26

## 2024-02-26 PROBLEM — E83.42 HYPOMAGNESEMIA: Status: ACTIVE | Noted: 2024-02-26

## 2024-02-26 PROBLEM — R78.81 BACTEREMIA: Status: ACTIVE | Noted: 2024-02-26

## 2024-02-26 PROBLEM — Z98.890 HX OF REPAIR OF AORTIC ROOT: Status: ACTIVE | Noted: 2024-02-26

## 2024-02-26 LAB
ALBUMIN SERPL-MCNC: 3 G/DL (ref 3.4–5)
ANION GAP SERPL CALCULATED.3IONS-SCNC: 9 MMOL/L (ref 3–16)
BUN SERPL-MCNC: 4 MG/DL (ref 7–20)
CALCIUM SERPL-MCNC: 7.9 MG/DL (ref 8.3–10.6)
CHLORIDE SERPL-SCNC: 98 MMOL/L (ref 99–110)
CO2 SERPL-SCNC: 20 MMOL/L (ref 21–32)
CREAT SERPL-MCNC: 0.7 MG/DL (ref 0.9–1.3)
DEPRECATED RDW RBC AUTO: 16.8 % (ref 12.4–15.4)
GFR SERPLBLD CREATININE-BSD FMLA CKD-EPI: >60 ML/MIN/{1.73_M2}
GLUCOSE SERPL-MCNC: 107 MG/DL (ref 70–99)
HCT VFR BLD AUTO: 28.4 % (ref 40.5–52.5)
HCT VFR BLD AUTO: 29.1 % (ref 40.5–52.5)
HGB BLD-MCNC: 10.3 G/DL (ref 13.5–17.5)
HGB BLD-MCNC: 9.9 G/DL (ref 13.5–17.5)
MCH RBC QN AUTO: 27.6 PG (ref 26–34)
MCHC RBC AUTO-ENTMCNC: 35.5 G/DL (ref 31–36)
MCV RBC AUTO: 77.7 FL (ref 80–100)
PHOSPHATE SERPL-MCNC: 1.8 MG/DL (ref 2.5–4.9)
PLATELET # BLD AUTO: 142 K/UL (ref 135–450)
PMV BLD AUTO: 9.1 FL (ref 5–10.5)
POTASSIUM SERPL-SCNC: 3.9 MMOL/L (ref 3.5–5.1)
PROT PATTERN UR ELPH-IMP: NORMAL
RBC # BLD AUTO: 3.74 M/UL (ref 4.2–5.9)
SODIUM SERPL-SCNC: 126 MMOL/L (ref 136–145)
SODIUM SERPL-SCNC: 127 MMOL/L (ref 136–145)
SODIUM SERPL-SCNC: 130 MMOL/L (ref 136–145)
SODIUM SERPL-SCNC: 131 MMOL/L (ref 136–145)
WBC # BLD AUTO: 5.1 K/UL (ref 4–11)

## 2024-02-26 PROCEDURE — 6370000000 HC RX 637 (ALT 250 FOR IP): Performed by: HOSPITALIST

## 2024-02-26 PROCEDURE — 36592 COLLECT BLOOD FROM PICC: CPT

## 2024-02-26 PROCEDURE — 2580000003 HC RX 258: Performed by: STUDENT IN AN ORGANIZED HEALTH CARE EDUCATION/TRAINING PROGRAM

## 2024-02-26 PROCEDURE — 85018 HEMOGLOBIN: CPT

## 2024-02-26 PROCEDURE — 2580000003 HC RX 258: Performed by: HOSPITALIST

## 2024-02-26 PROCEDURE — 85027 COMPLETE CBC AUTOMATED: CPT

## 2024-02-26 PROCEDURE — 87040 BLOOD CULTURE FOR BACTERIA: CPT

## 2024-02-26 PROCEDURE — 2700000000 HC OXYGEN THERAPY PER DAY

## 2024-02-26 PROCEDURE — 51702 INSERT TEMP BLADDER CATH: CPT

## 2024-02-26 PROCEDURE — 2500000003 HC RX 250 WO HCPCS: Performed by: HOSPITALIST

## 2024-02-26 PROCEDURE — 99223 1ST HOSP IP/OBS HIGH 75: CPT | Performed by: INTERNAL MEDICINE

## 2024-02-26 PROCEDURE — 6370000000 HC RX 637 (ALT 250 FOR IP): Performed by: PHYSICIAN ASSISTANT

## 2024-02-26 PROCEDURE — 84295 ASSAY OF SERUM SODIUM: CPT

## 2024-02-26 PROCEDURE — 94761 N-INVAS EAR/PLS OXIMETRY MLT: CPT

## 2024-02-26 PROCEDURE — 6370000000 HC RX 637 (ALT 250 FOR IP): Performed by: REGISTERED NURSE

## 2024-02-26 PROCEDURE — 6360000002 HC RX W HCPCS: Performed by: HOSPITALIST

## 2024-02-26 PROCEDURE — C9113 INJ PANTOPRAZOLE SODIUM, VIA: HCPCS | Performed by: INTERNAL MEDICINE

## 2024-02-26 PROCEDURE — 2580000003 HC RX 258: Performed by: INTERNAL MEDICINE

## 2024-02-26 PROCEDURE — 85014 HEMATOCRIT: CPT

## 2024-02-26 PROCEDURE — 80069 RENAL FUNCTION PANEL: CPT

## 2024-02-26 PROCEDURE — 6370000000 HC RX 637 (ALT 250 FOR IP): Performed by: STUDENT IN AN ORGANIZED HEALTH CARE EDUCATION/TRAINING PROGRAM

## 2024-02-26 PROCEDURE — 36415 COLL VENOUS BLD VENIPUNCTURE: CPT

## 2024-02-26 PROCEDURE — 6360000002 HC RX W HCPCS: Performed by: INTERNAL MEDICINE

## 2024-02-26 PROCEDURE — 2580000003 HC RX 258: Performed by: REGISTERED NURSE

## 2024-02-26 PROCEDURE — 2500000003 HC RX 250 WO HCPCS: Performed by: STUDENT IN AN ORGANIZED HEALTH CARE EDUCATION/TRAINING PROGRAM

## 2024-02-26 PROCEDURE — 2060000000 HC ICU INTERMEDIATE R&B

## 2024-02-26 RX ORDER — PANTOPRAZOLE SODIUM 40 MG/1
40 TABLET, DELAYED RELEASE ORAL
Status: DISCONTINUED | OUTPATIENT
Start: 2024-02-26 | End: 2024-02-28

## 2024-02-26 RX ADMIN — SODIUM CHLORIDE, PRESERVATIVE FREE 10 ML: 5 INJECTION INTRAVENOUS at 09:29

## 2024-02-26 RX ADMIN — CEFEPIME 2000 MG: 2 INJECTION, POWDER, FOR SOLUTION INTRAVENOUS at 16:15

## 2024-02-26 RX ADMIN — DOXYCYCLINE 100 MG: 100 INJECTION, POWDER, LYOPHILIZED, FOR SOLUTION INTRAVENOUS at 20:25

## 2024-02-26 RX ADMIN — CEFEPIME 2000 MG: 2 INJECTION, POWDER, FOR SOLUTION INTRAVENOUS at 23:31

## 2024-02-26 RX ADMIN — DOXYCYCLINE 100 MG: 100 INJECTION, POWDER, LYOPHILIZED, FOR SOLUTION INTRAVENOUS at 08:22

## 2024-02-26 RX ADMIN — VANCOMYCIN HYDROCHLORIDE 1000 MG: 1 INJECTION, POWDER, LYOPHILIZED, FOR SOLUTION INTRAVENOUS at 23:29

## 2024-02-26 RX ADMIN — ZOLPIDEM TARTRATE 10 MG: 5 TABLET ORAL at 00:38

## 2024-02-26 RX ADMIN — GUAIFENESIN SYRUP AND DEXTROMETHORPHAN 5 ML: 100; 10 SYRUP ORAL at 17:37

## 2024-02-26 RX ADMIN — ACETAMINOPHEN 650 MG: 325 TABLET ORAL at 11:58

## 2024-02-26 RX ADMIN — SODIUM CHLORIDE, PRESERVATIVE FREE 10 ML: 5 INJECTION INTRAVENOUS at 20:22

## 2024-02-26 RX ADMIN — PANTOPRAZOLE SODIUM 8 MG/HR: 40 INJECTION, POWDER, FOR SOLUTION INTRAVENOUS at 01:53

## 2024-02-26 RX ADMIN — SODIUM CHLORIDE: 9 INJECTION, SOLUTION INTRAVENOUS at 11:06

## 2024-02-26 RX ADMIN — CEFEPIME 2000 MG: 2 INJECTION, POWDER, FOR SOLUTION INTRAVENOUS at 00:37

## 2024-02-26 RX ADMIN — SODIUM PHOSPHATE, MONOBASIC, MONOHYDRATE AND SODIUM PHOSPHATE, DIBASIC, ANHYDROUS 13.29 MMOL: 142; 276 INJECTION, SOLUTION INTRAVENOUS at 14:05

## 2024-02-26 RX ADMIN — Medication 6 MG: at 20:22

## 2024-02-26 RX ADMIN — ZOLPIDEM TARTRATE 10 MG: 5 TABLET ORAL at 20:23

## 2024-02-26 RX ADMIN — CEFEPIME 2000 MG: 2 INJECTION, POWDER, FOR SOLUTION INTRAVENOUS at 09:28

## 2024-02-26 RX ADMIN — ACETAMINOPHEN 650 MG: 325 TABLET ORAL at 06:11

## 2024-02-26 RX ADMIN — ACETAMINOPHEN 650 MG: 325 TABLET ORAL at 20:22

## 2024-02-26 RX ADMIN — PANTOPRAZOLE SODIUM 40 MG: 40 TABLET, DELAYED RELEASE ORAL at 08:23

## 2024-02-26 ASSESSMENT — PAIN DESCRIPTION - ORIENTATION
ORIENTATION: MID

## 2024-02-26 ASSESSMENT — PAIN DESCRIPTION - PAIN TYPE
TYPE: CHRONIC PAIN

## 2024-02-26 ASSESSMENT — PAIN DESCRIPTION - DESCRIPTORS
DESCRIPTORS: ACHING
DESCRIPTORS: ACHING
DESCRIPTORS: ACHING;SORE

## 2024-02-26 ASSESSMENT — PAIN DESCRIPTION - LOCATION
LOCATION: GENERALIZED
LOCATION: ABDOMEN
LOCATION: GENERALIZED
LOCATION: GENERALIZED;CHEST;ABDOMEN
LOCATION: GENERALIZED

## 2024-02-26 ASSESSMENT — PAIN SCALES - GENERAL
PAINLEVEL_OUTOF10: 0
PAINLEVEL_OUTOF10: 2
PAINLEVEL_OUTOF10: 2
PAINLEVEL_OUTOF10: 3
PAINLEVEL_OUTOF10: 0
PAINLEVEL_OUTOF10: 2
PAINLEVEL_OUTOF10: 1
PAINLEVEL_OUTOF10: 2
PAINLEVEL_OUTOF10: 1
PAINLEVEL_OUTOF10: 3
PAINLEVEL_OUTOF10: 1

## 2024-02-27 LAB
ANION GAP SERPL CALCULATED.3IONS-SCNC: 8 MMOL/L (ref 3–16)
BUN SERPL-MCNC: 4 MG/DL (ref 7–20)
CALCIUM SERPL-MCNC: 8.2 MG/DL (ref 8.3–10.6)
CHLORIDE SERPL-SCNC: 101 MMOL/L (ref 99–110)
CO2 SERPL-SCNC: 23 MMOL/L (ref 21–32)
CREAT SERPL-MCNC: 0.7 MG/DL (ref 0.9–1.3)
CRP SERPL-MCNC: 167.6 MG/L (ref 0–5.1)
ERYTHROCYTE [SEDIMENTATION RATE] IN BLOOD BY WESTERGREN METHOD: 34 MM/HR (ref 0–20)
GFR SERPLBLD CREATININE-BSD FMLA CKD-EPI: >60 ML/MIN/{1.73_M2}
GLUCOSE SERPL-MCNC: 102 MG/DL (ref 70–99)
HCT VFR BLD AUTO: 26.6 % (ref 40.5–52.5)
HGB BLD-MCNC: 9.5 G/DL (ref 13.5–17.5)
IGF-I SERPL-MCNC: 168 NG/ML (ref 65–222)
IGF-I Z-SCORE SERPL: 0.9
POTASSIUM SERPL-SCNC: 3.6 MMOL/L (ref 3.5–5.1)
PROCALCITONIN SERPL IA-MCNC: 0.21 NG/ML (ref 0–0.15)
SODIUM SERPL-SCNC: 132 MMOL/L (ref 136–145)

## 2024-02-27 PROCEDURE — 99233 SBSQ HOSP IP/OBS HIGH 50: CPT | Performed by: INTERNAL MEDICINE

## 2024-02-27 PROCEDURE — 2580000003 HC RX 258: Performed by: REGISTERED NURSE

## 2024-02-27 PROCEDURE — 6360000002 HC RX W HCPCS: Performed by: INTERNAL MEDICINE

## 2024-02-27 PROCEDURE — 80048 BASIC METABOLIC PNL TOTAL CA: CPT

## 2024-02-27 PROCEDURE — 6360000002 HC RX W HCPCS: Performed by: HOSPITALIST

## 2024-02-27 PROCEDURE — 97530 THERAPEUTIC ACTIVITIES: CPT

## 2024-02-27 PROCEDURE — 2700000000 HC OXYGEN THERAPY PER DAY

## 2024-02-27 PROCEDURE — 2580000003 HC RX 258: Performed by: INTERNAL MEDICINE

## 2024-02-27 PROCEDURE — 84145 PROCALCITONIN (PCT): CPT

## 2024-02-27 PROCEDURE — 93306 TTE W/DOPPLER COMPLETE: CPT

## 2024-02-27 PROCEDURE — 2580000003 HC RX 258: Performed by: HOSPITALIST

## 2024-02-27 PROCEDURE — 94761 N-INVAS EAR/PLS OXIMETRY MLT: CPT

## 2024-02-27 PROCEDURE — 2500000003 HC RX 250 WO HCPCS: Performed by: HOSPITALIST

## 2024-02-27 PROCEDURE — 6370000000 HC RX 637 (ALT 250 FOR IP): Performed by: STUDENT IN AN ORGANIZED HEALTH CARE EDUCATION/TRAINING PROGRAM

## 2024-02-27 PROCEDURE — 85018 HEMOGLOBIN: CPT

## 2024-02-27 PROCEDURE — 2060000000 HC ICU INTERMEDIATE R&B

## 2024-02-27 PROCEDURE — 85652 RBC SED RATE AUTOMATED: CPT

## 2024-02-27 PROCEDURE — 6370000000 HC RX 637 (ALT 250 FOR IP): Performed by: PHYSICIAN ASSISTANT

## 2024-02-27 PROCEDURE — 86140 C-REACTIVE PROTEIN: CPT

## 2024-02-27 PROCEDURE — 85014 HEMATOCRIT: CPT

## 2024-02-27 RX ORDER — CALCIUM CARBONATE 500 MG/1
500 TABLET, CHEWABLE ORAL 3 TIMES DAILY PRN
Status: DISCONTINUED | OUTPATIENT
Start: 2024-02-27 | End: 2024-03-05 | Stop reason: HOSPADM

## 2024-02-27 RX ADMIN — VANCOMYCIN HYDROCHLORIDE 1000 MG: 1 INJECTION, POWDER, LYOPHILIZED, FOR SOLUTION INTRAVENOUS at 10:48

## 2024-02-27 RX ADMIN — VANCOMYCIN HYDROCHLORIDE 1000 MG: 1 INJECTION, POWDER, LYOPHILIZED, FOR SOLUTION INTRAVENOUS at 17:35

## 2024-02-27 RX ADMIN — CEFEPIME 2000 MG: 2 INJECTION, POWDER, FOR SOLUTION INTRAVENOUS at 16:55

## 2024-02-27 RX ADMIN — PANTOPRAZOLE SODIUM 40 MG: 40 TABLET, DELAYED RELEASE ORAL at 08:02

## 2024-02-27 RX ADMIN — SODIUM CHLORIDE: 9 INJECTION, SOLUTION INTRAVENOUS at 07:54

## 2024-02-27 RX ADMIN — GUAIFENESIN SYRUP AND DEXTROMETHORPHAN 5 ML: 100; 10 SYRUP ORAL at 18:19

## 2024-02-27 RX ADMIN — VALSARTAN 320 MG: 160 TABLET ORAL at 08:02

## 2024-02-27 RX ADMIN — SODIUM CHLORIDE, PRESERVATIVE FREE 10 ML: 5 INJECTION INTRAVENOUS at 07:57

## 2024-02-27 RX ADMIN — ANTACID TABLETS 500 MG: 500 TABLET, CHEWABLE ORAL at 18:19

## 2024-02-27 RX ADMIN — CEFEPIME 2000 MG: 2 INJECTION, POWDER, FOR SOLUTION INTRAVENOUS at 23:03

## 2024-02-27 RX ADMIN — DOXYCYCLINE 100 MG: 100 INJECTION, POWDER, LYOPHILIZED, FOR SOLUTION INTRAVENOUS at 21:03

## 2024-02-27 RX ADMIN — DOXYCYCLINE 100 MG: 100 INJECTION, POWDER, LYOPHILIZED, FOR SOLUTION INTRAVENOUS at 07:57

## 2024-02-27 RX ADMIN — AMLODIPINE BESYLATE 10 MG: 10 TABLET ORAL at 08:02

## 2024-02-27 RX ADMIN — CEFEPIME 2000 MG: 2 INJECTION, POWDER, FOR SOLUTION INTRAVENOUS at 07:56

## 2024-02-27 ASSESSMENT — PAIN SCALES - GENERAL
PAINLEVEL_OUTOF10: 0

## 2024-02-28 ENCOUNTER — APPOINTMENT (OUTPATIENT)
Dept: CT IMAGING | Age: 54
DRG: 720 | End: 2024-02-28
Payer: COMMERCIAL

## 2024-02-28 LAB
ANION GAP SERPL CALCULATED.3IONS-SCNC: 14 MMOL/L (ref 3–16)
ANISOCYTOSIS BLD QL SMEAR: ABNORMAL
BASOPHILS # BLD: 0.1 K/UL (ref 0–0.2)
BASOPHILS NFR BLD: 0.9 %
BUN SERPL-MCNC: 3 MG/DL (ref 7–20)
BURR CELLS BLD QL SMEAR: ABNORMAL
CALCIUM SERPL-MCNC: 8.4 MG/DL (ref 8.3–10.6)
CHLORIDE SERPL-SCNC: 97 MMOL/L (ref 99–110)
CO2 SERPL-SCNC: 19 MMOL/L (ref 21–32)
CREAT SERPL-MCNC: 0.6 MG/DL (ref 0.9–1.3)
DEPRECATED RDW RBC AUTO: 16.8 % (ref 12.4–15.4)
EOSINOPHIL # BLD: 0.1 K/UL (ref 0–0.6)
EOSINOPHIL NFR BLD: 1.1 %
GFR SERPLBLD CREATININE-BSD FMLA CKD-EPI: >60 ML/MIN/{1.73_M2}
GLUCOSE SERPL-MCNC: 98 MG/DL (ref 70–99)
HCT VFR BLD AUTO: 27.9 % (ref 40.5–52.5)
HGB BLD-MCNC: 10 G/DL (ref 13.5–17.5)
HYPOCHROMIA BLD QL SMEAR: ABNORMAL
LYMPHOCYTES # BLD: 1.6 K/UL (ref 1–5.1)
LYMPHOCYTES NFR BLD: 20.1 %
MCH RBC QN AUTO: 27.7 PG (ref 26–34)
MCHC RBC AUTO-ENTMCNC: 36 G/DL (ref 31–36)
MCV RBC AUTO: 77 FL (ref 80–100)
MONOCYTES # BLD: 1.4 K/UL (ref 0–1.3)
MONOCYTES NFR BLD: 18.6 %
NEUTROPHILS # BLD: 4.6 K/UL (ref 1.7–7.7)
NEUTROPHILS NFR BLD: 59.3 %
OVALOCYTES BLD QL SMEAR: ABNORMAL
PHOSPHATE SERPL-MCNC: 2.1 MG/DL (ref 2.5–4.9)
PLATELET # BLD AUTO: 144 K/UL (ref 135–450)
PLATELET BLD QL SMEAR: ADEQUATE
PMV BLD AUTO: 9.2 FL (ref 5–10.5)
POIKILOCYTOSIS BLD QL SMEAR: ABNORMAL
POTASSIUM SERPL-SCNC: 3.4 MMOL/L (ref 3.5–5.1)
RBC # BLD AUTO: 3.62 M/UL (ref 4.2–5.9)
SLIDE REVIEW: ABNORMAL
SODIUM SERPL-SCNC: 128 MMOL/L (ref 136–145)
SODIUM SERPL-SCNC: 130 MMOL/L (ref 136–145)
SODIUM SERPL-SCNC: 131 MMOL/L (ref 136–145)
VANCOMYCIN SERPL-MCNC: 13.2 UG/ML
WBC # BLD AUTO: 7.7 K/UL (ref 4–11)

## 2024-02-28 PROCEDURE — 85025 COMPLETE CBC W/AUTO DIFF WBC: CPT

## 2024-02-28 PROCEDURE — 6370000000 HC RX 637 (ALT 250 FOR IP): Performed by: PHYSICIAN ASSISTANT

## 2024-02-28 PROCEDURE — 6360000002 HC RX W HCPCS: Performed by: HOSPITALIST

## 2024-02-28 PROCEDURE — 36415 COLL VENOUS BLD VENIPUNCTURE: CPT

## 2024-02-28 PROCEDURE — 84100 ASSAY OF PHOSPHORUS: CPT

## 2024-02-28 PROCEDURE — 80202 ASSAY OF VANCOMYCIN: CPT

## 2024-02-28 PROCEDURE — 6360000002 HC RX W HCPCS: Performed by: INTERNAL MEDICINE

## 2024-02-28 PROCEDURE — 94660 CPAP INITIATION&MGMT: CPT

## 2024-02-28 PROCEDURE — 6370000000 HC RX 637 (ALT 250 FOR IP): Performed by: STUDENT IN AN ORGANIZED HEALTH CARE EDUCATION/TRAINING PROGRAM

## 2024-02-28 PROCEDURE — 5A09357 ASSISTANCE WITH RESPIRATORY VENTILATION, LESS THAN 24 CONSECUTIVE HOURS, CONTINUOUS POSITIVE AIRWAY PRESSURE: ICD-10-PCS | Performed by: HOSPITALIST

## 2024-02-28 PROCEDURE — 6360000004 HC RX CONTRAST MEDICATION: Performed by: STUDENT IN AN ORGANIZED HEALTH CARE EDUCATION/TRAINING PROGRAM

## 2024-02-28 PROCEDURE — 2580000003 HC RX 258: Performed by: STUDENT IN AN ORGANIZED HEALTH CARE EDUCATION/TRAINING PROGRAM

## 2024-02-28 PROCEDURE — 71260 CT THORAX DX C+: CPT

## 2024-02-28 PROCEDURE — 99233 SBSQ HOSP IP/OBS HIGH 50: CPT | Performed by: INTERNAL MEDICINE

## 2024-02-28 PROCEDURE — 2500000003 HC RX 250 WO HCPCS: Performed by: STUDENT IN AN ORGANIZED HEALTH CARE EDUCATION/TRAINING PROGRAM

## 2024-02-28 PROCEDURE — 2700000000 HC OXYGEN THERAPY PER DAY

## 2024-02-28 PROCEDURE — 6360000002 HC RX W HCPCS: Performed by: REGISTERED NURSE

## 2024-02-28 PROCEDURE — 84295 ASSAY OF SERUM SODIUM: CPT

## 2024-02-28 PROCEDURE — 2500000003 HC RX 250 WO HCPCS: Performed by: HOSPITALIST

## 2024-02-28 PROCEDURE — 94640 AIRWAY INHALATION TREATMENT: CPT

## 2024-02-28 PROCEDURE — 94761 N-INVAS EAR/PLS OXIMETRY MLT: CPT

## 2024-02-28 PROCEDURE — 2060000000 HC ICU INTERMEDIATE R&B

## 2024-02-28 PROCEDURE — 82803 BLOOD GASES ANY COMBINATION: CPT

## 2024-02-28 PROCEDURE — 97530 THERAPEUTIC ACTIVITIES: CPT

## 2024-02-28 PROCEDURE — 2580000003 HC RX 258: Performed by: HOSPITALIST

## 2024-02-28 PROCEDURE — 2580000003 HC RX 258: Performed by: INTERNAL MEDICINE

## 2024-02-28 PROCEDURE — 2580000003 HC RX 258: Performed by: REGISTERED NURSE

## 2024-02-28 PROCEDURE — 80048 BASIC METABOLIC PNL TOTAL CA: CPT

## 2024-02-28 RX ORDER — PANTOPRAZOLE SODIUM 40 MG/1
40 TABLET, DELAYED RELEASE ORAL
Status: DISCONTINUED | OUTPATIENT
Start: 2024-02-28 | End: 2024-02-29

## 2024-02-28 RX ADMIN — ANTACID TABLETS 500 MG: 500 TABLET, CHEWABLE ORAL at 10:49

## 2024-02-28 RX ADMIN — AMPICILLIN SODIUM AND SULBACTAM SODIUM 3000 MG: 2; 1 INJECTION, POWDER, FOR SOLUTION INTRAMUSCULAR; INTRAVENOUS at 22:21

## 2024-02-28 RX ADMIN — AMLODIPINE BESYLATE 10 MG: 10 TABLET ORAL at 08:03

## 2024-02-28 RX ADMIN — ALBUTEROL SULFATE 2.5 MG: 2.5 SOLUTION RESPIRATORY (INHALATION) at 22:28

## 2024-02-28 RX ADMIN — VANCOMYCIN HYDROCHLORIDE 1000 MG: 1 INJECTION, POWDER, LYOPHILIZED, FOR SOLUTION INTRAVENOUS at 17:29

## 2024-02-28 RX ADMIN — ZOLPIDEM TARTRATE 10 MG: 5 TABLET ORAL at 00:19

## 2024-02-28 RX ADMIN — SODIUM CHLORIDE, PRESERVATIVE FREE 10 ML: 5 INJECTION INTRAVENOUS at 21:58

## 2024-02-28 RX ADMIN — POTASSIUM CHLORIDE 20 MEQ: 29.8 INJECTION, SOLUTION INTRAVENOUS at 07:56

## 2024-02-28 RX ADMIN — CEFEPIME 2000 MG: 2 INJECTION, POWDER, FOR SOLUTION INTRAVENOUS at 07:54

## 2024-02-28 RX ADMIN — ZOLPIDEM TARTRATE 10 MG: 5 TABLET ORAL at 21:58

## 2024-02-28 RX ADMIN — VANCOMYCIN HYDROCHLORIDE 1000 MG: 1 INJECTION, POWDER, LYOPHILIZED, FOR SOLUTION INTRAVENOUS at 01:08

## 2024-02-28 RX ADMIN — DOXYCYCLINE 100 MG: 100 INJECTION, POWDER, LYOPHILIZED, FOR SOLUTION INTRAVENOUS at 09:19

## 2024-02-28 RX ADMIN — SODIUM PHOSPHATE, MONOBASIC, MONOHYDRATE AND SODIUM PHOSPHATE, DIBASIC, ANHYDROUS 13.29 MMOL: 142; 276 INJECTION, SOLUTION INTRAVENOUS at 10:27

## 2024-02-28 RX ADMIN — SODIUM CHLORIDE, PRESERVATIVE FREE 10 ML: 5 INJECTION INTRAVENOUS at 07:58

## 2024-02-28 RX ADMIN — PANTOPRAZOLE SODIUM 40 MG: 40 TABLET, DELAYED RELEASE ORAL at 05:47

## 2024-02-28 RX ADMIN — Medication 30 ML: at 09:48

## 2024-02-28 RX ADMIN — VANCOMYCIN HYDROCHLORIDE 1000 MG: 1 INJECTION, POWDER, LYOPHILIZED, FOR SOLUTION INTRAVENOUS at 10:28

## 2024-02-28 RX ADMIN — IOPAMIDOL 75 ML: 755 INJECTION, SOLUTION INTRAVENOUS at 11:45

## 2024-02-28 RX ADMIN — POTASSIUM CHLORIDE 20 MEQ: 29.8 INJECTION, SOLUTION INTRAVENOUS at 09:18

## 2024-02-28 RX ADMIN — CEFEPIME 2000 MG: 2 INJECTION, POWDER, FOR SOLUTION INTRAVENOUS at 17:17

## 2024-02-28 RX ADMIN — DOXYCYCLINE 100 MG: 100 INJECTION, POWDER, LYOPHILIZED, FOR SOLUTION INTRAVENOUS at 22:18

## 2024-02-28 RX ADMIN — VALSARTAN 320 MG: 160 TABLET ORAL at 08:03

## 2024-02-28 RX ADMIN — ONDANSETRON 4 MG: 2 INJECTION INTRAMUSCULAR; INTRAVENOUS at 00:14

## 2024-02-28 RX ADMIN — GUAIFENESIN SYRUP AND DEXTROMETHORPHAN 5 ML: 100; 10 SYRUP ORAL at 08:03

## 2024-02-28 RX ADMIN — PANTOPRAZOLE SODIUM 40 MG: 40 TABLET, DELAYED RELEASE ORAL at 17:27

## 2024-02-28 ASSESSMENT — PAIN SCALES - GENERAL
PAINLEVEL_OUTOF10: 0

## 2024-02-29 PROBLEM — Q87.40 MARFAN'S SYNDROME: Status: ACTIVE | Noted: 2024-02-29

## 2024-02-29 PROBLEM — A49.8 INFECTION DUE TO ACINETOBACTER SPECIES: Status: ACTIVE | Noted: 2024-02-29

## 2024-02-29 PROBLEM — J96.01 ACUTE RESPIRATORY FAILURE WITH HYPOXIA (HCC): Status: ACTIVE | Noted: 2024-02-29

## 2024-02-29 PROBLEM — R93.89 ABNORMAL CT OF THE CHEST: Status: ACTIVE | Noted: 2024-02-29

## 2024-02-29 LAB
ANION GAP SERPL CALCULATED.3IONS-SCNC: 13 MMOL/L (ref 3–16)
ANISOCYTOSIS BLD QL SMEAR: ABNORMAL
BASE EXCESS BLDV CALC-SCNC: -1.9 MMOL/L
BASOPHILS # BLD: 0 K/UL (ref 0–0.2)
BASOPHILS NFR BLD: 0.4 %
BUN SERPL-MCNC: 4 MG/DL (ref 7–20)
BURR CELLS BLD QL SMEAR: ABNORMAL
CALCIUM SERPL-MCNC: 8.2 MG/DL (ref 8.3–10.6)
CHLORIDE SERPL-SCNC: 97 MMOL/L (ref 99–110)
CO2 BLDV-SCNC: 24 MMOL/L
CO2 SERPL-SCNC: 20 MMOL/L (ref 21–32)
COHGB MFR BLDV: 1.1 %
CREAT SERPL-MCNC: 0.6 MG/DL (ref 0.9–1.3)
DEPRECATED RDW RBC AUTO: 16.5 % (ref 12.4–15.4)
EOSINOPHIL # BLD: 0.1 K/UL (ref 0–0.6)
EOSINOPHIL NFR BLD: 1.4 %
GFR SERPLBLD CREATININE-BSD FMLA CKD-EPI: >60 ML/MIN/{1.73_M2}
GLUCOSE SERPL-MCNC: 100 MG/DL (ref 70–99)
HCO3 BLDV-SCNC: 23 MMOL/L (ref 23–29)
HCT VFR BLD AUTO: 26.3 % (ref 40.5–52.5)
HGB BLD-MCNC: 9.4 G/DL (ref 13.5–17.5)
HYPOCHROMIA BLD QL SMEAR: ABNORMAL
LYMPHOCYTES # BLD: 1.5 K/UL (ref 1–5.1)
LYMPHOCYTES NFR BLD: 17.5 %
MCH RBC QN AUTO: 27.5 PG (ref 26–34)
MCHC RBC AUTO-ENTMCNC: 35.7 G/DL (ref 31–36)
MCV RBC AUTO: 77 FL (ref 80–100)
METHGB MFR BLDV: 0.5 %
MONOCYTES # BLD: 1.4 K/UL (ref 0–1.3)
MONOCYTES NFR BLD: 16.1 %
NEUTROPHILS # BLD: 5.6 K/UL (ref 1.7–7.7)
NEUTROPHILS NFR BLD: 64.6 %
NT-PROBNP SERPL-MCNC: 2613 PG/ML (ref 0–124)
O2 THERAPY: ABNORMAL
OVALOCYTES BLD QL SMEAR: ABNORMAL
PCO2 BLDV: 37.5 MMHG (ref 40–50)
PH BLDV: 7.39 [PH] (ref 7.35–7.45)
PLATELET # BLD AUTO: 137 K/UL (ref 135–450)
PLATELET BLD QL SMEAR: ADEQUATE
PMV BLD AUTO: 9.2 FL (ref 5–10.5)
PO2 BLDV: <30 MMHG
POIKILOCYTOSIS BLD QL SMEAR: ABNORMAL
POTASSIUM SERPL-SCNC: 3.5 MMOL/L (ref 3.5–5.1)
PROCALCITONIN SERPL IA-MCNC: 0.22 NG/ML (ref 0–0.15)
RBC # BLD AUTO: 3.42 M/UL (ref 4.2–5.9)
SAO2 % BLDV: 32 %
SLIDE REVIEW: ABNORMAL
SODIUM SERPL-SCNC: 130 MMOL/L (ref 136–145)
SODIUM SERPL-SCNC: 130 MMOL/L (ref 136–145)
WBC # BLD AUTO: 8.7 K/UL (ref 4–11)

## 2024-02-29 PROCEDURE — 6360000002 HC RX W HCPCS: Performed by: STUDENT IN AN ORGANIZED HEALTH CARE EDUCATION/TRAINING PROGRAM

## 2024-02-29 PROCEDURE — 36415 COLL VENOUS BLD VENIPUNCTURE: CPT

## 2024-02-29 PROCEDURE — 94660 CPAP INITIATION&MGMT: CPT

## 2024-02-29 PROCEDURE — 6360000002 HC RX W HCPCS: Performed by: INTERNAL MEDICINE

## 2024-02-29 PROCEDURE — 99223 1ST HOSP IP/OBS HIGH 75: CPT | Performed by: INTERNAL MEDICINE

## 2024-02-29 PROCEDURE — 6370000000 HC RX 637 (ALT 250 FOR IP): Performed by: STUDENT IN AN ORGANIZED HEALTH CARE EDUCATION/TRAINING PROGRAM

## 2024-02-29 PROCEDURE — 83880 ASSAY OF NATRIURETIC PEPTIDE: CPT

## 2024-02-29 PROCEDURE — 2580000003 HC RX 258: Performed by: HOSPITALIST

## 2024-02-29 PROCEDURE — 94761 N-INVAS EAR/PLS OXIMETRY MLT: CPT

## 2024-02-29 PROCEDURE — 2700000000 HC OXYGEN THERAPY PER DAY

## 2024-02-29 PROCEDURE — 85025 COMPLETE CBC W/AUTO DIFF WBC: CPT

## 2024-02-29 PROCEDURE — 2580000003 HC RX 258: Performed by: REGISTERED NURSE

## 2024-02-29 PROCEDURE — 84145 PROCALCITONIN (PCT): CPT

## 2024-02-29 PROCEDURE — 97110 THERAPEUTIC EXERCISES: CPT

## 2024-02-29 PROCEDURE — 2580000003 HC RX 258: Performed by: INTERNAL MEDICINE

## 2024-02-29 PROCEDURE — 84295 ASSAY OF SERUM SODIUM: CPT

## 2024-02-29 PROCEDURE — 2060000000 HC ICU INTERMEDIATE R&B

## 2024-02-29 PROCEDURE — 2580000003 HC RX 258

## 2024-02-29 PROCEDURE — 80048 BASIC METABOLIC PNL TOTAL CA: CPT

## 2024-02-29 PROCEDURE — 2500000003 HC RX 250 WO HCPCS: Performed by: HOSPITALIST

## 2024-02-29 PROCEDURE — 97530 THERAPEUTIC ACTIVITIES: CPT

## 2024-02-29 PROCEDURE — C9113 INJ PANTOPRAZOLE SODIUM, VIA: HCPCS | Performed by: STUDENT IN AN ORGANIZED HEALTH CARE EDUCATION/TRAINING PROGRAM

## 2024-02-29 PROCEDURE — 2580000003 HC RX 258: Performed by: STUDENT IN AN ORGANIZED HEALTH CARE EDUCATION/TRAINING PROGRAM

## 2024-02-29 PROCEDURE — 99233 SBSQ HOSP IP/OBS HIGH 50: CPT | Performed by: INTERNAL MEDICINE

## 2024-02-29 RX ORDER — FUROSEMIDE 10 MG/ML
40 INJECTION INTRAMUSCULAR; INTRAVENOUS 2 TIMES DAILY
Status: DISCONTINUED | OUTPATIENT
Start: 2024-02-29 | End: 2024-03-05 | Stop reason: HOSPADM

## 2024-02-29 RX ORDER — ENOXAPARIN SODIUM 100 MG/ML
40 INJECTION SUBCUTANEOUS NIGHTLY
Status: DISCONTINUED | OUTPATIENT
Start: 2024-02-29 | End: 2024-03-05 | Stop reason: HOSPADM

## 2024-02-29 RX ORDER — METHYLPREDNISOLONE SODIUM SUCCINATE 40 MG/ML
40 INJECTION, POWDER, LYOPHILIZED, FOR SOLUTION INTRAMUSCULAR; INTRAVENOUS DAILY
Status: DISCONTINUED | OUTPATIENT
Start: 2024-02-29 | End: 2024-03-03

## 2024-02-29 RX ORDER — WATER 10 ML/10ML
INJECTION INTRAMUSCULAR; INTRAVENOUS; SUBCUTANEOUS
Status: COMPLETED
Start: 2024-02-29 | End: 2024-02-29

## 2024-02-29 RX ORDER — CLOTRIMAZOLE 1 %
CREAM (GRAM) TOPICAL 2 TIMES DAILY
Status: DISCONTINUED | OUTPATIENT
Start: 2024-02-29 | End: 2024-03-05 | Stop reason: HOSPADM

## 2024-02-29 RX ADMIN — CLOTRIMAZOLE: 10 CREAM TOPICAL at 21:01

## 2024-02-29 RX ADMIN — SODIUM CHLORIDE, PRESERVATIVE FREE 10 ML: 5 INJECTION INTRAVENOUS at 20:12

## 2024-02-29 RX ADMIN — AMPICILLIN SODIUM AND SULBACTAM SODIUM 3000 MG: 2; 1 INJECTION, POWDER, FOR SOLUTION INTRAMUSCULAR; INTRAVENOUS at 08:30

## 2024-02-29 RX ADMIN — WATER 10 ML: 1 INJECTION INTRAMUSCULAR; INTRAVENOUS; SUBCUTANEOUS at 16:03

## 2024-02-29 RX ADMIN — VALSARTAN 320 MG: 160 TABLET ORAL at 08:41

## 2024-02-29 RX ADMIN — PANTOPRAZOLE SODIUM 40 MG: 40 INJECTION, POWDER, FOR SOLUTION INTRAVENOUS at 16:11

## 2024-02-29 RX ADMIN — FUROSEMIDE 40 MG: 10 INJECTION, SOLUTION INTRAMUSCULAR; INTRAVENOUS at 17:33

## 2024-02-29 RX ADMIN — AMPICILLIN SODIUM AND SULBACTAM SODIUM 3000 MG: 2; 1 INJECTION, POWDER, FOR SOLUTION INTRAMUSCULAR; INTRAVENOUS at 13:53

## 2024-02-29 RX ADMIN — AMPICILLIN SODIUM AND SULBACTAM SODIUM 3000 MG: 2; 1 INJECTION, POWDER, FOR SOLUTION INTRAMUSCULAR; INTRAVENOUS at 20:21

## 2024-02-29 RX ADMIN — PANTOPRAZOLE SODIUM 40 MG: 40 TABLET, DELAYED RELEASE ORAL at 05:01

## 2024-02-29 RX ADMIN — CLOTRIMAZOLE: 10 CREAM TOPICAL at 15:46

## 2024-02-29 RX ADMIN — ENOXAPARIN SODIUM 40 MG: 100 INJECTION SUBCUTANEOUS at 21:17

## 2024-02-29 RX ADMIN — SODIUM CHLORIDE: 9 INJECTION, SOLUTION INTRAVENOUS at 20:20

## 2024-02-29 RX ADMIN — SODIUM CHLORIDE, PRESERVATIVE FREE 40 ML: 5 INJECTION INTRAVENOUS at 08:25

## 2024-02-29 RX ADMIN — METHYLPREDNISOLONE SODIUM SUCCINATE 40 MG: 40 INJECTION INTRAMUSCULAR; INTRAVENOUS at 16:03

## 2024-02-29 RX ADMIN — DOXYCYCLINE 100 MG: 100 INJECTION, POWDER, LYOPHILIZED, FOR SOLUTION INTRAVENOUS at 21:17

## 2024-02-29 RX ADMIN — AMPICILLIN SODIUM AND SULBACTAM SODIUM 3000 MG: 2; 1 INJECTION, POWDER, FOR SOLUTION INTRAMUSCULAR; INTRAVENOUS at 02:30

## 2024-02-29 RX ADMIN — AMLODIPINE BESYLATE 10 MG: 10 TABLET ORAL at 08:24

## 2024-02-29 RX ADMIN — FUROSEMIDE 40 MG: 10 INJECTION, SOLUTION INTRAMUSCULAR; INTRAVENOUS at 12:04

## 2024-02-29 RX ADMIN — DOXYCYCLINE 100 MG: 100 INJECTION, POWDER, LYOPHILIZED, FOR SOLUTION INTRAVENOUS at 08:34

## 2024-03-01 LAB
ANION GAP SERPL CALCULATED.3IONS-SCNC: 12 MMOL/L (ref 3–16)
BACTERIA BLD CULT ORG #2: ABNORMAL
BACTERIA BLD CULT ORG #2: NORMAL
BACTERIA BLD CULT: ABNORMAL
BACTERIA BLD CULT: ABNORMAL
BACTERIA BLD CULT: NORMAL
BASOPHILS # BLD: 0 K/UL (ref 0–0.2)
BASOPHILS NFR BLD: 0.2 %
BUN SERPL-MCNC: 9 MG/DL (ref 7–20)
BURR CELLS BLD QL SMEAR: ABNORMAL
CALCIUM SERPL-MCNC: 8.5 MG/DL (ref 8.3–10.6)
CHLORIDE SERPL-SCNC: 96 MMOL/L (ref 99–110)
CO2 SERPL-SCNC: 23 MMOL/L (ref 21–32)
CREAT SERPL-MCNC: 0.9 MG/DL (ref 0.9–1.3)
DACRYOCYTES BLD QL SMEAR: ABNORMAL
DEPRECATED RDW RBC AUTO: 17 % (ref 12.4–15.4)
EOSINOPHIL # BLD: 0 K/UL (ref 0–0.6)
EOSINOPHIL NFR BLD: 0 %
GFR SERPLBLD CREATININE-BSD FMLA CKD-EPI: >60 ML/MIN/{1.73_M2}
GLUCOSE SERPL-MCNC: 115 MG/DL (ref 70–99)
HCT VFR BLD AUTO: 29.7 % (ref 40.5–52.5)
HGB BLD-MCNC: 10.4 G/DL (ref 13.5–17.5)
LYMPHOCYTES # BLD: 1.5 K/UL (ref 1–5.1)
LYMPHOCYTES NFR BLD: 13.3 %
Lab: NORMAL
MCH RBC QN AUTO: 27.3 PG (ref 26–34)
MCHC RBC AUTO-ENTMCNC: 34.8 G/DL (ref 31–36)
MCV RBC AUTO: 78.5 FL (ref 80–100)
MONOCYTES # BLD: 1.2 K/UL (ref 0–1.3)
MONOCYTES NFR BLD: 10.9 %
NEUTROPHILS # BLD: 8.4 K/UL (ref 1.7–7.7)
NEUTROPHILS NFR BLD: 75.6 %
ORGANISM: ABNORMAL
ORGANISM: ABNORMAL
OVALOCYTES BLD QL SMEAR: ABNORMAL
PLATELET # BLD AUTO: 185 K/UL (ref 135–450)
PLATELET BLD QL SMEAR: ADEQUATE
PMV BLD AUTO: 9.4 FL (ref 5–10.5)
POTASSIUM SERPL-SCNC: 4 MMOL/L (ref 3.5–5.1)
RBC # BLD AUTO: 3.79 M/UL (ref 4.2–5.9)
REPORT: NORMAL
SCHISTOCYTES BLD QL SMEAR: ABNORMAL
SLIDE REVIEW: ABNORMAL
SODIUM SERPL-SCNC: 131 MMOL/L (ref 136–145)
SODIUM SERPL-SCNC: 133 MMOL/L (ref 136–145)
THIS TEST SENT TO: NORMAL
WBC # BLD AUTO: 11.1 K/UL (ref 4–11)

## 2024-03-01 PROCEDURE — 2700000000 HC OXYGEN THERAPY PER DAY

## 2024-03-01 PROCEDURE — 99232 SBSQ HOSP IP/OBS MODERATE 35: CPT | Performed by: INTERNAL MEDICINE

## 2024-03-01 PROCEDURE — 2060000000 HC ICU INTERMEDIATE R&B

## 2024-03-01 PROCEDURE — 36415 COLL VENOUS BLD VENIPUNCTURE: CPT

## 2024-03-01 PROCEDURE — 85025 COMPLETE CBC W/AUTO DIFF WBC: CPT

## 2024-03-01 PROCEDURE — 2500000003 HC RX 250 WO HCPCS: Performed by: STUDENT IN AN ORGANIZED HEALTH CARE EDUCATION/TRAINING PROGRAM

## 2024-03-01 PROCEDURE — C9113 INJ PANTOPRAZOLE SODIUM, VIA: HCPCS | Performed by: STUDENT IN AN ORGANIZED HEALTH CARE EDUCATION/TRAINING PROGRAM

## 2024-03-01 PROCEDURE — 6370000000 HC RX 637 (ALT 250 FOR IP): Performed by: STUDENT IN AN ORGANIZED HEALTH CARE EDUCATION/TRAINING PROGRAM

## 2024-03-01 PROCEDURE — 80048 BASIC METABOLIC PNL TOTAL CA: CPT

## 2024-03-01 PROCEDURE — 6360000002 HC RX W HCPCS: Performed by: INTERNAL MEDICINE

## 2024-03-01 PROCEDURE — 99233 SBSQ HOSP IP/OBS HIGH 50: CPT | Performed by: INTERNAL MEDICINE

## 2024-03-01 PROCEDURE — 97110 THERAPEUTIC EXERCISES: CPT

## 2024-03-01 PROCEDURE — 2580000003 HC RX 258: Performed by: STUDENT IN AN ORGANIZED HEALTH CARE EDUCATION/TRAINING PROGRAM

## 2024-03-01 PROCEDURE — 97530 THERAPEUTIC ACTIVITIES: CPT

## 2024-03-01 PROCEDURE — 92610 EVALUATE SWALLOWING FUNCTION: CPT

## 2024-03-01 PROCEDURE — 2580000003 HC RX 258

## 2024-03-01 PROCEDURE — 51798 US URINE CAPACITY MEASURE: CPT

## 2024-03-01 PROCEDURE — 2580000003 HC RX 258: Performed by: REGISTERED NURSE

## 2024-03-01 PROCEDURE — 2580000003 HC RX 258: Performed by: HOSPITALIST

## 2024-03-01 PROCEDURE — 6360000002 HC RX W HCPCS: Performed by: STUDENT IN AN ORGANIZED HEALTH CARE EDUCATION/TRAINING PROGRAM

## 2024-03-01 PROCEDURE — 2580000003 HC RX 258: Performed by: INTERNAL MEDICINE

## 2024-03-01 PROCEDURE — 84295 ASSAY OF SERUM SODIUM: CPT

## 2024-03-01 PROCEDURE — 94761 N-INVAS EAR/PLS OXIMETRY MLT: CPT

## 2024-03-01 RX ORDER — WATER 10 ML/10ML
INJECTION INTRAMUSCULAR; INTRAVENOUS; SUBCUTANEOUS
Status: COMPLETED
Start: 2024-03-01 | End: 2024-03-01

## 2024-03-01 RX ADMIN — SODIUM CHLORIDE: 9 INJECTION, SOLUTION INTRAVENOUS at 02:44

## 2024-03-01 RX ADMIN — AMPICILLIN SODIUM AND SULBACTAM SODIUM 3000 MG: 2; 1 INJECTION, POWDER, FOR SOLUTION INTRAMUSCULAR; INTRAVENOUS at 20:34

## 2024-03-01 RX ADMIN — ALTEPLASE 1 MG: 2.2 INJECTION, POWDER, LYOPHILIZED, FOR SOLUTION INTRAVENOUS at 01:04

## 2024-03-01 RX ADMIN — PANTOPRAZOLE SODIUM 40 MG: 40 INJECTION, POWDER, FOR SOLUTION INTRAVENOUS at 05:46

## 2024-03-01 RX ADMIN — WATER 10 ML: 1 INJECTION INTRAMUSCULAR; INTRAVENOUS; SUBCUTANEOUS at 01:04

## 2024-03-01 RX ADMIN — METHYLPREDNISOLONE SODIUM SUCCINATE 40 MG: 40 INJECTION INTRAMUSCULAR; INTRAVENOUS at 08:33

## 2024-03-01 RX ADMIN — AMPICILLIN SODIUM AND SULBACTAM SODIUM 3000 MG: 2; 1 INJECTION, POWDER, FOR SOLUTION INTRAMUSCULAR; INTRAVENOUS at 08:31

## 2024-03-01 RX ADMIN — ENOXAPARIN SODIUM 40 MG: 100 INJECTION SUBCUTANEOUS at 20:36

## 2024-03-01 RX ADMIN — CLOTRIMAZOLE: 10 CREAM TOPICAL at 20:38

## 2024-03-01 RX ADMIN — AMPICILLIN SODIUM AND SULBACTAM SODIUM 3000 MG: 2; 1 INJECTION, POWDER, FOR SOLUTION INTRAMUSCULAR; INTRAVENOUS at 12:58

## 2024-03-01 RX ADMIN — AMPICILLIN SODIUM AND SULBACTAM SODIUM 3000 MG: 2; 1 INJECTION, POWDER, FOR SOLUTION INTRAMUSCULAR; INTRAVENOUS at 02:45

## 2024-03-01 RX ADMIN — SODIUM CHLORIDE, PRESERVATIVE FREE 10 ML: 5 INJECTION INTRAVENOUS at 08:34

## 2024-03-01 RX ADMIN — DOXYCYCLINE 100 MG: 100 INJECTION, POWDER, LYOPHILIZED, FOR SOLUTION INTRAVENOUS at 22:38

## 2024-03-01 RX ADMIN — DOXYCYCLINE 100 MG: 100 INJECTION, POWDER, LYOPHILIZED, FOR SOLUTION INTRAVENOUS at 10:16

## 2024-03-01 RX ADMIN — FUROSEMIDE 40 MG: 10 INJECTION, SOLUTION INTRAMUSCULAR; INTRAVENOUS at 08:34

## 2024-03-01 RX ADMIN — PANTOPRAZOLE SODIUM 40 MG: 40 INJECTION, POWDER, FOR SOLUTION INTRAVENOUS at 17:22

## 2024-03-01 RX ADMIN — CLOTRIMAZOLE: 10 CREAM TOPICAL at 08:38

## 2024-03-01 RX ADMIN — FUROSEMIDE 40 MG: 10 INJECTION, SOLUTION INTRAMUSCULAR; INTRAVENOUS at 17:23

## 2024-03-01 NOTE — CONSULTS
Pulmonary Consult Note     Patient's name:  Adama Tello  Medical Record Number: [id number]  Patient's account/billing number: [id number]  Patient's YOB: 1970  Age: 54 y.o.  Date of Admission: 2/22/2024  7:05 PM  Date of Consult: 2/29/2024      Primary Care Physician: No primary care provider on file.      Code Status: Full Code    Reason for consult: Acute respiratory with hypoxia    Assessment and Plan     Acute respiratory failure with hypoxia less than 90% on room air  Extensive airspace disease/groundglass opacities bilaterally upper lobe predominant  Chalasia with fluid-filled esophagus  Bacteremia actinobacter  Hyponatremia      Plan:  Has hypoxic respiratory failure secondary to diffuse bilateral airspace disease upper lobe predominant differential diagnosis include aspiration (patient is high risk with documented achalasia) recommend to keep patient n.p.o. for now, consider GI consult  Differential diagnosis include pulmonary edema, agree with IV Lasix, additionally ARDS secondary to infection is one of the differential diagnosis.  Antibiotics per ID  Solu-Medrol daily  Aspiration precautions  Titrate oxygen to keep sats more than 90%    HISTORY OF PRESENT ILLNESS:   Mr./Ms. Adama Tello is a 54 y.o. gentleman with past medical history stated below significant for Marfan syndrome, achalasia, hypertension, history of traumatic brain injury presented to the hospital with altered mental status, initially admitted to the ICU with hyponatremia for which she has been receiving treatment, patient also was febrile and diagnosed with pneumonia for which he has been receiving antibiotics, we were consulted for worsening hypoxic respiratory failure  His CT scan of the chest which I personally reviewed showed diffuse bilateral groundglass opacities upper lobe predominant.        Past Medical History:        Diagnosis Date    Esophageal achalasia     GERD 
                  [facility]          [address] [place] [address]                              CONSULTATION      PATIENT NAME: ERIC BECKER                   : 1970  MED REC NO: [id number]                      ROOM: [place] [address]  ACCOUNT NO: [id number]                       ADMIT DATE: 2024  PROVIDER: Neisha Wade MD      REASON FOR CONSULTATION:  Hyponatremia.    HISTORY OF PRESENTING ILLNESS:  He is a 53-year-old Afro-American male with past medical history significant for hypertension, depression, traumatic brain injury, brought to ER after family found him confused, disoriented.  Apparently, the patient fell and family noticed foaming from his mouth.  911 was called and EMT found the patient unresponsive.  At the time of presentation to the emergency room, the patient was confused, disoriented.  Routine labs showed sodium of 105, and the patient was admitted for further workup and management.  A Renal consultation was called with above-mentioned reason.  The patient received 100 cc of 3% saline and admitted in ICU for continuous infusion.  At the time of consultation, the patient is alert, awake, and oriented x2, which is much better mental status from last night.  Early morning, sodium was 115 and the patient was taken off the 3% hypertonic saline.    PAST MEDICAL HISTORY:    1. Traumatic brain injury.  2. Hypertension.  3. GERD.  4. Marfan syndrome.    OUTPATIENT MEDICATIONS:  Include Desyrel, Thorazine, Norvasc, Protonix, Folvite, thiamine, magnesium, potassium, Diovan, and melatonin.    PAST SURGICAL HISTORY:    1. Status post endoscopy.  2. Status post aortic root replacement.  3. Status post craniotomy.    ALLERGIES:  NONE.      FAMILY HISTORY:  Unobtainable.    REVIEW OF SYSTEMS:  Unobtainable.    PHYSICAL EXAMINATION:  GENERAL:  Lying in bed, looks comfortable.  VITAL SIGNS:  Blood pressure 117/78, pulse 103, temperature 100.8.  HEENT:  Pupils are reactive 
  [facility]  Palliative Care   Consult Note    NAME:  Adama Tello  MEDICAL RECORD NUMBER:  [id number]  AGE: 53 y.o.   GENDER: male  : 1970  TODAY'S DATE:  2024    Subjective     Reason for Consult:  goals of care  Visit Type: Initial Consult      Adama Tello is a 53 y.o. male referred by:   [x] Physician    PAST MEDICAL HISTORY      Diagnosis Date    Esophageal achalasia     GERD (gastroesophageal reflux disease)     Hypertension     Hypertension     Marfan syndrome     Traumatic brain injury (HCC)        PAST SURGICAL HISTORY  Past Surgical History:   Procedure Laterality Date    AORTIC ROOT REPLACEMENT      CRANIOTOMY      UPPER GASTROINTESTINAL ENDOSCOPY N/A 2023    EGD DILATION BALLOON performed by Ryan Tracy MD at [facility] ASC ENDOSCOPY       FAMILY HISTORY  No family history on file.    SOCIAL HISTORY  Social History     Tobacco Use    Smoking status: Every Day     Current packs/day: 0.25     Average packs/day: 0.3 packs/day for 5.0 years (1.3 ttl pk-yrs)     Types: Cigarettes    Smokeless tobacco: Never   Substance Use Topics    Alcohol use: Not Currently    Drug use: Not Currently       ALLERGIES  No Known Allergies    MEDICATIONS  No current facility-administered medications on file prior to encounter.     Current Outpatient Medications on File Prior to Encounter   Medication Sig Dispense Refill    amLODIPine (NORVASC) 10 MG tablet Take 1 tablet by mouth daily      pantoprazole (PROTONIX) 20 MG tablet Take 1 tablet by mouth in the morning and at bedtime      zolpidem (AMBIEN) 10 MG tablet Take 1 tablet by mouth nightly as needed for Sleep. Max Daily Amount: 10 mg      chlorproMAZINE (THORAZINE) 10 MG tablet Take 1 tablet by mouth 4 times daily as needed      metoclopramide (REGLAN) 10 MG tablet Take 1 tablet by mouth 4 times daily      sucralfate (CARAFATE) 1 GM/10ML suspension Take 10 mLs by mouth 4 times daily (before meals and nightly)      vitamin B-12 (CYANOCOBALAMIN) 1000 
Consult note completed  
Social work consult noted. Patient from home with mother prior to medical admission. At time of initial case management assessment, patient was resting quietly in bed with eyes closed, RR WDL. This RN CM spoke with patient's mother/emergency contact (Gaby) at bedside.      Gaby states that patient has a hx of ETOH abuse and has generally stopped taking care of himself. As a result, he has moved in with her in the past several months. Gaby is very interested in what resources would be available to help her son get back on his feet.     This RN CM placed referral to Nilda Cobb with [facility] on behalf of patient's mother. CM to continue to follow and discharge plan with the patient, as appropriate. Patient will likely return home when medically stable for discharge.    Electronically signed by DONELL NUÑEZ RN on 2/23/2024 at 3:41 PM    
Date    AORTIC ROOT REPLACEMENT      CRANIOTOMY      UPPER GASTROINTESTINAL ENDOSCOPY N/A 06/08/2023    EGD DILATION BALLOON performed by Ryan Tracy MD at [facility] ASC ENDOSCOPY     No family history on file.  Social History     Socioeconomic History    Marital status: Single   Tobacco Use    Smoking status: Every Day     Current packs/day: 0.25     Average packs/day: 0.3 packs/day for 5.0 years (1.3 ttl pk-yrs)     Types: Cigarettes    Smokeless tobacco: Never   Substance and Sexual Activity    Alcohol use: Not Currently    Drug use: Not Currently     Social Determinants of Health     Food Insecurity: Patient Unable To Answer (2/23/2024)    Hunger Vital Sign     Worried About Running Out of Food in the Last Year: Patient unable to answer     Ran Out of Food in the Last Year: Patient unable to answer   Transportation Needs: Patient Unable To Answer (2/23/2024)    PRAPARE - Transportation     Lack of Transportation (Medical): Patient unable to answer     Lack of Transportation (Non-Medical): Patient unable to answer   Housing Stability: Unknown (2/23/2024)    Housing Stability Vital Sign     Unable to Pay for Housing in the Last Year: Patient unable to answer       MEDICATIONS   SCHEDULED:  sodium chloride flush, 5-40 mL, 2 times per day  sodium chloride flush, 5-40 mL, 2 times per day  pantoprazole, 80 mg, Once  cefepime, 2,000 mg, Q8H  doxycycline (VIBRAMYCIN) IV, 100 mg, Q12H      FLUIDS/DRIPS:     sodium chloride Stopped (02/23/24 0557)    sodium chloride      pantoprazole      3% sodium chloride 30 mL/hr (02/23/24 0317)     PRNs: sodium chloride flush, 5-40 mL, PRN  sodium chloride, , PRN  magnesium sulfate, 2,000 mg, PRN  ondansetron, 4 mg, Q8H PRN   Or  ondansetron, 4 mg, Q6H PRN  polyethylene glycol, 17 g, Daily PRN  acetaminophen, 650 mg, Q6H PRN   Or  acetaminophen, 650 mg, Q6H PRN  albuterol, 2.5 mg, Q2H PRN  sodium chloride flush, 5-40 mL, PRN  sodium chloride, 25 mL, PRN  potassium chloride, 20 mEq, PRN   
E83.42       4. Pneumonia due to infectious organism, unspecified laterality, unspecified part of lung  J18.9       5. Thrombocytopenia (HCC)  D69.6         Admitted with change in mental status  Severe hyponatremia  Hypomagnesemia  Hypokalemia  Change in mental status  History of traumatic brain injury  History of craniotomy  Fevers on admission  Blood culture now positive for gram variable rods  Chest x-ray bibasilar infiltrates  History of intractable hiccups requiring for,phrenic nerve block  History of aortic root replacement surgery 2010    Given the high grade bacteremia this a true infection Aerobic GPR variable not detected on [employer] will check with Micro he has h/o Aortic root surgery and was valve sparing per record so concern would be for endovascular infection will add IV Vancomycin for now and possible could be Corynebacterium other DDx Listeria or Lactobacillus will check with the lab     Labs, Microbiology, Radiology and pertinent results from current hospitalization and care every where were reviewed by me as a part of the consultation.    PLAN :  Add IV Vancomycin  1 gm Q 12 hr  Cont IV Cefepime  2 gm Q 12 hr   Cont Doxycycline 100 mg Q 12 hr  Check Blood cx repeat  TTE  May need CT chest with contrast  May need WBC nuclear scan  Will d/w Micro lab   Nephrology notes reviewed         Discussed with patient/Family and Nursing     Medical Decision Making:  The following items were considered in medical decision making:  Discussion of patient care with other providers  Reviewed clinical lab tests  Reviewed radiology tests  Reviewed other diagnostic tests/interventions  Independent review of radiologic images  Independent review of  Microbiology cultures and other micro tests reviewed     Risk of Complications/Morbidity: High      Illness(es)/ Infection present that pose threat to bodily function.   There is potential for severe exacerbation of infection/side effects of treatment.  Therapy requires

## 2024-03-02 LAB
ANION GAP SERPL CALCULATED.3IONS-SCNC: 12 MMOL/L (ref 3–16)
ANISOCYTOSIS BLD QL SMEAR: ABNORMAL
BASOPHILS # BLD: 0 K/UL (ref 0–0.2)
BASOPHILS NFR BLD: 0 %
BUN SERPL-MCNC: 21 MG/DL (ref 7–20)
CALCIUM SERPL-MCNC: 8.8 MG/DL (ref 8.3–10.6)
CHLORIDE SERPL-SCNC: 99 MMOL/L (ref 99–110)
CO2 SERPL-SCNC: 27 MMOL/L (ref 21–32)
CREAT SERPL-MCNC: 1.1 MG/DL (ref 0.9–1.3)
DACRYOCYTES BLD QL SMEAR: ABNORMAL
DEPRECATED RDW RBC AUTO: 17 % (ref 12.4–15.4)
EOSINOPHIL # BLD: 0 K/UL (ref 0–0.6)
EOSINOPHIL NFR BLD: 0 %
GFR SERPLBLD CREATININE-BSD FMLA CKD-EPI: >60 ML/MIN/{1.73_M2}
GLUCOSE SERPL-MCNC: 110 MG/DL (ref 70–99)
HCT VFR BLD AUTO: 26.1 % (ref 40.5–52.5)
HGB BLD-MCNC: 9.3 G/DL (ref 13.5–17.5)
LYMPHOCYTES # BLD: 1.7 K/UL (ref 1–5.1)
LYMPHOCYTES NFR BLD: 15 %
MACROCYTES BLD QL SMEAR: ABNORMAL
MCH RBC QN AUTO: 27.8 PG (ref 26–34)
MCHC RBC AUTO-ENTMCNC: 35.5 G/DL (ref 31–36)
MCV RBC AUTO: 78.4 FL (ref 80–100)
MONOCYTES # BLD: 0.5 K/UL (ref 0–1.3)
MONOCYTES NFR BLD: 4 %
MYELOCYTES NFR BLD MANUAL: 1 %
NEUTROPHILS # BLD: 9.3 K/UL (ref 1.7–7.7)
NEUTROPHILS NFR BLD: 79 %
NEUTS BAND NFR BLD MANUAL: 1 % (ref 0–7)
OVALOCYTES BLD QL SMEAR: ABNORMAL
PHOSPHATE SERPL-MCNC: 3.1 MG/DL (ref 2.5–4.9)
PLATELET # BLD AUTO: 259 K/UL (ref 135–450)
PLATELET BLD QL SMEAR: ADEQUATE
PMV BLD AUTO: 9.4 FL (ref 5–10.5)
POIKILOCYTOSIS BLD QL SMEAR: ABNORMAL
POLYCHROMASIA BLD QL SMEAR: ABNORMAL
POTASSIUM SERPL-SCNC: 3.7 MMOL/L (ref 3.5–5.1)
RBC # BLD AUTO: 3.33 M/UL (ref 4.2–5.9)
SLIDE REVIEW: ABNORMAL
SODIUM SERPL-SCNC: 138 MMOL/L (ref 136–145)
WBC # BLD AUTO: 11.5 K/UL (ref 4–11)

## 2024-03-02 PROCEDURE — 2700000000 HC OXYGEN THERAPY PER DAY

## 2024-03-02 PROCEDURE — 6360000002 HC RX W HCPCS: Performed by: STUDENT IN AN ORGANIZED HEALTH CARE EDUCATION/TRAINING PROGRAM

## 2024-03-02 PROCEDURE — 2580000003 HC RX 258: Performed by: INTERNAL MEDICINE

## 2024-03-02 PROCEDURE — 94760 N-INVAS EAR/PLS OXIMETRY 1: CPT

## 2024-03-02 PROCEDURE — 6360000002 HC RX W HCPCS: Performed by: INTERNAL MEDICINE

## 2024-03-02 PROCEDURE — 2060000000 HC ICU INTERMEDIATE R&B

## 2024-03-02 PROCEDURE — 80048 BASIC METABOLIC PNL TOTAL CA: CPT

## 2024-03-02 PROCEDURE — 6370000000 HC RX 637 (ALT 250 FOR IP): Performed by: HOSPITALIST

## 2024-03-02 PROCEDURE — 2580000003 HC RX 258: Performed by: REGISTERED NURSE

## 2024-03-02 PROCEDURE — 84100 ASSAY OF PHOSPHORUS: CPT

## 2024-03-02 PROCEDURE — 2500000003 HC RX 250 WO HCPCS: Performed by: STUDENT IN AN ORGANIZED HEALTH CARE EDUCATION/TRAINING PROGRAM

## 2024-03-02 PROCEDURE — C9113 INJ PANTOPRAZOLE SODIUM, VIA: HCPCS | Performed by: STUDENT IN AN ORGANIZED HEALTH CARE EDUCATION/TRAINING PROGRAM

## 2024-03-02 PROCEDURE — 2580000003 HC RX 258: Performed by: STUDENT IN AN ORGANIZED HEALTH CARE EDUCATION/TRAINING PROGRAM

## 2024-03-02 PROCEDURE — 36415 COLL VENOUS BLD VENIPUNCTURE: CPT

## 2024-03-02 PROCEDURE — 99232 SBSQ HOSP IP/OBS MODERATE 35: CPT | Performed by: INTERNAL MEDICINE

## 2024-03-02 PROCEDURE — 85025 COMPLETE CBC W/AUTO DIFF WBC: CPT

## 2024-03-02 RX ADMIN — DOXYCYCLINE 100 MG: 100 INJECTION, POWDER, LYOPHILIZED, FOR SOLUTION INTRAVENOUS at 21:28

## 2024-03-02 RX ADMIN — PANTOPRAZOLE SODIUM 40 MG: 40 INJECTION, POWDER, FOR SOLUTION INTRAVENOUS at 17:38

## 2024-03-02 RX ADMIN — AMPICILLIN SODIUM AND SULBACTAM SODIUM 3000 MG: 2; 1 INJECTION, POWDER, FOR SOLUTION INTRAMUSCULAR; INTRAVENOUS at 09:06

## 2024-03-02 RX ADMIN — PANTOPRAZOLE SODIUM 40 MG: 40 INJECTION, POWDER, FOR SOLUTION INTRAVENOUS at 06:02

## 2024-03-02 RX ADMIN — CLOTRIMAZOLE: 10 CREAM TOPICAL at 09:07

## 2024-03-02 RX ADMIN — METHYLPREDNISOLONE SODIUM SUCCINATE 40 MG: 40 INJECTION INTRAMUSCULAR; INTRAVENOUS at 09:07

## 2024-03-02 RX ADMIN — AMPICILLIN SODIUM AND SULBACTAM SODIUM 3000 MG: 2; 1 INJECTION, POWDER, FOR SOLUTION INTRAMUSCULAR; INTRAVENOUS at 21:23

## 2024-03-02 RX ADMIN — AMPICILLIN SODIUM AND SULBACTAM SODIUM 3000 MG: 2; 1 INJECTION, POWDER, FOR SOLUTION INTRAMUSCULAR; INTRAVENOUS at 15:58

## 2024-03-02 RX ADMIN — CLOTRIMAZOLE: 10 CREAM TOPICAL at 21:20

## 2024-03-02 RX ADMIN — DOXYCYCLINE 100 MG: 100 INJECTION, POWDER, LYOPHILIZED, FOR SOLUTION INTRAVENOUS at 11:18

## 2024-03-02 RX ADMIN — ENOXAPARIN SODIUM 40 MG: 100 INJECTION SUBCUTANEOUS at 21:20

## 2024-03-02 RX ADMIN — ACETAMINOPHEN 650 MG: 325 TABLET ORAL at 23:34

## 2024-03-02 RX ADMIN — SODIUM CHLORIDE, PRESERVATIVE FREE 10 ML: 5 INJECTION INTRAVENOUS at 09:07

## 2024-03-02 RX ADMIN — AMPICILLIN SODIUM AND SULBACTAM SODIUM 3000 MG: 2; 1 INJECTION, POWDER, FOR SOLUTION INTRAMUSCULAR; INTRAVENOUS at 02:26

## 2024-03-02 ASSESSMENT — PAIN DESCRIPTION - ORIENTATION: ORIENTATION: RIGHT;LEFT

## 2024-03-02 ASSESSMENT — PAIN DESCRIPTION - LOCATION: LOCATION: FOOT

## 2024-03-02 ASSESSMENT — PAIN SCALES - GENERAL
PAINLEVEL_OUTOF10: 0
PAINLEVEL_OUTOF10: 3

## 2024-03-02 ASSESSMENT — PAIN DESCRIPTION - DESCRIPTORS: DESCRIPTORS: DISCOMFORT

## 2024-03-02 ASSESSMENT — PAIN - FUNCTIONAL ASSESSMENT: PAIN_FUNCTIONAL_ASSESSMENT: ACTIVITIES ARE NOT PREVENTED

## 2024-03-03 LAB
ANION GAP SERPL CALCULATED.3IONS-SCNC: 11 MMOL/L (ref 3–16)
BASOPHILS # BLD: 0 K/UL (ref 0–0.2)
BASOPHILS NFR BLD: 0.1 %
BUN SERPL-MCNC: 21 MG/DL (ref 7–20)
CALCIUM SERPL-MCNC: 8.6 MG/DL (ref 8.3–10.6)
CHLORIDE SERPL-SCNC: 101 MMOL/L (ref 99–110)
CO2 SERPL-SCNC: 24 MMOL/L (ref 21–32)
CREAT SERPL-MCNC: 0.8 MG/DL (ref 0.9–1.3)
DEPRECATED RDW RBC AUTO: 17.1 % (ref 12.4–15.4)
EOSINOPHIL # BLD: 0 K/UL (ref 0–0.6)
EOSINOPHIL NFR BLD: 0.1 %
GFR SERPLBLD CREATININE-BSD FMLA CKD-EPI: >60 ML/MIN/{1.73_M2}
GLUCOSE SERPL-MCNC: 95 MG/DL (ref 70–99)
HCT VFR BLD AUTO: 27.5 % (ref 40.5–52.5)
HGB BLD-MCNC: 9.4 G/DL (ref 13.5–17.5)
LYMPHOCYTES # BLD: 1.8 K/UL (ref 1–5.1)
LYMPHOCYTES NFR BLD: 20.2 %
MCH RBC QN AUTO: 27.1 PG (ref 26–34)
MCHC RBC AUTO-ENTMCNC: 34 G/DL (ref 31–36)
MCV RBC AUTO: 79.6 FL (ref 80–100)
MONOCYTES # BLD: 1.1 K/UL (ref 0–1.3)
MONOCYTES NFR BLD: 12.4 %
NEUTROPHILS # BLD: 6 K/UL (ref 1.7–7.7)
NEUTROPHILS NFR BLD: 67.2 %
PHOSPHATE SERPL-MCNC: 2.5 MG/DL (ref 2.5–4.9)
PLATELET # BLD AUTO: 291 K/UL (ref 135–450)
PMV BLD AUTO: 9.1 FL (ref 5–10.5)
POTASSIUM SERPL-SCNC: 3.3 MMOL/L (ref 3.5–5.1)
RBC # BLD AUTO: 3.46 M/UL (ref 4.2–5.9)
SODIUM SERPL-SCNC: 136 MMOL/L (ref 136–145)
WBC # BLD AUTO: 8.9 K/UL (ref 4–11)

## 2024-03-03 PROCEDURE — C9113 INJ PANTOPRAZOLE SODIUM, VIA: HCPCS | Performed by: STUDENT IN AN ORGANIZED HEALTH CARE EDUCATION/TRAINING PROGRAM

## 2024-03-03 PROCEDURE — 36415 COLL VENOUS BLD VENIPUNCTURE: CPT

## 2024-03-03 PROCEDURE — 6360000002 HC RX W HCPCS: Performed by: STUDENT IN AN ORGANIZED HEALTH CARE EDUCATION/TRAINING PROGRAM

## 2024-03-03 PROCEDURE — 80048 BASIC METABOLIC PNL TOTAL CA: CPT

## 2024-03-03 PROCEDURE — 2500000003 HC RX 250 WO HCPCS: Performed by: STUDENT IN AN ORGANIZED HEALTH CARE EDUCATION/TRAINING PROGRAM

## 2024-03-03 PROCEDURE — 6360000002 HC RX W HCPCS: Performed by: INTERNAL MEDICINE

## 2024-03-03 PROCEDURE — 6370000000 HC RX 637 (ALT 250 FOR IP): Performed by: STUDENT IN AN ORGANIZED HEALTH CARE EDUCATION/TRAINING PROGRAM

## 2024-03-03 PROCEDURE — 94760 N-INVAS EAR/PLS OXIMETRY 1: CPT

## 2024-03-03 PROCEDURE — 2060000000 HC ICU INTERMEDIATE R&B

## 2024-03-03 PROCEDURE — 85025 COMPLETE CBC W/AUTO DIFF WBC: CPT

## 2024-03-03 PROCEDURE — 2580000003 HC RX 258: Performed by: INTERNAL MEDICINE

## 2024-03-03 PROCEDURE — 2580000003 HC RX 258

## 2024-03-03 PROCEDURE — 84100 ASSAY OF PHOSPHORUS: CPT

## 2024-03-03 PROCEDURE — 2580000003 HC RX 258: Performed by: STUDENT IN AN ORGANIZED HEALTH CARE EDUCATION/TRAINING PROGRAM

## 2024-03-03 RX ORDER — WATER 10 ML/10ML
INJECTION INTRAMUSCULAR; INTRAVENOUS; SUBCUTANEOUS
Status: COMPLETED
Start: 2024-03-03 | End: 2024-03-03

## 2024-03-03 RX ORDER — POTASSIUM CHLORIDE 20 MEQ/1
40 TABLET, EXTENDED RELEASE ORAL ONCE
Status: COMPLETED | OUTPATIENT
Start: 2024-03-03 | End: 2024-03-03

## 2024-03-03 RX ORDER — PREDNISONE 20 MG/1
40 TABLET ORAL DAILY
Status: COMPLETED | OUTPATIENT
Start: 2024-03-03 | End: 2024-03-05

## 2024-03-03 RX ADMIN — PANTOPRAZOLE SODIUM 40 MG: 40 INJECTION, POWDER, FOR SOLUTION INTRAVENOUS at 16:13

## 2024-03-03 RX ADMIN — PANTOPRAZOLE SODIUM 40 MG: 40 INJECTION, POWDER, FOR SOLUTION INTRAVENOUS at 05:26

## 2024-03-03 RX ADMIN — METHYLPREDNISOLONE SODIUM SUCCINATE 40 MG: 40 INJECTION INTRAMUSCULAR; INTRAVENOUS at 11:55

## 2024-03-03 RX ADMIN — ENOXAPARIN SODIUM 40 MG: 100 INJECTION SUBCUTANEOUS at 20:16

## 2024-03-03 RX ADMIN — PREDNISONE 40 MG: 20 TABLET ORAL at 18:46

## 2024-03-03 RX ADMIN — POTASSIUM CHLORIDE 40 MEQ: 1500 TABLET, EXTENDED RELEASE ORAL at 10:09

## 2024-03-03 RX ADMIN — DOXYCYCLINE 100 MG: 100 INJECTION, POWDER, LYOPHILIZED, FOR SOLUTION INTRAVENOUS at 13:17

## 2024-03-03 RX ADMIN — AMPICILLIN SODIUM AND SULBACTAM SODIUM 3000 MG: 2; 1 INJECTION, POWDER, FOR SOLUTION INTRAMUSCULAR; INTRAVENOUS at 02:37

## 2024-03-03 RX ADMIN — AMPICILLIN SODIUM AND SULBACTAM SODIUM 3000 MG: 2; 1 INJECTION, POWDER, FOR SOLUTION INTRAMUSCULAR; INTRAVENOUS at 08:05

## 2024-03-03 RX ADMIN — CLOTRIMAZOLE: 10 CREAM TOPICAL at 20:17

## 2024-03-03 RX ADMIN — AMPICILLIN SODIUM AND SULBACTAM SODIUM 3000 MG: 2; 1 INJECTION, POWDER, FOR SOLUTION INTRAMUSCULAR; INTRAVENOUS at 20:15

## 2024-03-03 RX ADMIN — AMPICILLIN SODIUM AND SULBACTAM SODIUM 3000 MG: 2; 1 INJECTION, POWDER, FOR SOLUTION INTRAMUSCULAR; INTRAVENOUS at 16:04

## 2024-03-03 RX ADMIN — CLOTRIMAZOLE: 10 CREAM TOPICAL at 09:41

## 2024-03-03 RX ADMIN — WATER 10 ML: 1 INJECTION INTRAMUSCULAR; INTRAVENOUS; SUBCUTANEOUS at 09:36

## 2024-03-03 ASSESSMENT — PAIN SCALES - GENERAL
PAINLEVEL_OUTOF10: 0

## 2024-03-03 ASSESSMENT — PAIN SCALES - WONG BAKER: WONGBAKER_NUMERICALRESPONSE: 0

## 2024-03-04 LAB
ANION GAP SERPL CALCULATED.3IONS-SCNC: 8 MMOL/L (ref 3–16)
BASOPHILS # BLD: 0 K/UL (ref 0–0.2)
BASOPHILS NFR BLD: 0.1 %
BUN SERPL-MCNC: 17 MG/DL (ref 7–20)
CALCIUM SERPL-MCNC: 8.5 MG/DL (ref 8.3–10.6)
CHLORIDE SERPL-SCNC: 103 MMOL/L (ref 99–110)
CO2 SERPL-SCNC: 25 MMOL/L (ref 21–32)
EOSINOPHIL # BLD: 0 K/UL (ref 0–0.6)
EOSINOPHIL NFR BLD: 0 %
GFR SERPLBLD CREATININE-BSD FMLA CKD-EPI: >60 ML/MIN/{1.73_M2}
GLUCOSE SERPL-MCNC: 113 MG/DL (ref 70–99)
HGB BLD-MCNC: 9.2 G/DL (ref 13.5–17.5)
LYMPHOCYTES # BLD: 1.5 K/UL (ref 1–5.1)
LYMPHOCYTES NFR BLD: 17.9 %
MCH RBC QN AUTO: 27.7 PG (ref 26–34)
MCHC RBC AUTO-ENTMCNC: 34.8 G/DL (ref 31–36)
MCV RBC AUTO: 79.5 FL (ref 80–100)
MONOCYTES # BLD: 0.8 K/UL (ref 0–1.3)
MONOCYTES NFR BLD: 9.8 %
NEUTROPHILS # BLD: 6.2 K/UL (ref 1.7–7.7)
NEUTROPHILS NFR BLD: 72.2 %
PHOSPHATE SERPL-MCNC: 2.7 MG/DL (ref 2.5–4.9)
PLATELET # BLD AUTO: 298 K/UL (ref 135–450)
PMV BLD AUTO: 8.7 FL (ref 5–10.5)
RBC # BLD AUTO: 3.32 M/UL (ref 4.2–5.9)
SODIUM SERPL-SCNC: 136 MMOL/L (ref 136–145)
WBC # BLD AUTO: 8.6 K/UL (ref 4–11)

## 2024-03-04 PROCEDURE — 2060000000 HC ICU INTERMEDIATE R&B

## 2024-03-04 PROCEDURE — 2580000003 HC RX 258: Performed by: STUDENT IN AN ORGANIZED HEALTH CARE EDUCATION/TRAINING PROGRAM

## 2024-03-04 PROCEDURE — 84100 ASSAY OF PHOSPHORUS: CPT

## 2024-03-04 PROCEDURE — 6360000002 HC RX W HCPCS: Performed by: INTERNAL MEDICINE

## 2024-03-04 PROCEDURE — 2580000003 HC RX 258: Performed by: REGISTERED NURSE

## 2024-03-04 PROCEDURE — 36415 COLL VENOUS BLD VENIPUNCTURE: CPT

## 2024-03-04 PROCEDURE — 6360000002 HC RX W HCPCS: Performed by: STUDENT IN AN ORGANIZED HEALTH CARE EDUCATION/TRAINING PROGRAM

## 2024-03-04 PROCEDURE — C9113 INJ PANTOPRAZOLE SODIUM, VIA: HCPCS | Performed by: STUDENT IN AN ORGANIZED HEALTH CARE EDUCATION/TRAINING PROGRAM

## 2024-03-04 PROCEDURE — 2500000003 HC RX 250 WO HCPCS: Performed by: STUDENT IN AN ORGANIZED HEALTH CARE EDUCATION/TRAINING PROGRAM

## 2024-03-04 PROCEDURE — 97116 GAIT TRAINING THERAPY: CPT

## 2024-03-04 PROCEDURE — 97530 THERAPEUTIC ACTIVITIES: CPT

## 2024-03-04 PROCEDURE — 2580000003 HC RX 258: Performed by: INTERNAL MEDICINE

## 2024-03-04 PROCEDURE — 94760 N-INVAS EAR/PLS OXIMETRY 1: CPT

## 2024-03-04 PROCEDURE — 85025 COMPLETE CBC W/AUTO DIFF WBC: CPT

## 2024-03-04 PROCEDURE — 99232 SBSQ HOSP IP/OBS MODERATE 35: CPT | Performed by: INTERNAL MEDICINE

## 2024-03-04 PROCEDURE — 80048 BASIC METABOLIC PNL TOTAL CA: CPT

## 2024-03-04 PROCEDURE — 2580000003 HC RX 258: Performed by: HOSPITALIST

## 2024-03-04 PROCEDURE — 6370000000 HC RX 637 (ALT 250 FOR IP): Performed by: STUDENT IN AN ORGANIZED HEALTH CARE EDUCATION/TRAINING PROGRAM

## 2024-03-04 RX ORDER — PREDNISONE 20 MG/1
40 TABLET ORAL DAILY
Qty: 2 TABLET | Refills: 0 | Status: SHIPPED | OUTPATIENT
Start: 2024-03-05 | End: 2024-03-04

## 2024-03-04 RX ORDER — AMOXICILLIN AND CLAVULANATE POTASSIUM 125; 31.25 MG/5ML; MG/5ML
125 FOR SUSPENSION ORAL 2 TIMES DAILY
Qty: 100 ML | Refills: 0 | Status: SHIPPED | OUTPATIENT
Start: 2024-03-04 | End: 2024-03-04 | Stop reason: HOSPADM

## 2024-03-04 RX ORDER — CLOTRIMAZOLE 1 %
1 CREAM (GRAM) TOPICAL 2 TIMES DAILY
Qty: 28 G | Refills: 0 | Status: SHIPPED | OUTPATIENT
Start: 2024-03-04 | End: 2024-03-18

## 2024-03-04 RX ORDER — CLOTRIMAZOLE 1 %
1 CREAM (GRAM) TOPICAL 2 TIMES DAILY
Qty: 28 G | Refills: 0 | Status: SHIPPED | OUTPATIENT
Start: 2024-03-04 | End: 2024-03-04

## 2024-03-04 RX ORDER — PREDNISONE 20 MG/1
40 TABLET ORAL DAILY
Qty: 2 TABLET | Refills: 0 | Status: SHIPPED | OUTPATIENT
Start: 2024-03-05 | End: 2024-03-06

## 2024-03-04 RX ORDER — AMOXICILLIN AND CLAVULANATE POTASSIUM 500; 125 MG/1; MG/1
1 TABLET, FILM COATED ORAL 3 TIMES DAILY
Qty: 21 TABLET | Refills: 0 | Status: SHIPPED | OUTPATIENT
Start: 2024-03-04 | End: 2024-03-04 | Stop reason: HOSPADM

## 2024-03-04 RX ORDER — AMOXICILLIN AND CLAVULANATE POTASSIUM 600; 42.9 MG/5ML; MG/5ML
600 POWDER, FOR SUSPENSION ORAL 2 TIMES DAILY
Qty: 100 ML | Refills: 0 | Status: SHIPPED | OUTPATIENT
Start: 2024-03-04 | End: 2024-03-14

## 2024-03-04 RX ORDER — AMOXICILLIN AND CLAVULANATE POTASSIUM 125; 31.25 MG/5ML; MG/5ML
125 FOR SUSPENSION ORAL 2 TIMES DAILY
Qty: 100 ML | Refills: 0 | Status: SHIPPED | OUTPATIENT
Start: 2024-03-04 | End: 2024-03-04

## 2024-03-04 RX ADMIN — PANTOPRAZOLE SODIUM 40 MG: 40 INJECTION, POWDER, FOR SOLUTION INTRAVENOUS at 17:33

## 2024-03-04 RX ADMIN — CLOTRIMAZOLE: 10 CREAM TOPICAL at 21:11

## 2024-03-04 RX ADMIN — AMPICILLIN SODIUM AND SULBACTAM SODIUM 3000 MG: 2; 1 INJECTION, POWDER, FOR SOLUTION INTRAMUSCULAR; INTRAVENOUS at 21:09

## 2024-03-04 RX ADMIN — PREDNISONE 40 MG: 20 TABLET ORAL at 09:28

## 2024-03-04 RX ADMIN — DOXYCYCLINE 100 MG: 100 INJECTION, POWDER, LYOPHILIZED, FOR SOLUTION INTRAVENOUS at 22:15

## 2024-03-04 RX ADMIN — SODIUM CHLORIDE: 9 INJECTION, SOLUTION INTRAVENOUS at 14:47

## 2024-03-04 RX ADMIN — AMPICILLIN SODIUM AND SULBACTAM SODIUM 3000 MG: 2; 1 INJECTION, POWDER, FOR SOLUTION INTRAMUSCULAR; INTRAVENOUS at 14:47

## 2024-03-04 RX ADMIN — DOXYCYCLINE 100 MG: 100 INJECTION, POWDER, LYOPHILIZED, FOR SOLUTION INTRAVENOUS at 01:12

## 2024-03-04 RX ADMIN — SODIUM CHLORIDE: 9 INJECTION, SOLUTION INTRAVENOUS at 09:32

## 2024-03-04 RX ADMIN — SODIUM CHLORIDE, PRESERVATIVE FREE 10 ML: 5 INJECTION INTRAVENOUS at 21:09

## 2024-03-04 RX ADMIN — SODIUM CHLORIDE: 9 INJECTION, SOLUTION INTRAVENOUS at 11:27

## 2024-03-04 RX ADMIN — Medication 10 ML: at 09:30

## 2024-03-04 RX ADMIN — PANTOPRAZOLE SODIUM 40 MG: 40 INJECTION, POWDER, FOR SOLUTION INTRAVENOUS at 05:38

## 2024-03-04 RX ADMIN — AMPICILLIN SODIUM AND SULBACTAM SODIUM 3000 MG: 2; 1 INJECTION, POWDER, FOR SOLUTION INTRAMUSCULAR; INTRAVENOUS at 09:33

## 2024-03-04 RX ADMIN — DOXYCYCLINE 100 MG: 100 INJECTION, POWDER, LYOPHILIZED, FOR SOLUTION INTRAVENOUS at 11:28

## 2024-03-04 RX ADMIN — AMPICILLIN SODIUM AND SULBACTAM SODIUM 3000 MG: 2; 1 INJECTION, POWDER, FOR SOLUTION INTRAMUSCULAR; INTRAVENOUS at 02:28

## 2024-03-04 RX ADMIN — CLOTRIMAZOLE: 10 CREAM TOPICAL at 09:33

## 2024-03-04 RX ADMIN — ENOXAPARIN SODIUM 40 MG: 100 INJECTION SUBCUTANEOUS at 21:08

## 2024-03-04 ASSESSMENT — PAIN SCALES - GENERAL
PAINLEVEL_OUTOF10: 0
PAINLEVEL_OUTOF10: 0

## 2024-03-04 NOTE — DISCHARGE INSTR - COC
Continuity of Care Form    Patient Name: Adama Tello   :  1970  MRN:  [id number]    Admit date:  2024  Discharge date:  3/4/2024    Code Status Order: Full Code   Advance Directives:     Admitting Physician:  Cm Veloz MD  PCP: No primary care provider on file.    Discharging Nurse: Hanna Mcintosh RN  Discharging Hospital Unit/Room#: T6W-5523/5280-01  Discharging Unit Phone Number: [phone]    Emergency Contact:   Extended Emergency Contact Information  Primary Emergency Contact: Gaby Tello  Address: [address] [place] [address]  Mobile Phone: [phone]  Relation: Parent  Secondary Emergency Contact: Easton tello  Mobile Phone: [phone]  Relation: Brother/Sister  Preferred language: English    Past Surgical History:  Past Surgical History:   Procedure Laterality Date    AORTIC ROOT REPLACEMENT      CRANIOTOMY      UPPER GASTROINTESTINAL ENDOSCOPY N/A 2023    EGD DILATION BALLOON performed by Ryan Tracy MD at [facility] ASC ENDOSCOPY       Immunization History:     There is no immunization history on file for this patient.    Active Problems:  Patient Active Problem List   Diagnosis Code    Intractable nausea and vomiting R11.2    N&V (nausea and vomiting) R11.2    Intractable hiccups R06.6    HTN (hypertension) I10    Nausea & vomiting R11.2    Hyponatremia E87.1    Bilateral pneumonia J18.9    Sepsis (HCC) A41.9    Altered mental status R41.82    Hypomagnesemia E83.42    Thrombocytopenia (HCC) D69.6    Bacteremia R78.81    Fever and chills R50.9    Hx of repair of aortic root Z98.890    Pneumonia due to infectious organism J18.9    Acute respiratory failure with hypoxia (HCC) J96.01    Abnormal CT of the chest R93.89    Infection due to Acinetobacter species A49.8    Marfan's syndrome Q87.40       Isolation/Infection:   Isolation            No Isolation          Patient Infection Status       None to display                     Nurse Assessment:  Last

## 2024-03-04 NOTE — DISCHARGE SUMMARY
Hospital Medicine Discharge Summary    Patient ID: Adama Tello      Patient's PCP: No primary care provider on file.    Admit Date: 2/22/2024     Discharge Date:   03/04/24      Admitting Provider: Cm Veloz MD     Discharge Provider: Stephen Lira MD     Discharge Diagnoses:       Active Hospital Problems    Diagnosis     Acute respiratory failure with hypoxia (HCC) [J96.01]     Abnormal CT of the chest [R93.89]     Infection due to Acinetobacter species [A49.8]     Marfan's syndrome [Q87.40]     Altered mental status [R41.82]     Hypomagnesemia [E83.42]     Thrombocytopenia (HCC) [D69.6]     Bacteremia [R78.81]     Fever and chills [R50.9]     Hx of repair of aortic root [Z98.890]     Pneumonia due to infectious organism [J18.9]     Hyponatremia [E87.1]     Bilateral pneumonia [J18.9]     Sepsis (HCC) [A41.9]        The patient was seen and examined on day of discharge and this discharge summary is in conjunction with any daily progress note from day of discharge.    Hospital Course:   54 y.o. male who presents with a past medical history of traumatic brain injury, Marfan syndrome, dysphagia .   Patient presented with harsh cough, poor appetite, nausea and vomiting in recent days, had severe hyponatremia with sodium of 105. Chest x-ray was concerning for pneumonia. Patient was given 3% of saline bolus, cefepime and doxycycline at the emergency department.      Admission course :  - Na slowly corrected, required ICU admission.   - blood culture positive for gram variable organism ( ID consulted).  - worsened oxygen requirements needing HFNC 2/29.   - weaned off oxygen 3/2      Acute hypovolemic hyponatremia :  Initially was on hypertonic saline, requiring ICU admission. Was slowly corrected    No symptoms other than myalgias and fatigue.  Palliative on board.  Advance diet as tolerated.       Today na 127--130--> 132--> 130--> 133--> 138--> 136.        Off IV fluids.               Low TSH and T4:

## 2024-03-05 VITALS
RESPIRATION RATE: 22 BRPM | WEIGHT: 198.41 LBS | DIASTOLIC BLOOD PRESSURE: 102 MMHG | TEMPERATURE: 98.2 F | SYSTOLIC BLOOD PRESSURE: 145 MMHG | OXYGEN SATURATION: 95 % | HEIGHT: 74 IN | HEART RATE: 81 BPM | BODY MASS INDEX: 25.46 KG/M2

## 2024-03-05 LAB
ANION GAP SERPL CALCULATED.3IONS-SCNC: 8 MMOL/L (ref 3–16)
BASOPHILS # BLD: 0 K/UL (ref 0–0.2)
BASOPHILS NFR BLD: 0.1 %
BUN SERPL-MCNC: 15 MG/DL (ref 7–20)
CALCIUM SERPL-MCNC: 8.5 MG/DL (ref 8.3–10.6)
CHLORIDE SERPL-SCNC: 103 MMOL/L (ref 99–110)
CO2 SERPL-SCNC: 25 MMOL/L (ref 21–32)
CREAT SERPL-MCNC: 0.7 MG/DL (ref 0.9–1.3)
DEPRECATED RDW RBC AUTO: 17.3 % (ref 12.4–15.4)
EOSINOPHIL # BLD: 0 K/UL (ref 0–0.6)
EOSINOPHIL NFR BLD: 0.1 %
GFR SERPLBLD CREATININE-BSD FMLA CKD-EPI: >60 ML/MIN/{1.73_M2}
GLUCOSE SERPL-MCNC: 94 MG/DL (ref 70–99)
HCT VFR BLD AUTO: 27.1 % (ref 40.5–52.5)
HGB BLD-MCNC: 9.1 G/DL (ref 13.5–17.5)
LYMPHOCYTES # BLD: 2.8 K/UL (ref 1–5.1)
LYMPHOCYTES NFR BLD: 30.2 %
MCH RBC QN AUTO: 27.2 PG (ref 26–34)
MCHC RBC AUTO-ENTMCNC: 33.7 G/DL (ref 31–36)
MCV RBC AUTO: 80.8 FL (ref 80–100)
MONOCYTES # BLD: 1.1 K/UL (ref 0–1.3)
MONOCYTES NFR BLD: 11.6 %
NEUTROPHILS # BLD: 5.4 K/UL (ref 1.7–7.7)
NEUTROPHILS NFR BLD: 58 %
PHOSPHATE SERPL-MCNC: 2.5 MG/DL (ref 2.5–4.9)
PLATELET # BLD AUTO: 296 K/UL (ref 135–450)
PMV BLD AUTO: 8.6 FL (ref 5–10.5)
POTASSIUM SERPL-SCNC: 3.3 MMOL/L (ref 3.5–5.1)
RBC # BLD AUTO: 3.35 M/UL (ref 4.2–5.9)
SODIUM SERPL-SCNC: 136 MMOL/L (ref 136–145)
WBC # BLD AUTO: 9.4 K/UL (ref 4–11)

## 2024-03-05 PROCEDURE — 85025 COMPLETE CBC W/AUTO DIFF WBC: CPT

## 2024-03-05 PROCEDURE — 2580000003 HC RX 258: Performed by: INTERNAL MEDICINE

## 2024-03-05 PROCEDURE — 84100 ASSAY OF PHOSPHORUS: CPT

## 2024-03-05 PROCEDURE — C9113 INJ PANTOPRAZOLE SODIUM, VIA: HCPCS | Performed by: STUDENT IN AN ORGANIZED HEALTH CARE EDUCATION/TRAINING PROGRAM

## 2024-03-05 PROCEDURE — 94760 N-INVAS EAR/PLS OXIMETRY 1: CPT

## 2024-03-05 PROCEDURE — 80048 BASIC METABOLIC PNL TOTAL CA: CPT

## 2024-03-05 PROCEDURE — 6360000002 HC RX W HCPCS: Performed by: INTERNAL MEDICINE

## 2024-03-05 PROCEDURE — 36415 COLL VENOUS BLD VENIPUNCTURE: CPT

## 2024-03-05 PROCEDURE — 2580000003 HC RX 258: Performed by: STUDENT IN AN ORGANIZED HEALTH CARE EDUCATION/TRAINING PROGRAM

## 2024-03-05 PROCEDURE — 6360000002 HC RX W HCPCS: Performed by: STUDENT IN AN ORGANIZED HEALTH CARE EDUCATION/TRAINING PROGRAM

## 2024-03-05 PROCEDURE — 6370000000 HC RX 637 (ALT 250 FOR IP): Performed by: STUDENT IN AN ORGANIZED HEALTH CARE EDUCATION/TRAINING PROGRAM

## 2024-03-05 PROCEDURE — 6360000002 HC RX W HCPCS: Performed by: REGISTERED NURSE

## 2024-03-05 RX ORDER — AMOXICILLIN 250 MG/5ML
500 POWDER, FOR SUSPENSION ORAL EVERY 8 HOURS SCHEDULED
Status: DISCONTINUED | OUTPATIENT
Start: 2024-03-05 | End: 2024-03-05 | Stop reason: HOSPADM

## 2024-03-05 RX ADMIN — CLOTRIMAZOLE: 10 CREAM TOPICAL at 09:43

## 2024-03-05 RX ADMIN — POTASSIUM CHLORIDE 10 MEQ: 7.46 INJECTION, SOLUTION INTRAVENOUS at 06:55

## 2024-03-05 RX ADMIN — PREDNISONE 40 MG: 20 TABLET ORAL at 09:43

## 2024-03-05 RX ADMIN — PANTOPRAZOLE SODIUM 40 MG: 40 INJECTION, POWDER, FOR SOLUTION INTRAVENOUS at 04:28

## 2024-03-05 RX ADMIN — AMPICILLIN SODIUM AND SULBACTAM SODIUM 3000 MG: 2; 1 INJECTION, POWDER, FOR SOLUTION INTRAMUSCULAR; INTRAVENOUS at 03:20

## 2024-03-05 NOTE — PROGRESS NOTES
Infectious Diseases Inpatient  Progress Note        CHIEF COMPLAINT:       Fevers  Change in mentation   Hyponatremia  Bacteremia  Crp ELEVATION  Aortic Root replacement  Marfans syndrome   Acinetobacter bacteremia  Bilateral pneumonia  Acute hypoxic respiratory failure          HISTORY OF PRESENT ILLNESS:  54 y.o. man with a significant history for hypertension, depression, history of Marfan syndrome traumatic brain injury history of aortic root surgery in 2010, valve sparing surgery admitted to hospital secondary to confusion change in mental status family noticed that he was not following commands confused with frothing from the mouth  hence 911 was called EMT found him to be unresponsive he is currently in the ICU secondary to severe hyponatremia labs indicated sodium was 105, this has been corrected with hypertonic saline nephrology is closely watching correcting the electrolytes.  Mental status somewhat improved but patient is unable to provide any history not able to follow commands, labs indicate creatinine 0.8 potassium 3.8 procalcitonin 0.25 LFT normal cortisol normal UDS screen negative blood alcohol negative TSH 0.35 WBC 4.1 hemoglobin 12.1 platelets at 47 rapid flu negative COVID-19 negative, blood culture from 2/22/24 Gram stain aerobic bottle with gram-variable rods organism was not identified by PCR, repeat blood cultures requested, Tmax 101.7MRI brain negative, chest x-ray with airspace opacities in the lung bases concerning for pneumonia CT head negative.  We are consulted for recommendations    Interval History : Not doing well increased respiratory distress is requiring high flow nasal cannula no fevers but using 20 L through high flow nasal cannula family is at bedside discussed with brother on the phone per request.  His mother was with him at bedside she herself has Marfan syndrome she underwent aortic valve replacement per family    Past Medical History:    Past Medical History: 
      Infectious Diseases Inpatient  Progress Note        CHIEF COMPLAINT:       Fevers  Change in mentation   Hyponatremia  Bacteremia  Crp ELEVATION  Aortic Root replacement  Marfans syndrome   Acinetobacter bacteremia  Bilateral pneumonia  Acute hypoxic respiratory failure          HISTORY OF PRESENT ILLNESS:  54 y.o. man with a significant history for hypertension, depression, history of Marfan syndrome traumatic brain injury history of aortic root surgery in 2010, valve sparing surgery admitted to hospital secondary to confusion change in mental status family noticed that he was not following commands confused with frothing from the mouth  hence 911 was called EMT found him to be unresponsive he is currently in the ICU secondary to severe hyponatremia labs indicated sodium was 105, this has been corrected with hypertonic saline nephrology is closely watching correcting the electrolytes.  Mental status somewhat improved but patient is unable to provide any history not able to follow commands, labs indicate creatinine 0.8 potassium 3.8 procalcitonin 0.25 LFT normal cortisol normal UDS screen negative blood alcohol negative TSH 0.35 WBC 4.1 hemoglobin 12.1 platelets at 47 rapid flu negative COVID-19 negative, blood culture from 2/22/24 Gram stain aerobic bottle with gram-variable rods organism was not identified by PCR, repeat blood cultures requested, Tmax 101.7MRI brain negative, chest x-ray with airspace opacities in the lung bases concerning for pneumonia CT head negative.  We are consulted for recommendations    Interval History : Sodium seems to be improved unfortunately respiratory status is worse requiring 12 L nasal cannula ongoing cough not able to bring up much sputum blood culture reported Acinetobacter from [facility] sensitivities pending      Past Medical History:    Past Medical History:   Diagnosis Date    Esophageal achalasia     GERD (gastroesophageal reflux disease)     Hypertension     Hypertension  
    V2.0    [facility] [place] Progress Note      Name:  Adama Tello /Age/Sex: 1970  (54 y.o. male)   MRN & CSN:  [id number] & [id number] Encounter Date/Time: 3/3/2024 8:57 AM EDT   Location:  [id number] PCP: No primary care provider on file.     Attending:Stephen Lira MD       Hospital Day: 11      HPI :   Chief Complaint: lethargy.     Adama Tello is a 54 y.o. male who presents with a past medical history of traumatic brain injury, Marfan syndrome, dysphagia .   Patient presented with harsh cough, poor appetite, nausea and vomiting in recent days, had severe hyponatremia with sodium of 105. Chest x-ray was concerning for pneumonia. Patient was given 3% of saline bolus, cefepime and doxycycline at the emergency department.     Admission course :  - Na slowly corrected, required ICU admission.   - blood culture positive for gram variable organism ( ID consulted).  - worsened oxygen requirements needing HFNC .   - weaned off oxygen 3/2    Subjective:   Off oxygen, denies any complaints this morning.   Anxious about discharge.  Review of Systems:      Pertinent positives and negatives discussed in HPI    Objective:     Intake/Output Summary (Last 24 hours) at 3/3/2024 1424  Last data filed at 3/3/2024 0526  Gross per 24 hour   Intake 610 ml   Output 0 ml   Net 610 ml          Vitals:   Vitals:    24 0339 24 0756 24 0924 24 1257   BP: 109/82 (!) 117/96  136/85   Pulse: 95 95 (!) 104 99   Resp: 20 (!) 32 (!) 34 16   Temp: 97.7 °F (36.5 °C) 97.8 °F (36.6 °C)  97.7 °F (36.5 °C)   TempSrc: Oral Oral  Axillary   SpO2: 92% 95% 97% 98%   Weight: 89.2 kg (196 lb 10.4 oz)      Height:             Physical Exam:      Physical Exam Performed:    /85   Pulse 99   Temp 97.7 °F (36.5 °C) (Axillary)   Resp 16   Ht 1.88 m (6' 2\")   Wt 89.2 kg (196 lb 10.4 oz)   SpO2 98%   BMI 25.25 kg/m²     General appearance: looks more comfortable today.    Off oxygen.   Facial hypopigmented lesions 
    V2.0    [facility] [place] Progress Note      Name:  Adama Tello /Age/Sex: 1970  (54 y.o. male)   MRN & CSN:  [id number] & [id number] Encounter Date/Time: 2024 8:57 AM EDT   Location:  [id number] PCP: No primary care provider on file.     Attending:Stephen Lira MD       Hospital Day: 6      HPI :   Chief Complaint: lethargy.     Adama Tello is a 54 y.o. male who presents with a past medical history of traumatic brain injury.   Patient presented with harsh cough, poor appetite, nausea and vomiting in recent days, had severe hyponatremia with sodium of 105. Chest x-ray was concerning for pneumonia. Patient was given 3% of saline bolus, cefepime and doxycycline at the emergency department.     Admission course :  - Na slowly corrected, required ICU admission.   - blood culture positive for gram variable organism ( ID consulted).     Subjective:   Continues to report feeling fatigue, reports feeling ( sore everywhere ).   Continues to be on 3 liters of oxygen via nasal canula.   Review of Systems:      Pertinent positives and negatives discussed in HPI    Objective:     Intake/Output Summary (Last 24 hours) at 2024 1139  Last data filed at 2024 1011  Gross per 24 hour   Intake 3380.65 ml   Output 4310 ml   Net -929.35 ml        Vitals:   Vitals:    24 0733 24 0800 24 0900 24 1112   BP:  (!) 136/93 (!) 150/104 124/73   Pulse: 94 92 (!) 102 100   Resp:    Temp:  97.8 °F (36.6 °C)  98.8 °F (37.1 °C)   TempSrc:  Oral  Axillary   SpO2: 91% 94% 94% 98%   Weight:       Height:             Physical Exam:      Physical Exam Performed:    /73   Pulse 100   Temp 98.8 °F (37.1 °C) (Axillary)   Resp 24   Ht 1.88 m (6' 2\")   Wt 83.2 kg (183 lb 6.8 oz)   SpO2 98%   BMI 23.55 kg/m²     General appearance: ill appearing male patient.   On oxygen via nasal canula.   HEENT: Pupils equal, round, and reactive to light. Conjunctivae/corneas clear.  Neck: Supple, with full 
    V2.0    [facility] [place] Progress Note      Name:  Adama Tello /Age/Sex: 1970  (54 y.o. male)   MRN & CSN:  [id number] & [id number] Encounter Date/Time: 3/1/2024 8:57 AM EDT   Location:  [id number] PCP: No primary care provider on file.     Attending:Stephen Lira MD       Hospital Day: 9      HPI :   Chief Complaint: lethargy.     Adama Tello is a 54 y.o. male who presents with a past medical history of traumatic brain injury, Marfan syndrome, dysphagia .   Patient presented with harsh cough, poor appetite, nausea and vomiting in recent days, had severe hyponatremia with sodium of 105. Chest x-ray was concerning for pneumonia. Patient was given 3% of saline bolus, cefepime and doxycycline at the emergency department.     Admission course :  - Na slowly corrected, required ICU admission.   - blood culture positive for gram variable organism ( ID consulted).  - worsened oxygen requirements needing HFNC      Subjective:   Oxygen requirements starting to improve, now on 6 liters NC ( from HFNC 15 liters this morning ).   Seems more alert, denies any complaints, eager for DC and diet.   Urine output 1.3 liters in the last 24 hours.   Review of Systems:      Pertinent positives and negatives discussed in HPI    Objective:   No intake or output data in the 24 hours ending 24 1553     Vitals:   Vitals:    24 0824 24 1145 24 1200 24 1215   BP:       Pulse: 96 92 92 91   Resp: 23 17 21 22   Temp:       TempSrc:       SpO2: 100% 100% 97% 98%   Weight:       Height:             Physical Exam:      Physical Exam Performed:    BP (!) 124/98   Pulse 91   Temp 97.7 °F (36.5 °C) (Oral)   Resp 22   Ht 1.88 m (6' 2\")   Wt 81.6 kg (179 lb 14.3 oz)   SpO2 98%   BMI 23.10 kg/m²     General appearance: looks more comfortable today.    On HFNC at the time of my exam.    Facial hypopigmented lesions noted.   HEENT: Pupils equal, round, and reactive to light. Conjunctivae/corneas 
    V2.0    [facility] [place] Progress Note      Name:  Adama Tello /Age/Sex: 1970  (54 y.o. male)   MRN & CSN:  [id number] & [id number] Encounter Date/Time: 3/5/2024 8:57 AM EDT   Location:  [id number]/5280-01 PCP: No primary care provider on file.     Attending:No att. providers found       Hospital Day: 13      HPI :   Chief Complaint: lethargy.     Adama Tello is a 54 y.o. male who presents with a past medical history of traumatic brain injury, Marfan syndrome, dysphagia .   Patient presented with harsh cough, poor appetite, nausea and vomiting in recent days, had severe hyponatremia with sodium of 105. Chest x-ray was concerning for pneumonia. Patient was given 3% of saline bolus, cefepime and doxycycline at the emergency department.     Admission course :  - Na slowly corrected, required ICU admission.   - blood culture positive for gram variable organism ( ID consulted).  - worsened oxygen requirements needing HFNC .   - weaned off oxygen 3/2    Subjective:     Patient feels well today with no complaints.  No pain, no nausea or vomiting.    Review of Systems:      Pertinent positives and negatives discussed in HPI    Objective:     Intake/Output Summary (Last 24 hours) at 3/5/2024 1424  Last data filed at 3/4/2024 2215  Gross per 24 hour   Intake 644.62 ml   Output 375 ml   Net 269.62 ml          Vitals:   Vitals:    24 0425 24 0723 24 0815 24 0923   BP: (!) 143/103 (!) 150/100 (!) 145/102    Pulse: 81 79  81   Resp: 18 19  22   Temp: 98.5 °F (36.9 °C) 98.2 °F (36.8 °C)     TempSrc: Axillary Oral     SpO2: 100% 100%  95%   Weight: 90 kg (198 lb 6.6 oz)      Height: 1.88 m (6' 2\")            Physical Exam:      Physical Exam Performed:    BP (!) 145/102   Pulse 81   Temp 98.2 °F (36.8 °C) (Oral)   Resp 22   Ht 1.88 m (6' 2\")   Wt 90 kg (198 lb 6.6 oz)   SpO2 95%   BMI 25.47 kg/m²     General appearance: looks more comfortable today.    Off oxygen.   Facial hypopigmented lesions noted. 
    V2.0    [facility] [place] Progress Note      Name:  Adama Tello /Age/Sex: 1970  (54 y.o. male)   MRN & CSN:  [id number] & [id number] Encounter Date/Time: 2024 8:57 AM EDT   Location:  [id number]/5280-01 PCP: No primary care provider on file.     Attending:Stephen Lira MD       Hospital Day: 7      HPI :   Chief Complaint: lethargy.     Adama Tello is a 54 y.o. male who presents with a past medical history of traumatic brain injury.   Patient presented with harsh cough, poor appetite, nausea and vomiting in recent days, had severe hyponatremia with sodium of 105. Chest x-ray was concerning for pneumonia. Patient was given 3% of saline bolus, cefepime and doxycycline at the emergency department.     Admission course :  - Na slowly corrected, required ICU admission.   - blood culture positive for gram variable organism ( ID consulted).     Subjective:   Oxygen requirements worsening 2--> 10 liters.   Reported feeling reflux symptoms. ( PPI increased to BID .   Patient brother was later met at bedside and updated at length.   Review of Systems:      Pertinent positives and negatives discussed in HPI    Objective:     Intake/Output Summary (Last 24 hours) at 2024 1726  Last data filed at 2024 1235  Gross per 24 hour   Intake 600 ml   Output 1600 ml   Net -1000 ml        Vitals:   Vitals:    24 0259 24 0346 24 0739 24 1528   BP: (!) 143/77  124/73 122/80   Pulse: (!) 108  (!) 104 (!) 109   Resp: 22     Temp: 97.9 °F (36.6 °C)  97.8 °F (36.6 °C) 97.9 °F (36.6 °C)   TempSrc: Oral  Oral    SpO2: 90%  90% 97%   Weight:  81.1 kg (178 lb 12.7 oz)     Height:             Physical Exam:      Physical Exam Performed:    /80   Pulse (!) 109   Temp 97.9 °F (36.6 °C)   Resp 24   Ht 1.88 m (6' 2\")   Wt 81.1 kg (178 lb 12.7 oz)   SpO2 97%   BMI 22.96 kg/m²     General appearance: ill appearing male patient.   On oxygen via nasal canula.   HEENT: Pupils equal, round, and reactive 
    V2.0    [facility] [place] Progress Note      Name:  Adama Tello /Age/Sex: 1970  (54 y.o. male)   MRN & CSN:  [id number] & [id number] Encounter Date/Time: 2024 8:57 AM EDT   Location:  [id number] PCP: No primary care provider on file.     Attending:Stephen Lira MD       Hospital Day: 5      HPI :   Chief Complaint: lethargy.     Adama Tello is a 54 y.o. male who presents with a past medical history of traumatic brain injury.   Patient presented with harsh cough, poor appetite, nausea and vomiting in recent days, had severe hyponatremia with sodium of 105. Chest x-ray was concerning for pneumonia. Patient was given 3% of saline bolus, cefepime and doxycycline at the emergency department     Subjective:   Reports feeling fatigued and weak.   Na improving.   On 3 liters of oxygen via nasal canula.   Review of Systems:      Pertinent positives and negatives discussed in HPI    Objective:     Intake/Output Summary (Last 24 hours) at 2024 0825  Last data filed at 2024 0751  Gross per 24 hour   Intake 3228.7 ml   Output 3600 ml   Net -371.3 ml      Vitals:   Vitals:    24 0500 24 0600 24 0700 24 0726   BP: 103/63 112/69 (!) 85/49 100/74   Pulse: 95 90 100 91   Resp: 24 27 24 25   Temp:       TempSrc:       SpO2: 90% 90% 95%    Weight:  83 kg (182 lb 15.7 oz)     Height:             Physical Exam:      Physical Exam Performed:    /74   Pulse 91   Temp 98.2 °F (36.8 °C) (Axillary)   Resp 25   Ht 1.88 m (6' 2\")   Wt 83 kg (182 lb 15.7 oz)   SpO2 95%   BMI 23.49 kg/m²     General appearance: ill appearing male patient.   On oxygen via nasal canula.   HEENT: Pupils equal, round, and reactive to light. Conjunctivae/corneas clear.  Neck: Supple, with full range of motion. No jugular venous distention. Trachea midline.  Respiratory:  Normal respiratory effort. Clear to auscultation, bilaterally without Rales/Wheezes/Rhonchi.  Cardiovascular: Regular rate and rhythm with 
  Physician Progress Note      PATIENT:               ERIC BECKER  CSN #:                  [id number]  :                       1970  ADMIT DATE:       2024 7:05 PM  DISCH DATE:  RESPONDING  PROVIDER #:        Stephen Lira MD          QUERY TEXT:    Pt admitted with Sepsis and has encephalopathy documented. If possible, please   document in progress notes and discharge summary further specificity   regarding the type of encephalopathy:    The medical record reflects the following:  Risk Factors: Sepsis PNA hyponatremia traumatic brain injury.  Clinical Indicators: per ED Provider \" On arrival here Mr. Becker remains quite   lethargic \" H&P  pmh of traumatic brain injury who presents with Hyponatremia;   and sepsis secondary to pneumonia. \"No following commands. Neuro: Stupurous   Psych: Staring, when eyes are opened\"  24 per Nephrology \"Encephalopathy   resolving\"  Treatment: CT, MRI head and lab monitoring.  Options provided:  -- Metabolic encephalopathy  -- Septic encephalopathy  -- Other - I will add my own diagnosis  -- Disagree - Not applicable / Not valid  -- Disagree - Clinically unable to determine / Unknown  -- Refer to Clinical Documentation Reviewer    PROVIDER RESPONSE TEXT:    This patient has metabolic encephalopathy.    Query created by: Candy Quiroz on 3/3/2024 7:06 PM      Electronically signed by:  Stephen Lira MD 3/4/2024 1:20 PM          
  Physician Progress Note      PATIENT:               ERIC BECKER  CSN #:                  [id number]  :                       1970  ADMIT DATE:       2024 7:05 PM  DISCH DATE:  RESPONDING  PROVIDER #:        Stephen Lira MD          QUERY TEXT:    Pt admitted with sepsis and PNA.  Pt noted to have elevated RR, HR and low   SPo2. If possible, please document in the progress notes and discharge summary   if you are evaluating and/or treating any of the following:    The medical record reflects the following:  Risk Factors: Sepsis PNA Traumatic brain injury  Clinical Indicators: RR > 22, HR > 90, Spo2 RA 88% ,  Spo2 90-92% on 8-10L Hi   emily O2, VBG Ph 7.329, HCO3 22  Treatment: Oxygen therapy, IV Antibiotics  Options provided:  -- Acute respiratory failure with hypoxia  -- Acute respiratory failure with hypercapnia  -- Other - I will add my own diagnosis  -- Disagree - Not applicable / Not valid  -- Disagree - Clinically unable to determine / Unknown  -- Refer to Clinical Documentation Reviewer    PROVIDER RESPONSE TEXT:    This patient is in acute respiratory failure with hypoxia.    Query created by: Candy Quiroz on 2024 3:05 PM      QUERY TEXT:    Pt admitted with Sepsis and PNA.  Pt noted to have N/V. If possible, please   document in the progress notes and discharge summary if you are evaluating   and/or treating any of the following:    Note: CAP and HCAP indicate where the pneumonia was acquired, not a specific   type.    The medical record reflects the following:  Risk Factors: Traumatic brain injury PNA N/V  Clinical Indicators: CT 24 \"Extensive ground-glass opacities bilaterally   which are most likely pneumonia.\" CXR 24 \"Airspace opacities in the lung   bases, with streaky right perihilar opacities, which could represent   atelectasis and/or infiltrate\" ID \"Chest x-ray bibasilar infiltrates  high   grade bacteremia this a true infection Aerobic GPR variable not detected on 
4 Eyes Skin Assessment     NAME:  Adama Tello  YOB: 1970  MEDICAL RECORD NUMBER:  [id number]    The patient is being assessed for  Shift Handoff    I agree that at least one RN has performed a thorough Head to Toe Skin Assessment on the patient. ALL assessment sites listed below have been assessed.      Areas assessed by both nurses:    Head, Face, Ears, Shoulders, Back, Chest, Arms, Elbows, Hands, Sacrum. Buttock, Coccyx, Ischium, Legs. Feet and Heels, and Under Medical Devices         Does the Patient have a Wound? No noted wound(s)       Socrates Prevention initiated by RN: Yes  Wound Care Orders initiated by RN: No    Pressure Injury (Stage 3,4, Unstageable, DTI, NWPT, and Complex wounds) if present, place Wound referral order by RN under : No    New Ostomies, if present place, Ostomy referral order under : No     Nurse 1 eSignature: Electronically signed by Malini Valle RN on 2/27/24 at 6:57 AM EST    **SHARE this note so that the co-signing nurse can place an eSignature**    Nurse 2 eSignature: {Esignature:[id number]}   
4 Eyes Skin Assessment     NAME:  Adama Tello  YOB: 1970  MEDICAL RECORD NUMBER:  [id number]    The patient is being assessed for  Shift Handoff    I agree that at least one RN has performed a thorough Head to Toe Skin Assessment on the patient. ALL assessment sites listed below have been assessed.      Areas assessed by both nurses:    Head, Face, Ears, Shoulders, Back, Chest, Arms, Elbows, Hands, Sacrum. Buttock, Coccyx, Ischium, Legs. Feet and Heels, and Under Medical Devices         Does the Patient have a Wound? No noted wound(s)       Socrates Prevention initiated by RN: Yes  Wound Care Orders initiated by RN: No    Pressure Injury (Stage 3,4, Unstageable, DTI, NWPT, and Complex wounds) if present, place Wound referral order by RN under : No    New Ostomies, if present place, Ostomy referral order under : No     Nurse 1 eSignature: Electronically signed by Shae Guerrero RN on 2/23/24 at 7:26 AM EST    **SHARE this note so that the co-signing nurse can place an eSignature**    Nurse 2 eSignature: Electronically signed by Moshe Croft RN on 2/23/24 at 7:49 AM EST    
4 Eyes Skin Assessment     NAME:  Adama Tello  YOB: 1970  MEDICAL RECORD NUMBER:  [id number]    The patient is being assessed for  Shift Handoff    I agree that at least one RN has performed a thorough Head to Toe Skin Assessment on the patient. ALL assessment sites listed below have been assessed.      Areas assessed by both nurses:    Head, Face, Ears, Shoulders, Back, Chest, Arms, Elbows, Hands, Sacrum. Buttock, Coccyx, Ischium, Legs. Feet and Heels, and Under Medical Devices         Does the Patient have a Wound? No noted wound(s)       Socrates Prevention initiated by RN: Yes  Wound Care Orders initiated by RN: No    Pressure Injury (Stage 3,4, Unstageable, DTI, NWPT, and Complex wounds) if present, place Wound referral order by RN under : No    New Ostomies, if present place, Ostomy referral order under : No     Nurse 1 eSignature: Electronically signed by Moshe Croft RN on 2/23/24 at 6:59 PM EST    **SHARE this note so that the co-signing nurse can place an eSignature**    Nurse 2 eSignature: Electronically signed by Dulce Monreal RN on 2/23/24 at 9:40 PM EST    
4 Eyes Skin Assessment     NAME:  Adama Tello  YOB: 1970  MEDICAL RECORD NUMBER:  [id number]    The patient is being assessed for  Shift Handoff    I agree that at least one RN has performed a thorough Head to Toe Skin Assessment on the patient. ALL assessment sites listed below have been assessed.      Areas assessed by both nurses:    Head, Face, Ears, Shoulders, Back, Chest, Arms, Elbows, Hands, Sacrum. Buttock, Coccyx, Ischium, and Legs. Feet and Heels        Does the Patient have a Wound? No noted wound(s)       Socrates Prevention initiated by RN: Yes  Wound Care Orders initiated by RN: No    Pressure Injury (Stage 3,4, Unstageable, DTI, NWPT, and Complex wounds) if present, place Wound referral order by RN under : No    New Ostomies, if present place, Ostomy referral order under : No     Nurse 1 eSignature: Electronically signed by Dory Smyth RN on 2/24/24 at 6:05 PM EST    **SHARE this note so that the co-signing nurse can place an eSignature**    Nurse 2 eSignature: Electronically signed by Phuong Villa RN on 2/25/24 at 5:14 AM EST    
4 Eyes Skin Assessment     NAME:  Adama Tello  YOB: 1970  MEDICAL RECORD NUMBER:  [id number]    The patient is being assessed for  Shift Handoff    I agree that at least one RN has performed a thorough Head to Toe Skin Assessment on the patient. ALL assessment sites listed below have been assessed.      Areas assessed by both nurses:    Head, Face, Ears, Shoulders, Back, Chest, Arms, Elbows, Hands, Sacrum. Buttock, Coccyx, Ischium, and Legs. Feet and Heels        Does the Patient have a Wound? No noted wound(s)       Socrates Prevention initiated by RN: Yes  Wound Care Orders initiated by RN: No    Pressure Injury (Stage 3,4, Unstageable, DTI, NWPT, and Complex wounds) if present, place Wound referral order by RN under : No    New Ostomies, if present place, Ostomy referral order under : No     Nurse 1 eSignature: Electronically signed by Phuong Villa RN on 2/26/24 at 2:03 AM EST    **SHARE this note so that the co-signing nurse can place an eSignature**    Nurse 2 eSignature: Electronically signed by Frances Mcmillan RN on 2/26/24 at 8:05 AM EST    
4 Eyes Skin Assessment     NAME:  Adama Tello  YOB: 1970  MEDICAL RECORD NUMBER:  [id number]    The patient is being assessed for  Shift Handoff    I agree that at least one RN has performed a thorough Head to Toe Skin Assessment on the patient. ALL assessment sites listed below have been assessed.      Areas assessed by both nurses:    Head, Face, Ears, Shoulders, Back, Chest, Arms, Elbows, Hands, Sacrum. Buttock, Coccyx, Ischium, Legs. Feet and Heels, and Under Medical Devices         Does the Patient have a Wound? No noted wound(s)       Socrates Prevention initiated by RN: Yes  Wound Care Orders initiated by RN: No    Pressure Injury (Stage 3,4, Unstageable, DTI, NWPT, and Complex wounds) if present, place Wound referral order by RN under : No    New Ostomies, if present place, Ostomy referral order under : No     Nurse 1 eSignature: Electronically signed by Frances Mcmillan RN on 2/26/24 at 6:26 PM EST    **SHARE this note so that the co-signing nurse can place an eSignature**    Nurse 2 eSignature: Electronically signed by Malini Valle RN on 2/2/24 at 7:45 PM EST    
4 Eyes Skin Assessment     NAME:  Adama Tello  YOB: 1970  MEDICAL RECORD NUMBER:  [id number]    The patient is being assessed for  Shift Handoff    I agree that at least one RN has performed a thorough Head to Toe Skin Assessment on the patient. ALL assessment sites listed below have been assessed.      Areas assessed by both nurses:    Head, Face, Ears, Shoulders, Back, Chest, Arms, Elbows, Hands, Sacrum. Buttock, Coccyx, Ischium, and Legs. Feet and Heels        Does the Patient have a Wound? No noted wound(s)       Socrates Prevention initiated by RN: Yes  Wound Care Orders initiated by RN: No    Pressure Injury (Stage 3,4, Unstageable, DTI, NWPT, and Complex wounds) if present, place Wound referral order by RN under : No    New Ostomies, if present place, Ostomy referral order under : No     Nurse 1 eSignature: Electronically signed by Dory Smyth RN on 2/25/24 at 6:34 PM EST    **SHARE this note so that the co-signing nurse can place an eSignature**    Nurse 2 eSignature: Electronically signed by Phuong Villa RN on 2/26/24 at 2:03 AM EST    
4 Eyes Skin Assessment     NAME:  Adama Tello  YOB: 1970  MEDICAL RECORD NUMBER:  [id number]    The patient is being assessed for  Shift Handoff    I agree that at least one RN has performed a thorough Head to Toe Skin Assessment on the patient. ALL assessment sites listed below have been assessed.      Areas assessed by both nurses:    Head, Face, Ears, Shoulders, Back, Chest, Arms, Elbows, Hands, Sacrum. Buttock, Coccyx, Ischium, and Legs. Feet and Heels        Does the Patient have a Wound? No noted wound(s)       Socrates Prevention initiated by RN: Yes  Wound Care Orders initiated by RN: No    Pressure Injury (Stage 3,4, Unstageable, DTI, NWPT, and Complex wounds) if present, place Wound referral order by RN under : No    New Ostomies, if present place, Ostomy referral order under : No     Nurse 1 eSignature: Electronically signed by Phuong Villa RN on 2/25/24 at 5:15 AM EST    **SHARE this note so that the co-signing nurse can place an eSignature**    Nurse 2 eSignature: Electronically signed by Dory Smyth RN on 2/25/24 at 12:55 PM EST    
4 Eyes Skin Assessment     NAME:  Adama Tello  YOB: 1970  MEDICAL RECORD NUMBER:  [id number]    The patient is being assessed for  Shift Handoff    I agree that at least one RN has performed a thorough Head to Toe Skin Assessment on the patient. ALL assessment sites listed below have been assessed.      Areas assessed by both nurses:    Head, Face, Ears, Shoulders, Back, Chest, Arms, Elbows, Hands, Sacrum. Buttock, Coccyx, Ischium, Legs. Feet and Heels, and Under Medical Devices         Does the Patient have a Wound? No noted wound(s)       Socrates Prevention initiated by RN: No  Wound Care Orders initiated by RN: No    Pressure Injury (Stage 3,4, Unstageable, DTI, NWPT, and Complex wounds) if present, place Wound referral order by RN under : No    New Ostomies, if present place, Ostomy referral order under : No     Nurse 1 eSignature: Electronically signed by Dulce Monreal RN on 2/23/24 at 9:40 PM EST    **SHARE this note so that the co-signing nurse can place an eSignature**    Nurse 2 eSignature: Electronically signed by Dory Smyth RN on 2/24/24 at 9:55 AM EST    
4 Eyes Skin Assessment     NAME:  Adama Tello  YOB: 1970  MEDICAL RECORD NUMBER:  [id number]    The patient is being assessed for  Admission    I agree that at least one RN has performed a thorough Head to Toe Skin Assessment on the patient. ALL assessment sites listed below have been assessed.      Areas assessed by both nurses:    Head, Face, Ears, Shoulders, Back, Chest, Arms, Elbows, Hands, Sacrum. Buttock, Coccyx, Ischium, Legs. Feet and Heels, and Under Medical Devices         Does the Patient have a Wound? No noted wound(s)       Socrates Prevention initiated by RN: Yes  Wound Care Orders initiated by RN: No    Pressure Injury (Stage 3,4, Unstageable, DTI, NWPT, and Complex wounds) if present, place Wound referral order by RN under : No    New Ostomies, if present place, Ostomy referral order under : No     Nurse 1 eSignature: Electronically signed by Shae Guerrero RN on 2/23/24 at 12:28 AM EST    **SHARE this note so that the co-signing nurse can place an eSignature**    Nurse 2 eSignature: Electronically signed by Dulce Monreal RN on 2/23/24 at 1:17 AM EST    
Admission:    Pt admitted to ICU room 2104 from ED via stretcher. Pt hooked up to monitor, VSS. Bedside handoff with ED RN Roxane. Pt jim lethargic, unable to answer any questions or follow commands. BMP pending.    Shae Guerrero, BSN, CCRN  
Arrived to place PICC line with bedside RN Shae. Pre-procedure and timeout done with MADELINE Kaufman, discussed limitations of placement and allergies. Consent confirmed. Vital signs stable. Labs, allergies, medications, and code status reviewed. No contraindications noted.     Procedure explained to pt, including the risk and benefits of the procedure. All questions answered. Pt verbalizes understanding of the procedure and states no more questions.       Pt's right basilic  are all easily collapsible with no indication for a clot. Vein selected is large enough for catheter. Pt tolerated sterile procedure well, with no difficulty accessing basilic vein, when accessed - blood was free flowing and non-pulsatile. Guidewire, introducer, and catheter went in smoothly. PICC line verified with 3CG technology with peaked P-waves (please see image below).      Nurses:  OK to use PICC.    Please replace all existing IV tubing with new IV tubing prior to using the PICC for current IV infusions.  Please remove any PIVs from PICC arm.  All of the above may be sources of infection or an increase chance of a clot.      Post procedure - reorganized pt table, placed pt in lowest position, with call light and educated on line care. Instructed pt/RN not to use arm for at least 30min to avoid bleeding. Reported off to bedside RN.      If you have any questions please call number below and dispatch will direct you to the PICC RN that is on call.    [phone]      
BEDSIDE EVAL: called by RT for increased oxygen demand, BS: crackles throughout.    Hospital Day: 6,admitted with sepsis, respiratory failure and PNA  Had been on 2 L nc up until today and  throughout the day O2 was increased up to 10 L nc    When RT rounded  to assess pt->she found that he was requiring 15 L nc    On my exam: pt is awake,alert, tachypneic, was coarse rhonci throughout. Skin w/d. B/p and pulse very stable per telemetry.     Plan: VBG now and application of  Vapotherm and/or NIPPV-> plan discussed with RT. RT will update me with any changes warranting additional measures.   
Called regarding pt desaturating and needing breathing treatment. Found pt on 15lpm HFNC, crackles auscultated throughout all lung feilds. Attending provider notified regarding O2 demand increase of 2 to 15lpm over 24hrs and bilateral crackles.   
Called to pt room to do a PRN neb tx because the pt was requesting it. Pt states they were not requesting tx and they do not want it. Pt placed on 15L High Flow Nasal Cannula. Will continue to monitor.  Leonel Ware RCP  
Clinical Pharmacy Note  Vancomycin Consult    Adama Tello is a 54 y.o. male ordered vancomycin for bloodstream infection; consult received from Dr. Freed to manage therapy. Also receiving cefepime and doxycycline.    Allergies:  Patient has no known allergies.     Temp max:  Temp (24hrs), Av.1 °F (36.7 °C), Min:97.8 °F (36.6 °C), Max:98.8 °F (37.1 °C)      Recent Labs     24  0555 24  0623   WBC 5.1 7.7         Recent Labs     24  0555 24  0400 24  0624   BUN 4* 4* 3*   CREATININE 0.7* 0.7* 0.6*           Intake/Output Summary (Last 24 hours) at 2024 0719  Last data filed at 2024 0126  Gross per 24 hour   Intake 1954.94 ml   Output 2335 ml   Net -380.06 ml         Culture Results:  24 Blood: Gram variable rods  24 Blood: pending    Ht Readings from Last 1 Encounters:   24 1.88 m (6' 2\")        Wt Readings from Last 1 Encounters:   24 81.1 kg (178 lb 12.7 oz)         Estimated Creatinine Clearance: 165 mL/min (A) (based on SCr of 0.6 mg/dL (L)).    Assessment:  Day # 3 of vancomycin.  Current regimen: 1 gram IV every 8 hours    Plan:  Continue  1 gram IV every 8 hours.  Dosed more conservatively due to concurrent ACE-Inhibitor and contrast administration on 24    Thank you for the consult.    ,Electronically signed by Anton Sutton RPH on 2024 at 7:21 AM    
Clinical Pharmacy Note  Vancomycin Consult    Adama Tello is a 54 y.o. male ordered vancomycin for bloodstream infection; consult received from Dr. Freed to manage therapy. Also receiving cefepime and doxycycline.    Allergies:  Patient has no known allergies.     Temp max:  Temp (24hrs), Av.2 °F (36.8 °C), Min:97.5 °F (36.4 °C), Max:98.9 °F (37.2 °C)      Recent Labs     24  0555   WBC 5.1       Recent Labs     24  0621 24  0555 24  0400   BUN 4* 4* 4*   CREATININE 0.8* 0.7* 0.7*         Intake/Output Summary (Last 24 hours) at 2024 0930  Last data filed at 2024 0912  Gross per 24 hour   Intake 3774.3 ml   Output 4300 ml   Net -525.7 ml       Culture Results:  24 Blood: Gram variable rods  24 Blood: pending    Ht Readings from Last 1 Encounters:   24 1.88 m (6' 2\")        Wt Readings from Last 1 Encounters:   24 83.2 kg (183 lb 6.8 oz)         Estimated Creatinine Clearance: 144 mL/min (A) (based on SCr of 0.7 mg/dL (L)).    Assessment:  Day # 2 of vancomycin.  Current regimen: 1 gram IV every 12 hours    Plan:  Dose increased to 1 gram IV every 8 hours.  Projected AUC: 443  Projected Trough: 12 mg/L  Random vancomycin level tomorrow with AM labs    Dosed more conservatively due to concurrent ACE-Inhibitor and contrast administration on 24    Thank you for the consult.      Summer Schwab, RPH, PharmD, BCCCP   
Comprehensive Nutrition Assessment    Type and Reason for Visit:  Initial, RD Nutrition Re-Screen/LOS    Nutrition Recommendations/Plan:   If tube feeding needed recommend Nepro (renal) start at 15 ml/hr.  Increase by 10 ml every 4 hours as tolerated to goal rate of 55 ml/hr (based on 20 hours to account for medical interruptions) 89 grams of protein, 1980 calories, 799 ml free water.    If TF ordered recommend Water flush 30-60 ml every 4 hours  Oral diet as tolerated, if re-ordered consider high protein supplements bid  Will monitor nutritional adequacy, nutrition-related labs, weights, BMs, and clinical progress      Malnutrition Assessment:  Malnutrition Status:  At risk for malnutrition (Comment) (03/01/24 1511)    Context:  Acute Illness     Findings of the 6 clinical characteristics of malnutrition:  Energy Intake:  75% or less of estimated energy requirements for 7 or more days    Nutrition Assessment:    Patient admitted with hyponatremia.  Nephrology following, Na trends improving.  History of traumatic brain injury.  SLP following.  NPO at this time.  Will monitor nutrition progress.  Prior to today patient was on a regular diet with FR 1500 ml/day.  Po intake less than 50% meals most of admission.  At risk for malnutrition.  Will provide TF recommendations in case initiated over the weekend.    Nutrition Related Findings:    Na up to 127 Wound Type: None       Current Nutrition Intake & Therapies:    Average Meal Intake: 26-50%, 1-25%  Average Supplements Intake: Unable to assess  Diet NPO    Anthropometric Measures:  Height: 188 cm (6' 2\")  Ideal Body Weight (IBW): 190 lbs (86 kg)       Current Body Weight: 816 kg (1798 lb 15.3 oz),   IBW. Weight Source: Bed Scale  Current BMI (kg/m2): 230.9                          BMI Categories: Normal Weight (BMI 18.5-24.9)    Estimated Daily Nutrient Needs:  Energy Requirements Based On: Kcal/kg  Weight Used for Energy Requirements: Current  Energy (kcal/day): 
Department of Internal Medicine  Nephrology Progress Note          REASON FOR CONSULTATION:  Hyponatremia.     HISTORY OF PRESENTING ILLNESS:  He is a 53-year-old Afro-American male with past medical history significant for hypertension, depression, traumatic brain injury, brought to ER after family found him confused, disoriented.  Apparently, the patient fell and family noticed foaming from his mouth.  911 was called and EMT found the patient unresponsive.  At the time of presentation to the emergency room, the patient was confused, disoriented.  Routine labs showed sodium of 105, and the patient was admitted for further workup and management.  A Renal consultation was called with above-mentioned reason.  The patient received 100 cc of 3% saline and admitted in ICU for continuous infusion.  At the time of consultation, the patient is alert, awake, and oriented x2, which is much better mental status from last night.  Early morning, sodium was 115 and the patient was taken off the 3% hypertonic saline.      Events noted . Off D5w    S/p DDAVP yesterday .     More awake and oriented .  S/p PT.   Does not feel well but alert awake and better orientation .     REVIEW OF SYSTEMS:  No CP/SOB, feels weak /tired  .     No family present .     Physical Exam:    VITALS:  BP 96/68   Pulse 85   Temp 99 °F (37.2 °C) (Oral)   Resp 20   Ht 1.88 m (6' 2\")   Wt 91.3 kg (201 lb 4.5 oz)   SpO2 92%   BMI 25.84 kg/m²   24HR INTAKE/OUTPUT:    Intake/Output Summary (Last 24 hours) at 2/24/2024 1444  Last data filed at 2/24/2024 1414  Gross per 24 hour   Intake 3507.88 ml   Output 1760 ml   Net 1747.88 ml       Constitutional: Looks comfortable   Respiratory:  Few Rhonchi   Gastrointestinal:  No  tenderness.  Normal Bowel Sounds  Cardiovascular:  S1, S2 RRR   Edema:  Lower Extremity has no edema    DATA:    CBC:  Lab Results   Component Value Date/Time    WBC 5.3 02/24/2024 04:27 AM    RBC 4.08 02/24/2024 04:27 AM    HGB 10.8 
Department of Internal Medicine  Nephrology Progress Note          REASON FOR CONSULTATION:  Hyponatremia.     HISTORY OF PRESENTING ILLNESS:  He is a 53-year-old Afro-American male with past medical history significant for hypertension, depression, traumatic brain injury, brought to ER after family found him confused, disoriented.  Apparently, the patient fell and family noticed foaming from his mouth.  911 was called and EMT found the patient unresponsive.  At the time of presentation to the emergency room, the patient was confused, disoriented.  Routine labs showed sodium of 105, and the patient was admitted for further workup and management.  A Renal consultation was called with above-mentioned reason.  The patient received 100 cc of 3% saline and admitted in ICU for continuous infusion.  At the time of consultation, the patient is alert, awake, and oriented x2, which is much better mental status from last night.  Early morning, sodium was 115 and the patient was taken off the 3% hypertonic saline.      Events noted.  Labs reviewed .   More awake and oriented .    Does not feel well but alert awake and better orientation .     REVIEW OF SYSTEMS:  No CP/SOB, feels weak /tired  .   No confusion  .     No family present .     Physical Exam:    VITALS:  BP 94/67   Pulse 100   Temp 99.7 °F (37.6 °C) (Oral)   Resp 20   Ht 1.88 m (6' 2\")   Wt 84 kg (185 lb 3 oz)   SpO2 95%   BMI 23.78 kg/m²   24HR INTAKE/OUTPUT:    Intake/Output Summary (Last 24 hours) at 2/25/2024 1453  Last data filed at 2/25/2024 1326  Gross per 24 hour   Intake 2306.91 ml   Output 4180 ml   Net -1873.09 ml         Constitutional: Looks comfortable   Respiratory:  Few Rhonchi   Gastrointestinal:  No  tenderness.  Normal Bowel Sounds  Cardiovascular:  S1, S2 RRR   Edema:  Lower Extremity has no edema    DATA:    CBC:  Lab Results   Component Value Date/Time    WBC 5.3 02/24/2024 04:27 AM    RBC 4.08 02/24/2024 04:27 AM    HGB 10.9 02/25/2024 
Department of Internal Medicine  Nephrology Progress Note          REASON FOR CONSULTATION:  Hyponatremia.     HISTORY OF PRESENTING ILLNESS:  He is a 53-year-old Afro-American male with past medical history significant for hypertension, depression, traumatic brain injury, brought to ER after family found him confused, disoriented.  Apparently, the patient fell and family noticed foaming from his mouth.  911 was called and EMT found the patient unresponsive.  At the time of presentation to the emergency room, the patient was confused, disoriented.  Routine labs showed sodium of 105, and the patient was admitted for further workup and management.  A Renal consultation was called with above-mentioned reason.  The patient received 100 cc of 3% saline and admitted in ICU for continuous infusion.  At the time of consultation, the patient is alert, awake, and oriented x2, which is much better mental status from last night.  Early morning, sodium was 115 and the patient was taken off the 3% hypertonic saline.      HPI:  Breathing comfortably.  No CP.  Nursing reports that patient is still \"slightly off\".  ROS:  In bed.  PMFSH:  medications reviewed.    Physical Exam:    VITALS:  BP (!) 110/58   Pulse 85   Temp 98.1 °F (36.7 °C) (Oral)   Resp 25   Ht 1.88 m (6' 2\")   Wt 83 kg (182 lb 15.7 oz)   SpO2 92%   BMI 23.49 kg/m²   24HR INTAKE/OUTPUT:    Intake/Output Summary (Last 24 hours) at 2/26/2024 1117  Last data filed at 2/26/2024 1112  Gross per 24 hour   Intake 3154.1 ml   Output 3375 ml   Net -220.9 ml         Constitutional: Looks comfortable   Respiratory:  Few Rhonchi   Gastrointestinal:  No  tenderness.  Normal Bowel Sounds  Cardiovascular:  S1, S2 RRR   Edema:  Lower Extremity has no edema    DATA:    CBC:  Lab Results   Component Value Date/Time    WBC 5.1 02/26/2024 05:55 AM    RBC 3.74 02/26/2024 05:55 AM    HGB 10.3 02/26/2024 05:55 AM    HCT 29.1 02/26/2024 05:55 AM    MCV 77.7 02/26/2024 05:55 AM    [facility] 
Department of Internal Medicine  Nephrology Progress Note          REASON FOR CONSULTATION:  Hyponatremia.     HISTORY OF PRESENTING ILLNESS:  He is a 53-year-old Afro-American male with past medical history significant for hypertension, depression, traumatic brain injury, brought to ER after family found him confused, disoriented.  Apparently, the patient fell and family noticed foaming from his mouth.  911 was called and EMT found the patient unresponsive.  At the time of presentation to the emergency room, the patient was confused, disoriented.  Routine labs showed sodium of 105, and the patient was admitted for further workup and management.  A Renal consultation was called with above-mentioned reason.  The patient received 100 cc of 3% saline and admitted in ICU for continuous infusion.  At the time of consultation, the patient is alert, awake, and oriented x2, which is much better mental status from last night.  Early morning, sodium was 115 and the patient was taken off the 3% hypertonic saline.      HPI:  Breathing comfortably.  No CP.  Serum sodium better today  ROS:  In bed.  PMFSH:  medications reviewed.    Physical Exam:    VITALS:  /89   Pulse (!) 108   Temp 98.7 °F (37.1 °C) (Oral)   Resp 21   Ht 1.88 m (6' 2\")   Wt 81.9 kg (180 lb 8.9 oz)   SpO2 96%   BMI 23.18 kg/m²   24HR INTAKE/OUTPUT:    Intake/Output Summary (Last 24 hours) at 2/29/2024 1526  Last data filed at 2/29/2024 1423  Gross per 24 hour   Intake 2607.4 ml   Output 350 ml   Net 2257.4 ml         Constitutional: Looks comfortable   Respiratory:  Few Rhonchi   Gastrointestinal:  No  tenderness.  Normal Bowel Sounds  Cardiovascular:  S1, S2 RRR   Edema:  Lower Extremity has no edema    DATA:    CBC:  Lab Results   Component Value Date/Time    WBC 8.7 02/29/2024 06:06 AM    RBC 3.42 02/29/2024 06:06 AM    HGB 9.4 02/29/2024 06:06 AM    HCT 26.3 02/29/2024 06:06 AM    MCV 77.0 02/29/2024 06:06 AM    [facility] 27.5 02/29/2024 06:06 AM    
Department of Internal Medicine  Nephrology Progress Note          REASON FOR CONSULTATION:  Hyponatremia.     HISTORY OF PRESENTING ILLNESS:  He is a 53-year-old Afro-American male with past medical history significant for hypertension, depression, traumatic brain injury, brought to ER after family found him confused, disoriented.  Apparently, the patient fell and family noticed foaming from his mouth.  911 was called and EMT found the patient unresponsive.  At the time of presentation to the emergency room, the patient was confused, disoriented.  Routine labs showed sodium of 105, and the patient was admitted for further workup and management.  A Renal consultation was called with above-mentioned reason.  The patient received 100 cc of 3% saline and admitted in ICU for continuous infusion.  At the time of consultation, the patient is alert, awake, and oriented x2, which is much better mental status from last night.  Early morning, sodium was 115 and the patient was taken off the 3% hypertonic saline.      HPI:  Breathing comfortably.  No CP.  Serum sodium improved more.  ROS:  In bed.  PMFSH:  medications reviewed.    Physical Exam:    VITALS:  /73   Pulse 100   Temp 98.8 °F (37.1 °C) (Axillary)   Resp 24   Ht 1.88 m (6' 2\")   Wt 83.2 kg (183 lb 6.8 oz)   SpO2 100%   BMI 23.55 kg/m²   24HR INTAKE/OUTPUT:    Intake/Output Summary (Last 24 hours) at 2/27/2024 1715  Last data filed at 2/27/2024 1449  Gross per 24 hour   Intake 2843.16 ml   Output 3985 ml   Net -1141.84 ml         Constitutional: Looks comfortable   Respiratory:  Few Rhonchi   Gastrointestinal:  No  tenderness.  Normal Bowel Sounds  Cardiovascular:  S1, S2 RRR   Edema:  Lower Extremity has no edema    DATA:    CBC:  Lab Results   Component Value Date/Time    WBC 5.1 02/26/2024 05:55 AM    RBC 3.74 02/26/2024 05:55 AM    HGB 9.5 02/27/2024 04:00 AM    HCT 26.6 02/27/2024 04:00 AM    MCV 77.7 02/26/2024 05:55 AM    [facility] 27.6 02/26/2024 05:55 
Department of Internal Medicine  Nephrology Progress Note          REASON FOR CONSULTATION:  Hyponatremia.     HISTORY OF PRESENTING ILLNESS:  He is a 53-year-old Afro-American male with past medical history significant for hypertension, depression, traumatic brain injury, brought to ER after family found him confused, disoriented.  Apparently, the patient fell and family noticed foaming from his mouth.  911 was called and EMT found the patient unresponsive.  At the time of presentation to the emergency room, the patient was confused, disoriented.  Routine labs showed sodium of 105, and the patient was admitted for further workup and management.  A Renal consultation was called with above-mentioned reason.  The patient received 100 cc of 3% saline and admitted in ICU for continuous infusion.  At the time of consultation, the patient is alert, awake, and oriented x2, which is much better mental status from last night.  Early morning, sodium was 115 and the patient was taken off the 3% hypertonic saline.      HPI:  Breathing comfortably.  No CP.  Serum sodium lower today  ROS:  In bed.  PMFSH:  medications reviewed.    Physical Exam:    VITALS:  /73   Pulse (!) 104   Temp 97.8 °F (36.6 °C) (Oral)   Resp 27   Ht 1.88 m (6' 2\")   Wt 81.1 kg (178 lb 12.7 oz)   SpO2 90%   BMI 22.96 kg/m²   24HR INTAKE/OUTPUT:    Intake/Output Summary (Last 24 hours) at 2/28/2024 1155  Last data filed at 2/28/2024 1113  Gross per 24 hour   Intake 960 ml   Output 1900 ml   Net -940 ml         Constitutional: Looks comfortable   Respiratory:  Few Rhonchi   Gastrointestinal:  No  tenderness.  Normal Bowel Sounds  Cardiovascular:  S1, S2 RRR   Edema:  Lower Extremity has no edema    DATA:    CBC:  Lab Results   Component Value Date/Time    WBC 7.7 02/28/2024 06:23 AM    RBC 3.62 02/28/2024 06:23 AM    HGB 10.0 02/28/2024 06:23 AM    HCT 27.9 02/28/2024 06:23 AM    MCV 77.0 02/28/2024 06:23 AM    MCH 27.7 02/28/2024 06:23 AM    
Department of Internal Medicine  Nephrology Progress Note          REASON FOR CONSULTATION:  Hyponatremia.     HISTORY OF PRESENTING ILLNESS:  He is a 53-year-old Afro-American male with past medical history significant for hypertension, depression, traumatic brain injury, brought to ER after family found him confused, disoriented.  Apparently, the patient fell and family noticed foaming from his mouth.  911 was called and EMT found the patient unresponsive.  At the time of presentation to the emergency room, the patient was confused, disoriented.  Routine labs showed sodium of 105, and the patient was admitted for further workup and management.  A Renal consultation was called with above-mentioned reason.  The patient received 100 cc of 3% saline and admitted in ICU for continuous infusion.  At the time of consultation, the patient is alert, awake, and oriented x2, which is much better mental status from last night.  Early morning, sodium was 115 and the patient was taken off the 3% hypertonic saline.      HPI:  Breathing comfortably..  No CP.  Serum sodium normal today.  ROS:  In bed.  PMFSH:  medications reviewed.    Physical Exam:    VITALS:  /87   Pulse 98   Temp 97.6 °F (36.4 °C) (Oral)   Resp 26   Ht 1.88 m (6' 2\")   Wt 104.4 kg (230 lb 2.6 oz)   SpO2 96%   BMI 29.55 kg/m²   24HR INTAKE/OUTPUT:  No intake or output data in the 24 hours ending 03/02/24 1955      Constitutional: Looks comfortable   Respiratory:  Few Rhonchi   Gastrointestinal:  No  tenderness.  Normal Bowel Sounds  Cardiovascular:  S1, S2 RRR   Edema:  Lower Extremity has no edema    DATA:    CBC:  Lab Results   Component Value Date/Time    WBC 11.5 03/02/2024 06:13 AM    RBC 3.33 03/02/2024 06:13 AM    HGB 9.3 03/02/2024 06:13 AM    HCT 26.1 03/02/2024 06:13 AM    MCV 78.4 03/02/2024 06:13 AM    MCH 27.8 03/02/2024 06:13 AM    MCHC 35.5 03/02/2024 06:13 AM    RDW 17.0 03/02/2024 06:13 AM     03/02/2024 06:13 AM    MPV 9.4 
Department of Internal Medicine  Nephrology Progress Note          REASON FOR CONSULTATION:  Hyponatremia.     HISTORY OF PRESENTING ILLNESS:  He is a 53-year-old Afro-American male with past medical history significant for hypertension, depression, traumatic brain injury, brought to ER after family found him confused, disoriented.  Apparently, the patient fell and family noticed foaming from his mouth.  911 was called and EMT found the patient unresponsive.  At the time of presentation to the emergency room, the patient was confused, disoriented.  Routine labs showed sodium of 105, and the patient was admitted for further workup and management.  A Renal consultation was called with above-mentioned reason.  The patient received 100 cc of 3% saline and admitted in ICU for continuous infusion.  At the time of consultation, the patient is alert, awake, and oriented x2, which is much better mental status from last night.  Early morning, sodium was 115 and the patient was taken off the 3% hypertonic saline.      HPI:  Breathing comfortably..  No CP.  Serum sodium normal today.  ROS:  In bed.  PMFSH:  medications reviewed.    Physical Exam:    VITALS:  BP (!) 137/94   Pulse 98   Temp 98.2 °F (36.8 °C) (Oral)   Resp 20   Ht 1.88 m (6' 2\")   Wt 89.2 kg (196 lb 10.4 oz)   SpO2 95%   BMI 25.25 kg/m²   24HR INTAKE/OUTPUT:    Intake/Output Summary (Last 24 hours) at 3/3/2024 1818  Last data filed at 3/3/2024 1604  Gross per 24 hour   Intake 844.66 ml   Output 0 ml   Net 844.66 ml         Constitutional: Looks comfortable   Respiratory:  Few Rhonchi   Gastrointestinal:  No  tenderness.  Normal Bowel Sounds  Cardiovascular:  S1, S2 RRR   Edema:  Lower Extremity has no edema    DATA:    CBC:  Lab Results   Component Value Date/Time    WBC 8.9 03/03/2024 06:12 AM    RBC 3.46 03/03/2024 06:12 AM    HGB 9.4 03/03/2024 06:12 AM    HCT 27.5 03/03/2024 06:12 AM    MCV 79.6 03/03/2024 06:12 AM    MCH 27.1 03/03/2024 06:12 AM    
Department of Internal Medicine  Nephrology Progress Note          REASON FOR CONSULTATION:  Hyponatremia.     HISTORY OF PRESENTING ILLNESS:  He is a 53-year-old Afro-American male with past medical history significant for hypertension, depression, traumatic brain injury, brought to ER after family found him confused, disoriented.  Apparently, the patient fell and family noticed foaming from his mouth.  911 was called and EMT found the patient unresponsive.  At the time of presentation to the emergency room, the patient was confused, disoriented.  Routine labs showed sodium of 105, and the patient was admitted for further workup and management.  A Renal consultation was called with above-mentioned reason.  The patient received 100 cc of 3% saline and admitted in ICU for continuous infusion.  At the time of consultation, the patient is alert, awake, and oriented x2, which is much better mental status from last night.  Early morning, sodium was 115 and the patient was taken off the 3% hypertonic saline.      HPI:  Increased O2 requirement.  No CP.  Serum sodium better today  ROS:  In bed.  PMFSH:  medications reviewed.    Physical Exam:    VITALS:  BP (!) 124/98   Pulse 91   Temp 97.7 °F (36.5 °C) (Oral)   Resp 22   Ht 1.88 m (6' 2\")   Wt 81.6 kg (179 lb 14.3 oz)   SpO2 98%   BMI 23.10 kg/m²   24HR INTAKE/OUTPUT:    Intake/Output Summary (Last 24 hours) at 3/1/2024 1327  Last data filed at 2/29/2024 1423  Gross per 24 hour   Intake 515.46 ml   Output --   Net 515.46 ml         Constitutional: Looks comfortable   Respiratory:  Few Rhonchi   Gastrointestinal:  No  tenderness.  Normal Bowel Sounds  Cardiovascular:  S1, S2 RRR   Edema:  Lower Extremity has no edema    DATA:    CBC:  Lab Results   Component Value Date/Time    WBC 11.1 03/01/2024 05:21 AM    RBC 3.79 03/01/2024 05:21 AM    HGB 10.4 03/01/2024 05:21 AM    HCT 29.7 03/01/2024 05:21 AM    MCV 78.5 03/01/2024 05:21 AM    MCH 27.3 03/01/2024 05:21 AM    
Evelyn RN called and report given. Pt's mom, Gaby, already updated on new room placement. All pt's belongings packed and sent with pt. All questions answered, transfer complete.   
INPATIENT CONSULTATION:    IDENTIFYING DATA/REASON FOR CONSULTATION   PATIENT:  Adama Tello  MRN:  [id number]  ADMIT DATE: 2/22/2024  TIME OF EVALUATION: 2/24/2024 8:38 AM  HOSPITAL STAY:   LOS: 2 days   CONSULTING PHYSICIAN:  REASON FOR CONSULTATION:    Subjective:    -No complaints. Discussed with RN-Parish Lora yesterday. Patient was on a clear liquid diet.     MEDICATIONS   SCHEDULED:  sodium chloride flush, 5-40 mL, 2 times per day  sodium chloride flush, 5-40 mL, 2 times per day  amLODIPine, 10 mg, Daily  valsartan, 320 mg, Daily  cefepime, 2,000 mg, Q8H  doxycycline (VIBRAMYCIN) IV, 100 mg, Q12H      FLUIDS/DRIPS:     sodium chloride 5 mL/hr at 02/23/24 1904    sodium chloride      pantoprazole 8 mg/hr (02/24/24 0557)    dextrose 100 mL/hr at 02/24/24 0557     PRNs: sodium chloride flush, 5-40 mL, PRN  sodium chloride, , PRN  magnesium sulfate, 2,000 mg, PRN  ondansetron, 4 mg, Q8H PRN   Or  ondansetron, 4 mg, Q6H PRN  polyethylene glycol, 17 g, Daily PRN  acetaminophen, 650 mg, Q6H PRN   Or  acetaminophen, 650 mg, Q6H PRN  albuterol, 2.5 mg, Q2H PRN  sodium chloride flush, 5-40 mL, PRN  sodium chloride, 25 mL, PRN  potassium chloride, 20 mEq, PRN   Or  potassium chloride, 10 mEq, PRN  zolpidem, 10 mg, Nightly PRN  melatonin, 6 mg, Nightly PRN      ALLERGIES:  He [unfilled]      PHYSICAL EXAM   [unfilled]   I/O last 3 completed shifts:  In: 3772.1 [P.O.:300; I.V.:2087.6; IV Piggyback:1384.5]  Out: 6000 [Urine:5825; Stool:175]  Oxygen Therapy:  @[id number])@  @[id number])@  @[id number])@    Physical Exam:  Gen: Resting in bed, NAD   CV: RRR no MRG   Pul: CTAB   Abd: Good bowel sounds throughout, no scars, soft, NT/ND, no masses, no HSM   Ext: No edema   Neuro: No asterixis   Skin: No jaundice, spider angiomas, torres erythema     LABS AND IMAGING     Recent Results (from the past 24 hour(s))   Hemoglobin and Hematocrit    Collection Time: 02/23/24 11:11 AM   Result Value Ref 
INPATIENT CONSULTATION:    IDENTIFYING DATA/REASON FOR CONSULTATION   PATIENT:  Adama Tello  MRN:  [id number]  ADMIT DATE: 2/22/2024  TIME OF EVALUATION: 2/27/2024 6:32 AM  HOSPITAL STAY:   LOS: 5 days   CONSULTING PHYSICIAN: Stephen Lira MD   REASON FOR CONSULTATION: Anemia    Subjective:    Patient seen and examined in follow-up. Patient reports generalized malaise and body aches.  He denies any abdominal pain.  He denies any melena and reports producing a soft formed bowel movement yesterday.  According to RN, he tolerated mechanical soft diet.    MEDICATIONS   SCHEDULED:  pantoprazole, 40 mg, QAM AC  vancomycin, 1,000 mg, Q12H  vancomycin (VANCOCIN) intermittent dosing (placeholder), , RX Placeholder  sodium chloride flush, 5-40 mL, 2 times per day  sodium chloride flush, 5-40 mL, 2 times per day  amLODIPine, 10 mg, Daily  valsartan, 320 mg, Daily  cefepime, 2,000 mg, Q8H  doxycycline (VIBRAMYCIN) IV, 100 mg, Q12H      FLUIDS/DRIPS:     sodium chloride 50 mL/hr at 02/26/24 1810    sodium chloride Stopped (02/24/24 0815)    sodium chloride       PRNs: sodium phosphate 13.29 mmol in sodium chloride 0.9 % 250 mL IVPB, 0.16 mmol/kg, PRN   Or  sodium phosphate 26.55 mmol in sodium chloride 0.9 % 250 mL IVPB, 0.32 mmol/kg, PRN  guaiFENesin-dextromethorphan, 5 mL, Q4H PRN  sodium chloride flush, 5-40 mL, PRN  sodium chloride, , PRN  magnesium sulfate, 2,000 mg, PRN  ondansetron, 4 mg, Q8H PRN   Or  ondansetron, 4 mg, Q6H PRN  polyethylene glycol, 17 g, Daily PRN  acetaminophen, 650 mg, Q6H PRN   Or  acetaminophen, 650 mg, Q6H PRN  albuterol, 2.5 mg, Q2H PRN  sodium chloride flush, 5-40 mL, PRN  sodium chloride, 25 mL, PRN  potassium chloride, 20 mEq, PRN   Or  potassium chloride, 10 mEq, PRN  zolpidem, 10 mg, Nightly PRN  melatonin, 6 mg, Nightly PRN      ALLERGIES:  No Known Allergies      PHYSICAL EXAM     Vitals:    02/27/24 0400 02/27/24 0500 02/27/24 0549 02/27/24 0600   BP: 122/86 126/81  134/82   Pulse: 90 
Medication Reconciliation    List of medications patient is currently taking is complete.     Source of information: 1. Conversation with patient/RN                                      2. EPIC records      Isak Stein RPH, PharmD, BCPS  2/23/2024 2:15 PM              
Most recent sodium level only up to 106 after receiving 100 ml hypertonic saline bolus. Potassium also critically low at 2.76. Phlebotomist stated she had a had time obtaining blood on patient. Orders for BMP Q4 hours. Agustina Doll NP notified, order placed for PICC line. Dr. Wade also paged and updated on labs. Is okay with PICC being placed and replacement ordered for potassium and wants continuous dose of hypertonic saline started at 25 ml/hr.  Does not want to be notified unless next sodium level is above 108. Wants sodium level to increase no more than 1 point every 2 hours. Mother Gaby called and consent obtained for PICC placement. Also updated on pt status and went over admission questions. She has requested , pallative care and pastoral care be consulted because pt has been getting very frustrated with his declining health since the fall and head injury he sustained last March. She states he does not take care of himself and barely ever bathes and he is now living with her. PICC RN notified and coming in to place PICC.    Shae Guerrero, BSN, CCRN  
Occupational Therapy  Adama Tello  2/28/2024  [id number]    RN asking for assistance to get pt OOB and into recliner. Pt able to complete bed mob and fxl transfer to/from stedy w/ Min A. DEP for safe mob from bed>recliner via stedy. Pt on 10L high flow and desatting to 75% SPO2, O2 increased to 11L and pt edu on PLB. He was able to recover to low 90s, O2 decreased back down to 10L. Pt maintaining above 90% SPO2 while seated in recliner. RN informed of vitals after the session.    Time In: 1305    Time Out: 1315    Charges: x1 TA    Electronically signed by Chris Nugent, OTR/L [id number] on 2/28/24 at 1:44 PM EST    
Occupational Therapy  Facility/Department: [address] PROGRESSIVE CARE  Occupational Therapy Daily Assessment    Name: Adama Tello  : 1970  MRN: [id number]  Date of Service: 2024    Discharge Recommendations:  Patient would benefit from continued therapy after discharge, 3-5 sessions per week  OT Equipment Recommendations  Other: defer to DC facility     Adama Tello scored a 17/24 on the AM-PAC ADL Inpatient form. Current research shows that an AM-PAC score of 17 or less is typically not associated with a discharge to the patient's home setting. Based on the patient's AM-PAC score and their current ADL deficits, it is recommended that the patient have 3-5 sessions per week of Occupational Therapy at d/c to increase the patient's independence.  Please see assessment section for further patient specific details.    If patient discharges prior to next session this note will serve as a discharge summary.  Please see below for the latest assessment towards goals.      Patient Diagnosis(es): The primary encounter diagnosis was Altered mental status, unspecified altered mental status type. Diagnoses of Hyponatremia, Hypomagnesemia, Pneumonia due to infectious organism, unspecified laterality, unspecified part of lung, and Thrombocytopenia (HCC) were also pertinent to this visit.  Past Medical History:  has a past medical history of Esophageal achalasia, GERD (gastroesophageal reflux disease), Hypertension, Hypertension, Marfan syndrome, and Traumatic brain injury (HCC).  Past Surgical History:  has a past surgical history that includes Upper gastrointestinal endoscopy (N/A, 2023); Aortic root replacement; and craniotomy.           Assessment   Performance deficits / Impairments: Decreased functional mobility ;Decreased strength;Decreased endurance;Decreased ADL status;Decreased safe awareness;Decreased high-level IADLs;Decreased balance  Assessment: 53 y.o. male admitted 2024 with AMS and being found 
Occupational Therapy Attempt Note  Adama Tello  1970  [id number]    OT to pt's room to see for tx session this date, however pt declining therapy at this time. Reports he feels like he is at his baseline and would like to eat his breakfast at this time. Pt to be discharged home this date with mother. Will attempt back as therapy schedule allows. If pt is discharged prior to next therapy session please refer to last therapy note as discharge summary.     Electronically signed by PEDRO Cox on 3/5/2024 at 9:20 AM   
Orders from Dr. Wade to stop 3% saline gtt and start D5W at 50 cc/hr. See MAR.    Electronically signed by Moshe Croft RN on 2/23/2024 at 9:13 AM    
Patient to return home with mother. Patient's mother informed RN that she would be unable to provide a ride home today but could arrange a ride home for the patient tomorrow, 3/5. RN notified MD Soraya via [employer]. MD aware and will leave discharge order in place but is OK with actual discharge tomorrow, 3/5.   
Patient's Q2 sodium increased from 106 mmol/L to 115 mmol/L. 3% saline gtt held per order. Secure Message sent to Dr. Wade.     Electronically signed by Moshe Croft RN on 2/23/2024 at 8:54 AM    
Patient's mother, Gaby, called for updates. All questions answered at this time.    Electronically signed by Moshe Croft RN on 2/23/2024 at 8:17 AM    
Patient's repeat sodium 118 mmol/L. Secure Message sent to Dr. Wade. Orders to change D5W to 75 cc/hr. See MAR.    Electronically signed by Moshe Croft RN on 2/23/2024 at 12:24 PM    
Patient's sodium decreased from 116 to 113. Secure message sent to Dr. Wade. See new orders  
Physical Therapy  Attempt    24    Name: Adama Tello   : 1970    MRN: [id number]    Patient unable to be seen by PT as patient declines participation in therapy this date, despite encouragement. Pt states that he would be willing to practice ambulation distance, however does not have his shoes - which reports would help stability with ambulation without a walker. Denies need for a RW. Anticipate d/c home with PRN A from mother. Will monitor any further needs prior to discharge today.    If patient discharges prior to next session, please refer to previous PT note for d/c summary.    Electronically signed by Anirudh Del Angel, PT on 3/5/2024 at 9:42 AM        
Physical Therapy  Facility/Department: [address] PROGRESSIVE CARE  Daily Treatment Note  NAME: Adama Tello  : 1970  MRN: [id number]    Date of Service: 3/4/2024    Discharge Recommendations:  Continue to assess pending progress, Home with assist PRN   PT Equipment Recommendations  Other: Will cont to monitor for any equipt needs.  Adama Tello scored a 20/24 on the AM-PAC short mobility form.  At this time, no further PT is recommended upon discharge.       Patient Diagnosis(es): The primary encounter diagnosis was Altered mental status, unspecified altered mental status type. Diagnoses of Hyponatremia, Hypomagnesemia, Pneumonia due to infectious organism, unspecified laterality, unspecified part of lung, and Thrombocytopenia (HCC) were also pertinent to this visit.    Assessment   Assessment: Pt appears more alert/engaged with oob activities.  Pt liliam to eob Supervision.  Transfers/Amb functional distances with/without assist device SBA.  No LE buckling/giving way.  Gait steady.  Pt declines need for assist device upon d/c.  Pt perseverates on wanting to wear shoes (feet tender); mother to bring them in.  Pt/mother agree with d/c to  her home (1st floor bed/bath); pt declines need for Home PT.  Activity Tolerance: Patient tolerated treatment well  Other: Will cont to monitor for any equipt needs.     Plan    Physical Therapy Plan  General Plan: 3-5 times per week  Current Treatment Recommendations: Functional mobility training;Transfer training;Gait training;Endurance training;Safety education & training;Patient/Caregiver education & training     Restrictions  Restrictions/Precautions  Restrictions/Precautions: Fall Risk  Position Activity Restriction  Other position/activity restrictions: 6L O2 via NC, strict NPO     Subjective    Subjective  Subjective: Pt/family agreeable to PT Rx.  Feeling better.  Wants to have shoes on (none at bedside; mother to bring in tomorrow).  Plans to go to mother's home upon 
Physical Therapy  Facility/Department: [address] ICU  Physical Therapy Initial Assessment    Name: Adama Tello  : 1970  MRN: [id number]  Date of Service: 2024    Discharge Recommendations:  Continue to assess pending progress, Patient would benefit from continued therapy after discharge   PT Equipment Recommendations  Other: will monitor needs      Patient Diagnosis(es): The primary encounter diagnosis was Altered mental status, unspecified altered mental status type. Diagnoses of Hyponatremia, Hypomagnesemia, Pneumonia due to infectious organism, unspecified laterality, unspecified part of lung, and Thrombocytopenia (HCC) were also pertinent to this visit.  Past Medical History:  has a past medical history of Esophageal achalasia, GERD (gastroesophageal reflux disease), Hypertension, Hypertension, Marfan syndrome, and Traumatic brain injury (HCC).  Past Surgical History:  has a past surgical history that includes Upper gastrointestinal endoscopy (N/A, 2023); Aortic root replacement; and craniotomy.    Assessment   Body Structures, Functions, Activity Limitations Requiring Skilled Therapeutic Intervention: Decreased functional mobility   Assessment: Pt presents with decreased functional mobility after admission and diagnosed with \"Hyponatremia, altered mental status\" after being found down from family.  Pt with PMH including hypertension, depression, traumatic brain injury\".  Prior to admit, pt reported being independent at home living with his mother in apt setting.  This date, pt is limited by c/o pain and pt not very motivated to mobilize this date.  Pt needed Mod A for supine<>sit bed mobility, Min A x 2 person/HHA of 2 for side stepping eob only this date.  Unclear of further medical needs at this time.  At present time, ongoing therapy is warranted.  Will cont to follow and make ongoing recs based on pt's progress.  Will follow.  Adama Tello scored a 10/24 on the AM-PAC short mobility form. 
Physical Therapy  PT to bedside to cont current POC.  PT requiring gentle/ongoing encourage for brief eob/oob activities.  Delayed processing; reports overall feeling ill.  Pt transfer to/from EOB with Mod assist.  EOB : CGA.  Pt able to transfer with handheld assist x 2; took 3-4 small steps towards HOB.  Declined any further oob activities or up to chair.  Pt returned to bed with Mod assist.  Call light, all needs in reach and bed alarm engaged.  Nursing informed.  Cont to anticipate need SNF setting upon d/c.  Will cont to monitor pt's progress.  An Steel, PT     Ther Activity 15 min    
Pt c/o pain at his IV site. RN noted that medications were infusing. Pt noted to have tenderness at site and with flushing. RN also noted that pt had a darkened streak going up his R FA.   
Pt discharged at this time. Reviewed all discharge instructions and follow up appts with patient. Patient states that he understands. Ivs removed at this time without complications. Pt does not voice any questions or concerns at this time. Belongings in hand. [facility]ations received from [facility] at time of discharge. Patient assisted to vehicle at this time via wheelchair. Patient to be drove home by mom. Patient tolerated well.    
Pt had H/H sent down with Sodium at 0155. H/H did not result. Both a purple top and a green/yellow top were labelled and sent to the lab together. [place]oke with lab at [id number] \"Elizabeth/Rosalio\" who stated that the lab never received the h/h. H/H was drawn and sent with 0600 labs.   
Pt up to side of the bed, and set off the bed alarm. Pt urinated on the floor. Total bed change and linen change at this time. Pt tolerated well.   
Pt's Na 116. On-call nephrologist, Dr. Mijares, called by this RN to update. Per Dr. Mijares, D5W increased from 75 mL/hr to 100 mL/hr. Na to be trended q4h. Collect urine Na and osmolality. See orders.    Contact nephrology if Na outside 113-117 overnight.  
Pt's wife said that pt has an appointment at the eye doctor for eye injections next week and she wants to speak with SW about the appointment.     RN provided pts wife with contact information for SW.     No further concerns.   
RN bladder scanned patient this AM - bladder scan volume showed 556 mL of retained urine. Patient was able to void. Post-void residual 105 mL.     RN bladder scanned patient again this evening. 358 mL of urine retained. Patient attempted to urinate but was unsuccessful. RN beryl Lira MD via [employer]. MD aware of this issue and instructed RN to order PRN straight catheterization if bladder scan volume is above 600 mL. RN ordered. Patient denies abdominal discomfort or pain/burning with urination.   
Repeat BMP reviewed:     Latest Reference Range & Units 02/23/24 03:20   Sodium 136 - 145 mmol/L 106 (LL)   Potassium 3.5 - 5.1 mmol/L 2.9 (LL)   Chloride 99 - 110 mmol/L 73 (L)   CO2 21 - 32 mmol/L 19 (L)   BUN,BUNPL 7 - 20 mg/dL 12   Creatinine 0.9 - 1.3 mg/dL 0.8 (L)   Anion Gap 3 - 16  14   Est, Glom Filt Rate >60  >60   Glucose, Random 70 - 99 mg/dL 96   CALCIUM, SERUM, [id number] 8.3 - 10.6 mg/dL 7.8 (L)       Dr. Wade called and notified of sodium level, hypertonic saline increased to 30 ml/hr. Potassium sliding scale orders already in place.    Shae Guerrero, BSN, CCRN  
Secure Message sent to Dr. Wade regarding patient's sodium increasing to 120 mmol/L. Orders to keep D5W rate at 75cc/hr and administer desmopressin PF 1 mcg. See MAR.    Electronically signed by Moshe Croft RN on 2/23/2024 at 3:23 PM    
Shift summary:    Received pt from ED around midnight. Pt very lethargic only withdrawing from pain. Would open eyes spontaneously but would not follow commands or speak. Sodium level only came up from 105 to 106 after receiving hypertonic saline bolus in ED. Dr. Wade notified, hypertonic saline continuous infusion started at 25 ml/hr. Potassium critically low. Sliding scale electrolyte orders placed and orders placed for PICC line. PICC RN came and placed PICC in RUE @ 0430. Mother was called and updated, states she will bring in list of pts home medications when she comes to visit today. Repeat BMP showed no improvement in sodium level still 106, Dr. Wade increased hypertonic saline rate to 30 ml/hr. Current BMP pending. Pt waking up more, tried to get out of bed, confused oriented to person and place only. Was able to pivot to bedside commode and had large liquid black stool. Sent off for c-diff and occult. C-diff negative, fecal occult positive. Dr. Ceja notified, serial H&H and Protonix infusion ordered and GI consulted. Report given to MADELINE Mesa all questions answered.    Shae Guerrero, BSN, CCRN  
Upon dressing change of PICC its noted to be out at the 6cm sara. Message sent to Dr Lira and PICC removed. Site cleaned with chlorhexinine, pressure held and dsd applied. Pt liliam well  
Traumatic brain injury (HCC)        Past Surgical History:    Past Surgical History:   Procedure Laterality Date    AORTIC ROOT REPLACEMENT      CRANIOTOMY      UPPER GASTROINTESTINAL ENDOSCOPY N/A 06/08/2023    EGD DILATION BALLOON performed by Ryan Tracy MD at [facility] ENDOSCOPY       Current Medications:    Outpatient Medications Marked as Taking for the 2/22/24 encounter (Hospital Encounter)   Medication Sig Dispense Refill    amLODIPine (NORVASC) 10 MG tablet Take 1 tablet by mouth daily      pantoprazole (PROTONIX) 20 MG tablet Take 1 tablet by mouth in the morning and at bedtime      zolpidem (AMBIEN) 10 MG tablet Take 1 tablet by mouth nightly as needed for Sleep. Max Daily Amount: 10 mg      chlorproMAZINE (THORAZINE) 10 MG tablet Take 1 tablet by mouth 4 times daily as needed      metoclopramide (REGLAN) 10 MG tablet Take 1 tablet by mouth 4 times daily      sucralfate (CARAFATE) 1 GM/10ML suspension Take 10 mLs by mouth 4 times daily (before meals and nightly)         Allergies:  Patient has no known allergies.    Immunizations :   There is no immunization history on file for this patient.      Social History:    Social History     Tobacco Use    Smoking status: Every Day     Current packs/day: 0.25     Average packs/day: 0.3 packs/day for 5.0 years (1.3 ttl pk-yrs)     Types: Cigarettes    Smokeless tobacco: Never   Substance Use Topics    Alcohol use: Not Currently    Drug use: Not Currently     Social History     Tobacco Use   Smoking Status Every Day    Current packs/day: 0.25    Average packs/day: 0.3 packs/day for 5.0 years (1.3 ttl pk-yrs)    Types: Cigarettes   Smokeless Tobacco Never      Family History : nO dvt NO copd   Marfans syndrome+    REVIEW OF SYSTEMS:      Constitutional:  negative for fevers, chills, night sweats  Eyes:  negative for blurred vision, eye discharge, visual disturbance   HEENT:  negative for hearing loss, ear drainage,nasal congestion  Respiratory:  r cough++ , shortness 
  Social/Functional History  Social/Functional History  Lives With:  (mother)  Type of Home: Apartment  Home Layout: One level  Home Access: Stairs to enter with rails  Entrance Stairs - Number of Steps: 10 YURI  Bathroom Shower/Tub: Tub/Shower unit  Bathroom Toilet: Standard  Home Equipment: None  Has the patient had two or more falls in the past year or any fall with injury in the past year?: No  ADL Assistance: Independent  Homemaking Assistance:  (shares with IADLs)  Ambulation Assistance: Independent (no AD)  Transfer Assistance: Independent  Active : Yes  Occupation: On disability       Objective   Safety Devices  Type of Devices: Bed alarm in place;Call light within reach;Left in bed;Nurse notified;Telesitter in use           ADL  Toileting Skilled Clinical Factors: external catheter, pt declined need to void  Functional Mobility: Unable to assess(comment)  Functional Mobility Skilled Clinical Factors: Pt declining fxl mob this date     Activity Tolerance  Activity Tolerance: Patient limited by endurance  Activity Tolerance Comments: Pt. declined walking due to not liking grippy socks; no shoes available to wear.  Bed mobility  Supine to Sit: Contact guard assistance  Sit to Supine: Contact guard assistance  Scooting: Contact guard assistance  Bed Mobility Comments: HOB elevated, pt declining to sit in recliner this date  Transfers  Sit to stand: Minimal assistance;2 Person assistance  Stand to sit: 2 Person assistance;Minimal assistance  Transfer Comments: to/from      Cognition  Overall Cognitive Status: Exceptions  Arousal/Alertness: Delayed responses to stimuli  Following Commands: Follows one step commands with increased time  Attention Span: Attends with cues to redirect  Safety Judgement: Decreased awareness of need for safety;Decreased awareness of need for assistance  Problem Solving: Decreased awareness of errors  Insights: Decreased awareness of deficits  Initiation: Requires cues for 
116 (LL) 136 - 145 mmol/L    Potassium reflex Magnesium 4.2 3.5 - 5.1 mmol/L    Chloride 86 (L) 99 - 110 mmol/L    CO2 21 21 - 32 mmol/L    Anion Gap 9 3 - 16    Glucose 98 70 - 99 mg/dL    BUN 8 7 - 20 mg/dL    Creatinine 0.9 0.9 - 1.3 mg/dL    Est, Glom Filt Rate >60 >60    Calcium 8.2 (L) 8.3 - 10.6 mg/dL   Hemoglobin and Hematocrit    Collection Time: 02/23/24  6:47 PM   Result Value Ref Range    Hemoglobin 11.7 (L) 13.5 - 17.5 g/dL    Hematocrit 33.1 (L) 40.5 - 52.5 %   Sodium, Urine, Random    Collection Time: 02/23/24  8:15 PM   Result Value Ref Range    Sodium, Ur 28 Not Established mmol/L   Osmolality, Urine    Collection Time: 02/23/24  8:15 PM   Result Value Ref Range    Osmolality, Ur 662 390 - 1070 mOsm/kg   POCT Glucose    Collection Time: 02/23/24  8:58 PM   Result Value Ref Range    POC Glucose 318 (H) 70 - 99 mg/dl    Performed on ACCU-CHEK    POCT Glucose    Collection Time: 02/23/24  8:59 PM   Result Value Ref Range    POC Glucose 123 (H) 70 - 99 mg/dl    Performed on ACCU-CHEK    TSH with Reflex to FT4    Collection Time: 02/23/24  9:00 PM   Result Value Ref Range    TSH Reflex FT4 0.23 (L) 0.27 - 4.20 uIU/mL   Cortisol PM, Total    Collection Time: 02/23/24  9:00 PM   Result Value Ref Range    Cortisol - PM 13.9 3.1 - 16.7 ug/dL   T4, Free    Collection Time: 02/23/24  9:00 PM   Result Value Ref Range    T4 Free 0.8 (L) 0.9 - 1.8 ng/dL   Hemoglobin and Hematocrit    Collection Time: 02/23/24 11:42 PM   Result Value Ref Range    Hemoglobin 11.3 (L) 13.5 - 17.5 g/dL    Hematocrit 31.5 (L) 40.5 - 52.5 %   Sodium    Collection Time: 02/23/24 11:42 PM   Result Value Ref Range    Sodium 116 (LL) 136 - 145 mmol/L   Electrolytes Urine Random    Collection Time: 02/24/24  4:27 AM   Result Value Ref Range    Sodium, Ur 77 Not Established mmol/L    Potassium, Ur 32.9 Not Established mmol/L    Chloride 124 Not Established mmol/L   Urinalysis    Collection Time: 02/24/24  4:27 AM   Result Value Ref Range    
Nightly PRN  melatonin, 6 mg, Nightly PRN          Labs and Imaging   CT ABDOMEN PELVIS W IV CONTRAST Additional Contrast? None    Result Date: 6/8/2023  EXAMINATION: CT OF THE ABDOMEN AND PELVIS WITH CONTRAST 6/8/2023 7:52 pm TECHNIQUE: CT of the abdomen and pelvis was performed with the administration of intravenous contrast. Multiplanar reformatted images are provided for review. Automated exposure control, iterative reconstruction, and/or weight based adjustment of the mA/kV was utilized to reduce the radiation dose to as low as reasonably achievable. COMPARISON: 09/11/2022 HISTORY: ORDERING SYSTEM PROVIDED HISTORY: abd pain, diarrhea TECHNOLOGIST PROVIDED HISTORY: Reason for exam:->abd pain, diarrhea Additional Contrast?->None Decision Support Exception - unselect if not a suspected or confirmed emergency medical condition->Emergency Medical Condition (MA) Reason for Exam: abd pain, diarrhea FINDINGS: Lower Chest: Coarse interstitial findings in the lung bases are again demonstrated compatible with fibrotic lung process.  Overall this appears similar compared to the prior exam. Organs: The liver, pancreas, spleen, kidneys, and adrenals reveal no acute findings. No inflammatory change identified in the gallbladder fossa. GI/Bowel: Liquid stool is present throughout the colon.  The sigmoid colon takes an unusual course towards the right abdomen and there is mild swirling of the mesentery noted without twisting to suggest volvulus.  Loops of colon are mildly distended with gas.  No significant small bowel distension is appreciated.  No wall thickening or inflammatory change.  No pneumatosis or portal venous gas. Pelvis: Mild diffuse bladder wall thickening. Peritoneum/Retroperitoneum: No free air or free fluid.  The aorta is normal in caliber.  The visceral branches are patent. Calcified atheromatous plaque is present.  No lymphadenopathy. Bones/Soft Tissues: Small fat containing umbilical hernia.  Severe 
the administration of intravenous  contrast. Multiplanar reformatted images are provided for review. Automated  exposure control, iterative reconstruction, and/or weight based adjustment of  the mA/kV was utilized to reduce the radiation dose to as low as reasonably  achievable.    COMPARISON:  01/02/2023    HISTORY:  ORDERING SYSTEM PROVIDED HISTORY: - bactremia with h/o Aortic root surgery (  rule out enovascular infection ), worsening oxygen requirement today ( rule  out pulmonary eitioloy )  TECHNOLOGIST PROVIDED HISTORY:  Reason for exam:->- bactremia with h/o Aortic root surgery ( rule out  enovascular infection ), worsening oxygen requirement today ( rule out  pulmonary eitioloy )  Additional Contrast?->None  Reason for Exam: bactremia with h/o Aortic root surgery ( rule out  enovascular infection ), worsening oxygen requirement today ( rule out  pulmonary eitioloy )    FINDINGS:  Mediastinum: No enlarged lymphadenopathy.  Small lymph nodes are scattered.  Esophagus is mildly dilated and filled with fluid..    Heart: Heart size is normal. No pericardial effusion.    Great vessels: the great vessels are patent and unremarkable.  Main pulmonary  artery is mildly dilated at 3.2 cm.  Normal enhancement.    Aorta: Aorta is patent.  Mild atherosclerotic changes in.  The aortic valve  is unremarkable.  Minimal atherosclerotic change of the aortic root.  Minimal  dilation at the origin of the right coronary artery but this is not have the  appearance of a mycotic aneurysm..  Negative for aneurysm or dissection.    Lungs/pleura: Extensive ground-glass opacities bilaterally.  These are  predominantly in the upper lobes but also involve the lower lobes right  middle lobe and the lingula.  Opacity in the right middle lobe is likely  pericardial fat pad.  The trachea and bronchi are patent.    Soft Tissues: Appearance of the liver similar to the previous study.  Adrenal  glands are normal.    Osseous: No suspicious lytic 
time.    Education  Patient Education  Education Given To: Patient  Education Provided Comments: Role of PT, POC, Need to call or assist, EOB, Transfers via Stedy.  Education Method: Verbal  Barriers to Learning: Cognition  Education Outcome: Verbalized understanding;Continued education needed    AM-PAC - Mobility    AM-PAC Basic Mobility - Inpatient   How much help is needed turning from your back to your side while in a flat bed without using bedrails?: None  How much help is needed moving from lying on your back to sitting on the side of a flat bed without using bedrails?: A Lot  How much help is needed moving to and from a bed to a chair?: A Little  How much help is needed standing up from a chair using your arms?: A Little  How much help is needed walking in hospital room?: A Lot  How much help is needed climbing 3-5 steps with a railing?: Total  AM-PAC Inpatient Mobility Raw Score : 15  AM-PAC Inpatient T-Scale Score : 39.45  Mobility Inpatient CMS 0-100% Score: 57.7  Mobility Inpatient CMS G-Code Modifier : CK         Therapy Time   Individual Concurrent Group Co-treatment   Time In 1430         Time Out 1455         Minutes 25                 An Steel, PT           
Lot  How much help is needed for bathing (which includes washing, rinsing, drying)?: A Lot  How much help is needed for toileting (which includes using toilet, bedpan, or urinal)?: A Lot  How much help is needed for putting on and taking off regular upper body clothing?: A Little  How much help is needed for taking care of personal grooming?: None  How much help for eating meals?: None  AM-[facility] Inpatient Daily Activity Raw Score: 17  AM-PAC Inpatient ADL T-Scale Score : 37.26  ADL Inpatient CMS 0-100% Score: 50.11  ADL Inpatient CMS G-Code Modifier : CK    Goals  Short Term Goals  Time Frame for Short Term Goals: Prior to DC:  Short Term Goal 1: Pt will complete ADL transfer with supervision  Short Term Goal 2: Pt will complete functional mobility with supervision  Short Term Goal 3: Pt will tolerate standing > 2 min for functional task with supervision  Short Term Goal 4: Pt will complete toileting with supervision  Short Term Goal 5: Pt will complete LB Dressing with supervision  Patient Goals   Patient goals : to feel better       Therapy Time   Individual Concurrent Group Co-treatment   Time In 1250         Time Out 1320         Minutes 30         Timed Code Treatment Minutes: 15 Minutes     This note to serve as OT d/c summary if pt is d/c-ed prior to next therapy session.    Yanni Louis, OTR/L     
free fluid.  The aorta is normal in caliber.  The visceral branches are patent. Calcified atheromatous plaque is present.  No lymphadenopathy. Bones/Soft Tissues: Small fat containing umbilical hernia.  Severe compression deformity of L2 is again noted.     1.  Findings compatible diarrheal process.  Mild colonic distension is noted with mild mesenteric swirling but no evidence for volvulus.  This may be due to a partial obstructing process due to an underlying internal hernia.  No significant small bowel distension. 2.  Additional chronic and benign findings, as described.       CBC:   Recent Labs     02/29/24 0606 03/01/24 0521 03/02/24 0613   WBC 8.7 11.1* 11.5*   HGB 9.4* 10.4* 9.3*    185 259       BMP:    Recent Labs     02/29/24 0606 02/29/24 2108 03/01/24 0521 03/01/24  0901 03/02/24 0613   *   < > 131* 133* 138   K 3.5  --  4.0  --  3.7   CL 97*  --  96*  --  99   CO2 20*  --  23  --  27   BUN 4*  --  9  --  21*   CREATININE 0.6*  --  0.9  --  1.1   GLUCOSE 100*  --  115*  --  110*    < > = values in this interval not displayed.       Hepatic:   No results for input(s): \"AST\", \"ALT\", \"ALB\", \"BILITOT\", \"ALKPHOS\" in the last 72 hours.  Lipids:   No results found for: \"CHOL\", \"HDL\", \"TRIG\"  Hemoglobin A1C: No results found for: \"LABA1C\"  TSH:   Lab Results   Component Value Date/Time    TSH 0.35 02/23/2024 12:12 PM     Troponin: No results found for: \"TROPONINT\"  Lactic Acid: No results for input(s): \"LACTA\" in the last 72 hours.  BNP:   Recent Labs     02/29/24 0606   PROBNP 2,613*       UA:  Lab Results   Component Value Date/Time    NITRU Negative 02/24/2024 04:27 AM    COLORU Yellow 02/24/2024 04:27 AM    PHUR 6.0 02/24/2024 04:27 AM    WBCUA 5 02/24/2024 04:27 AM    RBCUA 36 02/24/2024 04:27 AM    MUCUS 1+ 01/02/2023 04:26 PM    BACTERIA None Seen 02/24/2024 04:27 AM    CLARITYU Clear 02/24/2024 04:27 AM    SPECGRAV 1.023 02/24/2024 04:27 AM    LEUKOCYTESUR Negative 02/24/2024 04:27 
information, please feel free to contact us [phone].  Thank you for allowing us to participate in the care of Adama Tello.    The note was completed using Dragon voice recognition transcription. Every effort was made to ensure accuracy; however, inadvertent transcription errors may be present despite my best efforts to edit errors.    Jacob REYES    
melena. Hgb stable.  Dysphagia: Patient with history of dysphagia with recent EGD as well as manometry at , with plan for Endoflip 5/28/2024.  Hypovolemic hyponatremia: Improving, will defer management to primary team    RECOMMENDATIONS:    -PPI  -Monitor H&H and stool output  -Outpatient follow-up at  for Endoflip 5/28/2024.    If you have any questions or need any further information, please feel free to contact anyone on our consult team.  Thank you for allowing us to participate in the care of Adama Tello.    Leonel Musa MD  [facility]  
understanding;Continued education needed    AM-PAC - ADL  AM-PAC Daily Activity - Inpatient   How much help is needed for putting on and taking off regular lower body clothing?: A Little  How much help is needed for bathing (which includes washing, rinsing, drying)?: A Little  How much help is needed for toileting (which includes using toilet, bedpan, or urinal)?: A Little  How much help is needed for putting on and taking off regular upper body clothing?: None  How much help is needed for taking care of personal grooming?: A Little  How much help for eating meals?: None  AM-[facility] Inpatient Daily Activity Raw Score: 20  AM-PAC Inpatient ADL T-Scale Score : 42.03  ADL Inpatient CMS 0-100% Score: 38.32  ADL Inpatient CMS G-Code Modifier : CJ    Goals  Short Term Goals  Time Frame for Short Term Goals: Prior to DC: goals ongoing  Short Term Goal 1: Pt will complete ADL transfer with supervision  Short Term Goal 2: Pt will complete functional mobility with supervision  Short Term Goal 3: Pt will tolerate standing > 2 min for functional task with supervision  Short Term Goal 4: Pt will complete toileting with supervision  Short Term Goal 5: Pt will complete LB Dressing with supervision  Patient Goals   Patient goals : to feel better       Therapy Time   Individual Concurrent Group Co-treatment   Time In 1340         Time Out 1410         Minutes 30         Timed Code Treatment Minutes: 30 Minutes       SARKIS Payton/L [id number]     
(LL) 136 - 145 mmol/L   Electrolytes Urine Random    Collection Time: 02/24/24  4:27 AM   Result Value Ref Range    Sodium, Ur 77 Not Established mmol/L    Potassium, Ur 32.9 Not Established mmol/L    Chloride 124 Not Established mmol/L   Urinalysis    Collection Time: 02/24/24  4:27 AM   Result Value Ref Range    Color, UA Yellow Straw/Yellow    Clarity, UA Clear Clear    Glucose, Ur Negative Negative mg/dL    Bilirubin Urine Negative Negative    Ketones, Urine 15 (A) Negative mg/dL    Specific Gravity, UA 1.023 1.005 - 1.030    Blood, Urine MODERATE (A) Negative    pH, UA 6.0 5.0 - 8.0    Protein,  (A) Negative mg/dL    Urobilinogen, Urine 1.0 <2.0 E.U./dL    Nitrite, Urine Negative Negative    Leukocyte Esterase, Urine Negative Negative    Microscopic Examination YES     Urine Type NotGiven    Osmolality    Collection Time: 02/24/24  4:27 AM   Result Value Ref Range    Osmolality 250 (L) 275 - 295 mOsm/kg   Basic Metabolic Panel    Collection Time: 02/24/24  4:27 AM   Result Value Ref Range    Sodium 116 (LL) 136 - 145 mmol/L    Potassium 3.4 (L) 3.5 - 5.1 mmol/L    Chloride 85 (L) 99 - 110 mmol/L    CO2 20 (L) 21 - 32 mmol/L    Anion Gap 11 3 - 16    Glucose 105 (H) 70 - 99 mg/dL    BUN 7 7 - 20 mg/dL    Creatinine 0.8 (L) 0.9 - 1.3 mg/dL    Est, Glom Filt Rate >60 >60    Calcium 7.9 (L) 8.3 - 10.6 mg/dL   CBC    Collection Time: 02/24/24  4:27 AM   Result Value Ref Range    WBC 5.3 4.0 - 11.0 K/uL    RBC 4.08 (L) 4.20 - 5.90 M/uL    Hemoglobin 11.2 (L) 13.5 - 17.5 g/dL    Hematocrit 32.1 (L) 40.5 - 52.5 %    MCV 78.6 (L) 80.0 - 100.0 fL    MCH 27.5 26.0 - 34.0 pg    MCHC 35.0 31.0 - 36.0 g/dL    RDW 16.6 (H) 12.4 - 15.4 %    Platelets 145 135 - 450 K/uL    MPV 9.3 5.0 - 10.5 fL   Microscopic Urinalysis    Collection Time: 02/24/24  4:27 AM   Result Value Ref Range    Bacteria, UA None Seen None Seen /HPF    Hyaline Casts, UA 2 0 - 8 /LPF    WBC, UA 5 0 - 5 /HPF    RBC, UA 36 (H) 0 - 4 /HPF    Epithelial 
Care  [phone]           
Creatinine 0.8 (L) 0.9 - 1.3 mg/dL    Est, Glom Filt Rate >60 >60    Calcium 7.8 (L) 8.3 - 10.6 mg/dL   Magnesium    Collection Time: 02/25/24  6:21 AM   Result Value Ref Range    Magnesium 2.00 1.80 - 2.40 mg/dL   TSH with Reflex to FT4    Collection Time: 02/25/24  9:21 AM   Result Value Ref Range    TSH Reflex FT4 1.85 0.27 - 4.20 uIU/mL   Sodium    Collection Time: 02/25/24  9:21 AM   Result Value Ref Range    Sodium 122 (L) 136 - 145 mmol/L        Imaging/Diagnostics Last 24 Hours   XR CHEST PORTABLE    Result Date: 2/22/2024  EXAMINATION: ONE XRAY VIEW OF THE CHEST 2/22/2024 8:26 pm COMPARISON: None. HISTORY: ORDERING SYSTEM PROVIDED HISTORY: Hypoxia, AMS TECHNOLOGIST PROVIDED HISTORY: Reason for exam:->Hypoxia, AMS Reason for Exam: Hypoxia, AMS FINDINGS: Postsurgical changes are seen in the chest.  Possible small right pleural effusion.  No pneumothorax is seen.  Basilar airspace opacities noted, with streaky right perihilar opacities.     Airspace opacities in the lung bases, with streaky right perihilar opacities, which could represent atelectasis and/or infiltrate. Possible small right pleural effusion.     CT HEAD WO CONTRAST    Result Date: 2/22/2024  EXAMINATION: CT OF THE HEAD WITHOUT CONTRAST  2/22/2024 8:18 pm TECHNIQUE: CT of the head was performed without the administration of intravenous contrast. Automated exposure control, iterative reconstruction, and/or weight based adjustment of the mA/kV was utilized to reduce the radiation dose to as low as reasonably achievable. COMPARISON: 01/02/2023 HISTORY: ORDERING SYSTEM PROVIDED HISTORY: Found down minimally responsive. Remains altered GCA 11. Prior Hx ICH. TECHNOLOGIST PROVIDED HISTORY: Reason for exam:->Found down minimally responsive. Remains altered GCA 11. Prior Hx ICH. Has a \"code stroke\" or \"stroke alert\" been called?->No Decision Support Exception - unselect if not a suspected or confirmed emergency medical condition->Emergency Medical 
Given To: Patient  Education Provided: Role of Therapy;Transfer Training;Equipment  Education Method: Verbal  Barriers to Learning: Cognition  Education Outcome: Verbalized understanding;Continued education needed      Therapy Time   Individual Concurrent Group Co-treatment   Time In 1330         Time Out 1356         Minutes 26         Timed Code Treatment Minutes: 26 Minutes       Bekah Zuñiga PT         
sign off at this time.  Please re-consult should additional ST needs be identified/requested.     MEDICAL OR COGNITIVE/BEHAVIORAL FACTORS WHICH CAN EXACERBATE:   Esophageal comorbidities    Recommended Diet and Intervention 3/1/2024:  Solids: per MD  Liquids: per MD  Meds: per MD  Compensatory Swallowing Strategies: per MD    SHORT TERM DYSPHAGIA GOALS/PLAN OF CARE: Speech therapy for dysphagia tx No further follow-up indicated     Dysphagia Prognosis: [x] Good for oropharyngeal dysphagia  []fair  []guarded []poor    Discharge Recommendations: Do not anticipate need for further speech/dysphagia therapy upon discharge from hospital       TEST DATA  Vision: [x] adequate for dysphagia needs [] Impaired:  Hearing: [x]adequate for dysphagia needs [] Impaired:     Cognitive/behavioral/communication:   Oriented to [x] self [x] place [] date [] situation  [x]alert []lethargic  [x]cooperative []self-limiting   []confused   []distractible []agitated []impulsive  [x]verbally responsive []nonverbal []limited verbal responses  [x]follows one step commands []does not follow dx []follows complex commands  []aphasic []dysarthric  [] other:     Dentition: [x]Adequate []Dentures []Missing Many Teeth []Edentulous []Other:  Vocal Quality: []Normal []Dysphonic  []Aphonic  []Hoarse []Wet [x]Weak []Other:  Volitional Cough: [x]Strong []Weak []Wet []Absent []Congested [] JAHAIRA []Other:  Volitional Swallow:   []Absent  []Delayed [x]Adequate []Required use of drink [] JAHAIRA []Other:    Patient Positioning: Upright in bed     Consistencies Presented:   Thin liquid;   Mildly / nectar thick liquid ;   Moderately / honey thick liquid  Puree food  Minced and moist food   Soft/bite size food  Regular solid food    Oral Mechanism Exam:  [x]WFL []Mild   [] Moderate  []Severe  []To be assessed  Impaired:   []Left side      []Right side    []Labial ROM/Coordination    []Labial Symmetry   []Lingual ROM/Coordination   []Lingual Symmetry  []Gag  []Other: 
ruled out since the antigen present in the sample  may be below the detection limit of the test.  Normal Range:Presumptive Negative     Legionella Antigen, Urine    Collection Time: 02/23/24  1:35 AM    Specimen: Urine, clean catch   Result Value Ref Range    L. pneumophila Serogp 1 Ur Ag       Presumptive Negative  No Legionella pneumophila serogroup 1 antigens detected.  A negative result does not exclude infection with  Legionella pneumophila serogroup 1 nor does it rule out  other microbial-caused respiratory infections or  disease caused by other serogroups of  Legionella pneumophila.  Normal Range: Presumptive Negative     Osmolality, Urine    Collection Time: 02/23/24  1:35 AM   Result Value Ref Range    Osmolality, Ur 429 390 - 1070 mOsm/kg   Basic Metabolic Panel w/ Reflex to MG    Collection Time: 02/23/24  3:20 AM   Result Value Ref Range    Sodium 106 (LL) 136 - 145 mmol/L    Potassium reflex Magnesium 2.9 (LL) 3.5 - 5.1 mmol/L    Chloride 73 (L) 99 - 110 mmol/L    CO2 19 (L) 21 - 32 mmol/L    Anion Gap 14 3 - 16    Glucose 96 70 - 99 mg/dL    BUN 12 7 - 20 mg/dL    Creatinine 0.8 (L) 0.9 - 1.3 mg/dL    Est, Glom Filt Rate >60 >60    Calcium 7.8 (L) 8.3 - 10.6 mg/dL   Magnesium    Collection Time: 02/23/24  3:20 AM   Result Value Ref Range    Magnesium 1.90 1.80 - 2.40 mg/dL   Clostridium Difficile Toxin/Antigen    Collection Time: 02/23/24  5:45 AM    Specimen: Stool   Result Value Ref Range    C.diff Toxin/Antigen       Negative for Clostridium difficile antigen and toxin  Normal Range: Negative     Blood Occult Stool Diagnostic    Collection Time: 02/23/24  5:50 AM   Result Value Ref Range    Occult Blood Diagnostic Result: POSITIVE  Normal range: Negative   (A)    Basic Metabolic Panel w/ Reflex to MG    Collection Time: 02/23/24  7:30 AM   Result Value Ref Range    Sodium 115 (LL) 136 - 145 mmol/L    Potassium reflex Magnesium 3.7 3.5 - 5.1 mmol/L    Chloride 83 (L) 99 - 110 mmol/L    CO2 21 21 - 32 
hr  Cont IV Cefepime  2 gm Q 12 hr   Cont Doxycycline 100 mg Q 12 hr  Check Blood cx repeat n process   TTE -ve   May need CT chest with contrast  May need WBC nuclear scan  d/w Micro lab   Nephrology notes reviewed         Discussed with patient/Family and Nursing     Medical Decision Making:  The following items were considered in medical decision making:  Discussion of patient care with other providers  Reviewed clinical lab tests  Reviewed radiology tests  Reviewed other diagnostic tests/interventions  Independent review of radiologic images  Independent review of  Microbiology cultures and other micro tests reviewed     Risk of Complications/Morbidity: High      Illness(es)/ Infection present that pose threat to bodily function.   There is potential for severe exacerbation of infection/side effects of treatment.  Therapy requires intensive monitoring for antimicrobial agent toxicity.     Thanks for allowing me to participate in your patient's care please call me with any questions or concerns.    Dr. Murray Freed MD  Infectious Disease  [facility] Physician  Phone: [phone]   Fax : [phone]    
infection        Spoke to microbiology lab unable to speciate here with send out to [facility] for identification and sensitivity testing    TTE negative but noted to have mild elevation of the aorta      CT chest with extensive groundglass opacities concerning for pneumonia versus fluid    Acinetobacter isolated in the blood cultures as per report from [facility]   will adjust antibiotic therapy transthoracic echo negative for vegetation CT chest no concern for aortic dilatation      Acinetobacter sensitivities noted and on appropriate IV abx tehrapy now on steroids for Pneumonitis     Respiratory status improving now able to come off nasal cannula tolerating antibiotic therapy okay follow-up blood cultures negative will choose oral antibiotic for discharge planning    Labs, Microbiology, Radiology and pertinent results from current hospitalization and care every where were reviewed by me as a part of the consultation.    PLAN :  Cont  IV ampicillin-sulbactam 3 g every 6 hours will cover Acinetobacter and aspiration  D/c  Doxycycline  Check Blood cx repeat n process so far negative  TTE -ve but EF at  50%    CT chest with contrast extensive groundglass opacities with bilateral pneumonia  May need WBC nuclear scan  Nephrology notes reviewed     Blood culture isolated Acinetobacter sensitivities  see report from [facility]  Worsening respiratory status secondary to pneumonia versus fluid now improving  Bilateral groundglass opacities on the CT scan concern is for aspiration pneumonitis  Okay for DC on oral Augmentin 875 mg twice a day liquid formulation for 7 days  Discussed with patient/Family and Nursing discussed with family at bedside discussed with primary team  Medical Decision Making:  The following items were considered in medical decision making:  Discussion of patient care with other providers  Reviewed clinical lab tests  Reviewed radiology tests  Reviewed other diagnostic tests/interventions  Independent review of

## 2024-03-05 NOTE — CARE COORDINATION
Case Management Discharge Note          Date / Time of Note: 3/5/2024 10:03 AM                  Patient Name: Adama Tello   YOB: 1970  Diagnosis: Hypomagnesemia [E83.42]  Hyponatremia [E87.1]  Thrombocytopenia (HCC) [D69.6]  Altered mental status, unspecified altered mental status type [R41.82]  Pneumonia due to infectious organism, unspecified laterality, unspecified part of lung [J18.9]   Date / Time: 2/22/2024  7:05 PM    Financial:  Payor: [facility] / Plan: [facility] MEDICAID / Product Type: *No Product type* /      Pharmacy:    [address] [phone] - F [phone]  [address] [place] [address]  Phone: [phone] Fax: [phone]      Assistance purchasing medications?: Potential Assistance Purchasing Medications: No  Assistance provided by Case Management: None at this time    DISCHARGE Disposition: Home- No Services Needed        Referrals made at DISCHARGE for outpatient continued care:  [facility]    Transportation:  Transportation PLAN for discharge: family   Mode of Transport: Private Car  Reason for medical transport: Not Applicable  Name of Transport Company: Not Applicable  Time of Transport: today    Transport form completed: Not Indicated    IMM Completed:   Not Indicated    Additional CM Notes: Pt will dc home with mother,   Referral made to [facility] waiver program for additional services and supports at home.   [facility] or Care [facility] should follow and reach out to patient/mother within the next 2 weeks for an assessment.     The Plan for Transition of Care is related to the following treatment goals of Hypomagnesemia [E83.42]  Hyponatremia [E87.1]  Thrombocytopenia (HCC) [D69.6]  Altered mental status, unspecified altered mental status type [R41.82]  Pneumonia due to infectious organism, unspecified laterality, unspecified part of lung [J18.9]    The Patient and/or patient representative 
Chart review done, nursing rounds completed. Pt is being seen by nephro, ID, GI. Following for needs. Nilda Lugo is following for ETOH use.     Julien Coats LMSW, [facility] Social Work Case Management   Phone: [phone]  Fax: [phone]   
Chart review done, nursing rounds completed. Pt is still on 20L vapotherm, has TBI, and desats overnight. Met with mother, and gave choice list, she will take a look and let SW know of what referrals to place. She did mention [facility]. Will place referral.     The Plan for Transition of Care is related to the following treatment goals: to get the strength and rehab I need    The Patient and/or patient representative mother- Gaby was provided with a choice of provider and agrees   with the discharge plan. [x] Yes [] No    Freedom of choice list was provided with basic dialogue that supports the patient's individualized plan of care/goals, treatment preferences and shares the quality data associated with the providers. [x] Yes [] No    Electronically signed by JORGE Koo on 2/29/2024 at 2:01 PM            Julien Coats LMSW, [facility] Social Work Case Management   Phone: [phone]  Fax: [phone]   
Kaylah Registered Nurse  with [facility] #[phone]  Following patient for discharge planning and care coordination.   Requested call regarding any discharge planning needs.   Electronically signed by JORGE Patton on 2/28/2024 at 4:03 PM    
Met with pt's mother in room, and she is still reviewing for other choices as well. Did inform to call SW if have referrals want place, can leave VM as well. Will touch base next week, and also see if pt medical status improves.     Received call from [facility], informing that they will follow for admission for snf, pending that pt improves with high flow oxygen, and IV needs.     SW will follow for needs.     Julien Coats LMSW, [facility] Social Work Case Management   Phone: [phone]  Fax: [phone]   
RICKY completed chart review, nursing rounds completed. ID recs for several more days, will need IVABs at ID.     Julien Coats LMSW, [facility] Social Work Case Management   Phone: [phone]  Fax: [phone]   
RICKY spoke with Deb at [facility].   Informed that pt is no longer on O2, and will be dc with PO antibiotics.   Deb needs updated therapy to start precert.   RICKY left VM for therapy, to see if they can see pt.     Also will have MD place consult for therapy to see.     Julien Coats, DELFINO, [facility] Social Work Case Management   Phone: [phone]  Fax: [phone]   
Sw requested that therapy see pt, as initial plan was to go to rehab at [facility] and they needed updated therapy for precert with [facility].    Therapy saw patient, and scored a 20/24 on the AM-PAC ADL Inpatient form.  At this time, no further PT/OT is recommended upon discharge due to no needs.  Recommend patient returns to prior setting with prior services.    Informed Deb at [facility] to disregard. Spoke with pt's mother, and she had concerns for caring for patient, and SW informed that with therapy, he is scoring good for home, no needs, and he no longer is requiring IVABs, so therefore, insurance will not cover for him to go to rehab for therapy.     SW would of made a referral for COA, but there is no programs for his age. Did mention to pt, to try and reach his [facility] insurance and see if they may be able to have a  follow.     Pt is safe to dc home.     Julien Coats, DELFINO, [facility] Social Work Case Management   Phone: [phone]  Fax: [phone]            
who? Yes (mother, Gaby)  Plans to Return to Present Housing: Unknown at present  Other Identified Issues/Barriers to RETURNING to current housing: none  Potential Assistance needed at discharge: Other (Comment) (ETOH resources)            Potential DME:    Patient expects to discharge to: Apartment  Plan for transportation at discharge: Family    Financial    [facility] / Plan: [facility] [place] MEDICA[facility] / Product Type: *No Product type* /     Does insurance require precert for SNF: Yes    Potential assistance Purchasing Medications: No  Meds-to-Beds request: Yes      [address] - [facility] [phone] - F [phone]  [address] [place] [address]  Phone: [phone] Fax: [phone]      Notes:    Factors facilitating achievement of predicted outcomes: Family support    Barriers to discharge: Medical complications    Additional Case Management Notes: Patient from home with mother prior to medical admission. At time of initial case management assessment, patient was resting quietly in bed with eyes closed, RR WDL. This RN CM spoke with patient's mother/emergency contact (Gaby) at bedside.     Gaby states that patient has a hx of ETOH abuse and has generally stopped taking care of himself. As a result, he has moved in with her in the past several months. Gaby is very interested in what resources would be available to help her son get back on his feet.    This RN CM placed referral to Nilda Cobb with [facility] on behalf of patient's mother. CM to continue to follow and discharge plan with the patient, as appropriate. Patient will likely return home when medically stable for discharge.    The Plan for Transition of Care is related to the following treatment goals of Hypomagnesemia [E83.42]  Hyponatremia [E87.1]  Thrombocytopenia (HCC) [D69.6]  Altered mental status, unspecified altered mental status type [R41.82]  Pneumonia due to

## 2024-03-06 ENCOUNTER — HOSPITAL ENCOUNTER (EMERGENCY)
Age: 54
Discharge: HOME | End: 2024-03-06
Payer: COMMERCIAL

## 2024-03-06 VITALS
DIASTOLIC BLOOD PRESSURE: 99 MMHG | RESPIRATION RATE: 20 BRPM | TEMPERATURE: 97.6 F | OXYGEN SATURATION: 100 % | SYSTOLIC BLOOD PRESSURE: 142 MMHG | HEART RATE: 100 BPM | WEIGHT: 201.72 LBS | HEIGHT: 74 IN | BODY MASS INDEX: 25.89 KG/M2

## 2024-03-06 DIAGNOSIS — R62.7 FAILURE TO THRIVE IN ADULT: Primary | ICD-10-CM

## 2024-03-06 PROCEDURE — 99282 EMERGENCY DEPT VISIT SF MDM: CPT

## 2024-03-06 ASSESSMENT — PAIN - FUNCTIONAL ASSESSMENT: PAIN_FUNCTIONAL_ASSESSMENT: NONE - DENIES PAIN

## 2024-03-06 ASSESSMENT — PAIN SCALES - GENERAL: PAINLEVEL_OUTOF10: 0

## 2024-03-06 NOTE — ED PROVIDER NOTES
has a past medical history of Esophageal achalasia, GERD (gastroesophageal reflux disease), Hypertension, Hypertension, Marfan syndrome, and Traumatic brain injury (HCC).    CONSULTS: (Who and What was discussed)  IP CONSULT TO HOME CARE NEEDS      Records Reviewed (External and Source)     CC/HPI Summary, DDx, ED Course, and Reassessment:     54-year-old presents ED with concern for difficulty with care at home.  See HPI for presentation.  No physical complaints today.  Established with PCP who will workup patient for dementia.    Social work consulted, comes at bedside and provides patient with resources, is able to set up home health nursing.  Patient and family without other concerns and agreeable to discharge    Disposition Considerations (tests considered but not done, Admit vs D/C, Shared Decision Making, Pt Expectation of Test or Tx.): abive     The patient tolerated their visit well.  The patient and / or the family were informed of the results of any tests, a time was given to answer questions, a plan was proposed and they agreed with plan.    I am the Primary Clinician of Record.  FINAL IMPRESSION      1. Failure to thrive in adult          DISPOSITION/PLAN     DISPOSITION Decision To Discharge 03/06/2024 02:06:14 PM      PATIENT REFERRED TO:  Bladimir Sierra MD  [address] [facility]  [address]  [facility] [id number] [phone]    Call in 1 day      [facility]  [address].  [address]  [facility] [id number] [phone]            DISCHARGE MEDICATIONS:  Discharge Medication List as of 3/6/2024  2:49 PM          DISCONTINUED MEDICATIONS:  Discharge Medication List as of 3/6/2024  2:49 PM                 (Please note that portions of this note were completed with a voice recognition program.  Efforts were made to edit the dictations but occasionally words are mis-transcribed.)    EDY Matos CNP (electronically signed)        Ariel Gaming APRN - CNP  03/06/24 [id number]

## 2024-03-06 NOTE — CARE COORDINATION
RICYK received phone call back from Leeanne at [facility] stating that they no longer accept [facility] for payment. RICKY updated notes to reflect, thanked her for taking the time to consider this referral and advised that we already found staffing.     Respectfully submitted,    Patricia PATEL, TANIA  [facility]   [phone]    Electronically signed by JORGE Tran, LSW on 3/6/2024 at 3:01 PM

## 2024-03-06 NOTE — CARE COORDINATION
Count includes the [facility]    Referral received from  to follow for home care services.   Count includes the [facility] unable to staff timely, will require alternate agency, no preference, CM aware.     Msg left with Berny at [facility], waiting on response.     Moshe Gonzalez RN, BSN CTN  Count includes the [facility] [phone]

## 2024-03-06 NOTE — CARE COORDINATION
/      Case Management Discharge Note          Date / Time of Note: 3/6/2024 2:47 PM                  Patient Name: Adama Tello   YOB: 1970  Diagnosis: No admission diagnoses are documented for this encounter.   Date / Time: 3/6/2024  1:30 PM    Financial:  Payor: [employer] / Plan: [facility] MEDICAID / Product Type: *No Product type* /      Pharmacy:    [place] [phone] - F [phone]  [address] [facility] [address]  Phone: [phone] Fax: [phone]      Assistance purchasing medications?:    Assistance provided by Case Management: None at this time    DISCHARGE Disposition: Home with Home Health Care    Home Care:  Home Care ordered at discharge: Yes  Home Care Agency: [facility]  Phone: [phone]  Fax: [phone]  Orders faxed: Yes    Referrals made at DISCHARGE for outpatient continued care:  [place] [facility]aiver Program. SW faxed form [phone] to medicaid office to start the process.     Transportation:  Transportation PLAN for discharge: family   Mode of Transport: Private Car  Time of Transport: TBD by patient, family and/or medical staff.   Transport form completed: Not Indicated    IMM Completed: Not Indicated    Additional CM Notes: SW met with family and they are in agreement with the plan for continued care listed above.     The Plan for Transition of Care is related to the following treatment goals of No admission diagnoses are documented for this encounter.    Respectfully submitted,    TANIA Sanders  [facility] [place]   [phone]    Electronically signed by JORGE Tran, LONA on 3/6/2024 at 2:47 PM

## 2024-03-06 NOTE — DISCHARGE INSTR - COC
Continuity of Care Form    Patient Name: Adama Tello   :  1970  MRN:  [id number]    Admit date:  N/A  Discharge date:  3/6/2024    Code Status Order: Prior   Advance Directives:     Admitting Physician:  No admitting provider for patient encounter.  PCP: Bladimir Sierra MD    Discharging Nurse: Emergency room RONI nurses  Discharging Hospital Unit/Room#: RONI-036/RONI-36  Discharging Unit Phone Number: [phone]    Emergency Contact:   Extended Emergency Contact Information  Primary Emergency Contact: Gaby Tello  Address: [address] [place] [address] [place] [address]  Mobile Phone: [phone]  Relation: Parent  Secondary Emergency Contact: Easton tello  Mobile Phone: [phone]  Relation: Brother/Sister  Preferred language: English    Past Surgical History:  Past Surgical History:   Procedure Laterality Date    AORTIC ROOT REPLACEMENT      CRANIOTOMY      UPPER GASTROINTESTINAL ENDOSCOPY N/A 2023    EGD DILATION BALLOON performed by Ryan Tracy MD at [facility] ENDOSCOPY       Immunization History:     There is no immunization history on file for this patient.    Active Problems:  Patient Active Problem List   Diagnosis Code    Intractable nausea and vomiting R11.2    N&V (nausea and vomiting) R11.2    Intractable hiccups R06.6    HTN (hypertension) I10    Nausea & vomiting R11.2    Hyponatremia E87.1    Bilateral pneumonia J18.9    Sepsis (HCC) A41.9    Altered mental status R41.82    Hypomagnesemia E83.42    Thrombocytopenia (HCC) D69.6    Bacteremia R78.81    Fever and chills R50.9    Hx of repair of aortic root Z98.890    Pneumonia due to infectious organism J18.9    Acute respiratory failure with hypoxia (formerly [facility]) J96.01    Abnormal CT of the chest R93.89    Infection due to Acinetobacter species A49.8    Marfan's syndrome Q87.40       Isolation/Infection:   Isolation            No Isolation          Patient Infection Status       None to display                     Nurse Assessment:  Last

## 2024-03-06 NOTE — CARE COORDINATION
RICKY met with patient, mother and brother Obi at bedside today to review most recent discharge.     He was discharged with a referral to the [facility] fast track program but this was incorrect. He is under 60. SW corrected by sending a referral to the [place] [facility] and [facility] Waiver Program today.     SW reviewed most recent therapy notes and they did indicate that he was safe to discharge home. Family indicates that patient is temporarily staying with his mother but normally resides at [address] [place], [place] [address].Mom stated that he still has some trouble with medication management and would like a home care nurse to start home care at her home with the hopes that we can transition to his home address listed above.     SW working on finding a home care provider. SW did advise family that we will continue with our full medical workup and it if is not medically necessary to keep him he will be discharged to home with home care.     SW to provide family with a Waiver Services fact sheet before departure.     Respectfully submitted,    TANIA Sanders  [facility]   [phone]    Electronically signed by JORGE Tran, LONA on 3/6/2024 at 2:02 PM

## 2024-03-06 NOTE — CARE COORDINATION
RICKY reached out to the following providers via telephone to secure a home care agency:    [facility]-Left message for Leeanne Figueroa in intake today. [phone].    [facility]-Spoke to Rajwinder Brunner in intake. For future reference she can be reached at [phone]. She stated that they accept his insurance on a case by case basis. SW explained that he does not need IVAB or wound care and lives in zip code [facility]. She will send his info over to her team and call us back with a decision.     [facility]-RICKY reached out to Berny Barreto in admissions [phone]. He stated that he has two spots open for [facility] and they can accept. They will pull orders and WILD from epic.    Respectfully submitted,    Patricia PATEL, TANIA  [facility] [place]   [phone]    Electronically signed by JORGE Tran, LONA on 3/6/2024 at 2:36 PM

## 2024-03-06 NOTE — CARE COORDINATION
A quick chart review reveals a recently discharged patient back in the ED. He is from home. We referred him to [facility] on 3/4/24 but he likely hasn't heard anything yet.     Bladimir Sierra MD is his primary care physician. If there is no need to admit he can transition home with home care nursing.     SW will arrive at bedside momentarily to speak with family.    Respectfully submitted,    Patricia PATEL, TANIA  [facility]   [phone]    Electronically signed by JORGE Tran, LONA on 3/6/2024 at 1:38 PM

## 2024-03-06 NOTE — DISCHARGE INSTRUCTIONS
Please follow-up with family doctor and [facility] as discussed by social work.  Please follow-up with home health care as discussed by social work.

## 2024-06-20 NOTE — PLAN OF CARE
0200: Dr. Elizalde paged due to elevated sodium on 0200 lab draw. 3% saline stopped. Draw in 2 hours and call if <120 or >125, otherwise okay to leave 3% off if Na falls within range of 120-125 per Dr. Elizalde.         
  Problem: Discharge Planning  Goal: Discharge to home or other facility with appropriate resources  2/25/2024 1255 by Dory Smyth RN  Outcome: Progressing  Flowsheets (Taken 2/25/2024 0830)  Discharge to home or other facility with appropriate resources: Identify barriers to discharge with patient and caregiver  2/25/2024 0606 by Phuong Villa RN  Outcome: Progressing  Flowsheets (Taken 2/24/2024 2000)  Discharge to home or other facility with appropriate resources:   Identify barriers to discharge with patient and caregiver   Arrange for needed discharge resources and transportation as appropriate   Identify discharge learning needs (meds, wound care, etc)   Refer to discharge planning if patient needs post-hospital services based on physician order or complex needs related to functional status, cognitive ability or social support system     Problem: Pain  Goal: Verbalizes/displays adequate comfort level or baseline comfort level  2/25/2024 1255 by Dory Smyth RN  Outcome: Progressing  Flowsheets (Taken 2/25/2024 0800)  Verbalizes/displays adequate comfort level or baseline comfort level: Encourage patient to monitor pain and request assistance  2/25/2024 0606 by Phuong Villa RN  Outcome: Progressing  Flowsheets (Taken 2/24/2024 1650 by Dory Smyth RN)  Verbalizes/displays adequate comfort level or baseline comfort level: Encourage patient to monitor pain and request assistance     Problem: Skin/Tissue Integrity  Goal: Absence of new skin breakdown  Description: 1.  Monitor for areas of redness and/or skin breakdown  2.  Assess vascular access sites hourly  3.  Every 4-6 hours minimum:  Change oxygen saturation probe site  4.  Every 4-6 hours:  If on nasal continuous positive airway pressure, respiratory therapy assess nares and determine need for appliance change or resting period.  2/25/2024 1255 by Dory Smyth RN  Outcome: Progressing  2/25/2024 0606 by Phuong Villa 
  Problem: Discharge Planning  Goal: Discharge to home or other facility with appropriate resources  2/26/2024 0232 by Phuong Villa RN  Outcome: Progressing  Flowsheets (Taken 2/25/2024 2000)  Discharge to home or other facility with appropriate resources:   Identify barriers to discharge with patient and caregiver   Arrange for needed discharge resources and transportation as appropriate   Identify discharge learning needs (meds, wound care, etc)   Refer to discharge planning if patient needs post-hospital services based on physician order or complex needs related to functional status, cognitive ability or social support system  2/25/2024 1255 by Dory Smyth RN  Outcome: Progressing  Flowsheets (Taken 2/25/2024 0830)  Discharge to home or other facility with appropriate resources: Identify barriers to discharge with patient and caregiver     Problem: Pain  Goal: Verbalizes/displays adequate comfort level or baseline comfort level  2/26/2024 0232 by Phuong Villa RN  Outcome: Progressing  2/25/2024 1255 by Dory Smyth RN  Outcome: Progressing  Flowsheets (Taken 2/25/2024 0800)  Verbalizes/displays adequate comfort level or baseline comfort level: Encourage patient to monitor pain and request assistance     Problem: Skin/Tissue Integrity  Goal: Absence of new skin breakdown  Description: 1.  Monitor for areas of redness and/or skin breakdown  2.  Assess vascular access sites hourly  3.  Every 4-6 hours minimum:  Change oxygen saturation probe site  4.  Every 4-6 hours:  If on nasal continuous positive airway pressure, respiratory therapy assess nares and determine need for appliance change or resting period.  2/26/2024 0232 by Phuong Villa RN  Outcome: Progressing  2/25/2024 1255 by Dory Smyth RN  Outcome: Progressing     Problem: Safety - Adult  Goal: Free from fall injury  2/26/2024 0232 by Phuong Villa RN  Outcome: Progressing  2/25/2024 1255 by Dory Smyth 
  Problem: Discharge Planning  Goal: Discharge to home or other facility with appropriate resources  3/2/2024 1817 by Ashtyn Frias RN  Outcome: Progressing     Problem: Pain  Goal: Verbalizes/displays adequate comfort level or baseline comfort level  3/2/2024 1817 by Ashtyn Frias RN  Outcome: Progressing     Problem: Skin/Tissue Integrity  Goal: Absence of new skin breakdown  Description: 1.  Monitor for areas of redness and/or skin breakdown  2.  Assess vascular access sites hourly  3.  Every 4-6 hours minimum:  Change oxygen saturation probe site  4.  Every 4-6 hours:  If on nasal continuous positive airway pressure, respiratory therapy assess nares and determine need for appliance change or resting period.  3/2/2024 1817 by Ashtyn Frias RN  Outcome: Progressing     Problem: Safety - Adult  Goal: Free from fall injury  3/2/2024 1817 by Ashtyn Frias RN  Outcome: Progressing     Problem: ABCDS Injury Assessment  Goal: Absence of physical injury  3/2/2024 1817 by Ashtyn Frias RN  Outcome: Progressing     Problem: Neurosensory - Adult  Goal: Achieves stable or improved neurological status  3/2/2024 1817 by Ashtyn Frias RN  Outcome: Progressing     Problem: Cardiovascular - Adult  Goal: Maintains optimal cardiac output and hemodynamic stability  3/2/2024 1817 by Ashtyn Frias RN  Outcome: Progressing     Problem: Musculoskeletal - Adult  Goal: Return mobility to safest level of function  3/2/2024 1817 by Ashtyn Frias RN  Outcome: Progressing     Problem: Gastrointestinal - Adult  Goal: Minimal or absence of nausea and vomiting  3/2/2024 1817 by Ashtyn Frias RN  Outcome: Progressing     Problem: Genitourinary - Adult  Goal: Absence of urinary retention  3/2/2024 1817 by Ashtyn Frias RN  Outcome: Progressing     Problem: Decision Making  Goal: Pt/Family able to effectively weigh alternatives and participate in decision making related to treatment and care  Description: INTERVENTIONS:  1. Determine when 
  Problem: Discharge Planning  Goal: Discharge to home or other facility with appropriate resources  3/3/2024 0038 by Vane Devine RN  Outcome: Progressing  Flowsheets (Taken 3/2/2024 2136)  Discharge to home or other facility with appropriate resources: Identify barriers to discharge with patient and caregiver  3/2/2024 1817 by Ashtyn Frias RN  Outcome: Progressing  3/2/2024 1816 by Ashtyn Frias RN  Outcome: Progressing     Problem: Pain  Goal: Verbalizes/displays adequate comfort level or baseline comfort level  3/3/2024 0038 by Vane Devine RN  Outcome: Progressing  3/2/2024 1817 by Ashtyn Frias RN  Outcome: Progressing  3/2/2024 1816 by Ashtyn Frias RN  Outcome: Progressing     Problem: Skin/Tissue Integrity  Goal: Absence of new skin breakdown  Description: 1.  Monitor for areas of redness and/or skin breakdown  2.  Assess vascular access sites hourly  3.  Every 4-6 hours minimum:  Change oxygen saturation probe site  4.  Every 4-6 hours:  If on nasal continuous positive airway pressure, respiratory therapy assess nares and determine need for appliance change or resting period.  3/3/2024 0038 by Vane Devine RN  Outcome: Progressing  3/2/2024 1817 by Ashtyn Frias RN  Outcome: Progressing  3/2/2024 1816 by Ashtyn Frias RN  Outcome: Progressing     Problem: Safety - Adult  Goal: Free from fall injury  3/3/2024 0038 by Vane Devine RN  Outcome: Progressing  3/2/2024 1817 by Ashtyn Frias RN  Outcome: Progressing  3/2/2024 1816 by Ashtyn Frias RN  Outcome: Progressing     Problem: ABCDS Injury Assessment  Goal: Absence of physical injury  3/3/2024 0038 by Vane Devine RN  Outcome: Progressing  3/2/2024 1817 by Ashtyn Frias RN  Outcome: Progressing  3/2/2024 1816 by Ashtyn Frias RN  Outcome: Progressing     
  Problem: Discharge Planning  Goal: Discharge to home or other facility with appropriate resources  3/4/2024 1346 by Hanna Mcintosh RN  Outcome: Completed  Flowsheets (Taken 3/4/2024 0941)  Discharge to home or other facility with appropriate resources:   Identify barriers to discharge with patient and caregiver   Arrange for needed discharge resources and transportation as appropriate   Identify discharge learning needs (meds, wound care, etc)  3/4/2024 0529 by Edita Woo, RN  Outcome: Progressing  Flowsheets (Taken 3/3/2024 2021)  Discharge to home or other facility with appropriate resources: Identify barriers to discharge with patient and caregiver     Problem: Pain  Goal: Verbalizes/displays adequate comfort level or baseline comfort level  3/4/2024 1346 by Hanna Mcintosh RN  Outcome: Completed  3/4/2024 0529 by Edita Woo, RN  Outcome: Progressing  Flowsheets (Taken 3/4/2024 0529)  Verbalizes/displays adequate comfort level or baseline comfort level:   Assess pain using appropriate pain scale   Encourage patient to monitor pain and request assistance   Administer analgesics based on type and severity of pain and evaluate response   Implement non-pharmacological measures as appropriate and evaluate response     Problem: Skin/Tissue Integrity  Goal: Absence of new skin breakdown  Description: 1.  Monitor for areas of redness and/or skin breakdown  2.  Assess vascular access sites hourly  3.  Every 4-6 hours minimum:  Change oxygen saturation probe site  4.  Every 4-6 hours:  If on nasal continuous positive airway pressure, respiratory therapy assess nares and determine need for appliance change or resting period.  Outcome: Completed     Problem: Safety - Adult  Goal: Free from fall injury  Outcome: Completed     Problem: ABCDS Injury Assessment  Goal: Absence of physical injury  Outcome: Completed     Problem: Neurosensory - Adult  Goal: Achieves stable or improved neurological status  3/4/2024 1346 by 
  Problem: Discharge Planning  Goal: Discharge to home or other facility with appropriate resources  Outcome: Progressing  Flowsheets  Taken 2/23/2024 2000 by Dulce Monreal RN  Discharge to home or other facility with appropriate resources:   Identify barriers to discharge with patient and caregiver   Identify discharge learning needs (meds, wound care, etc)     Problem: Pain  Goal: Verbalizes/displays adequate comfort level or baseline comfort level  Outcome: Progressing  Flowsheets (Taken 2/23/2024 2000)  Verbalizes/displays adequate comfort level or baseline comfort level:   Encourage patient to monitor pain and request assistance   Assess pain using appropriate pain scale   Administer analgesics based on type and severity of pain and evaluate response   Implement non-pharmacological measures as appropriate and evaluate response     Problem: Skin/Tissue Integrity  Goal: Absence of new skin breakdown  Description: 1.  Monitor for areas of redness and/or skin breakdown  2.  Assess vascular access sites hourly  3.  Every 4-6 hours minimum:  Change oxygen saturation probe site  4.  Every 4-6 hours:  If on nasal continuous positive airway pressure, respiratory therapy assess nares and determine need for appliance change or resting period.  Outcome: Progressing     Problem: Safety - Adult  Goal: Free from fall injury  Outcome: Progressing  Flowsheets (Taken 2/23/2024 2138)  Free From Fall Injury: Instruct family/caregiver on patient safety     Problem: ABCDS Injury Assessment  Goal: Absence of physical injury  Outcome: Progressing  Flowsheets (Taken 2/23/2024 2138)  Absence of Physical Injury: Implement safety measures based on patient assessment     
  Problem: Discharge Planning  Goal: Discharge to home or other facility with appropriate resources  Outcome: Progressing  Flowsheets  Taken 2/27/2024 0800 by Frances Mcmillan RN  Discharge to home or other facility with appropriate resources: Identify barriers to discharge with patient and caregiver    Problem: Pain  Goal: Verbalizes/displays adequate comfort level or baseline comfort level  Outcome: Progressing     Problem: Skin/Tissue Integrity  Goal: Absence of new skin breakdown  Description: 1.  Monitor for areas of redness and/or skin breakdown  2.  Assess vascular access sites hourly  3.  Every 4-6 hours minimum:  Change oxygen saturation probe site  4.  Every 4-6 hours:  If on nasal continuous positive airway pressure, respiratory therapy assess nares and determine need for appliance change or resting period.  Outcome: Progressing     Problem: Safety - Adult  Goal: Free from fall injury  Outcome: Progressing     Problem: ABCDS Injury Assessment  Goal: Absence of physical injury  Outcome: Progressing     Problem: Neurosensory - Adult  Goal: Achieves stable or improved neurological status  Outcome: Progressing  Flowsheets (Taken 2/27/2024 0800)  Achieves stable or improved neurological status: Assess for and report changes in neurological status     Problem: Cardiovascular - Adult  Goal: Maintains optimal cardiac output and hemodynamic stability  Outcome: Progressing  Flowsheets  Taken 2/27/2024 0800 by Frances Mcmillan RN  Maintains optimal cardiac output and hemodynamic stability: Monitor blood pressure and heart rate    Problem: Musculoskeletal - Adult  Goal: Return mobility to safest level of function  Outcome: Progressing  Flowsheets  Taken 2/27/2024 0800 by Frances Mcmillan RN  Return Mobility to Safest Level of Function: Assess patient stability and activity tolerance for standing, transferring and ambulating with or without assistive devices    Problem: Gastrointestinal - Adult  Goal: Minimal or absence 
  Problem: Discharge Planning  Goal: Discharge to home or other facility with appropriate resources  Outcome: Progressing  Flowsheets (Taken 2/24/2024 0830)  Discharge to home or other facility with appropriate resources: Identify barriers to discharge with patient and caregiver     Problem: Pain  Goal: Verbalizes/displays adequate comfort level or baseline comfort level  Outcome: Progressing     Problem: Skin/Tissue Integrity  Goal: Absence of new skin breakdown  Description: 1.  Monitor for areas of redness and/or skin breakdown  2.  Assess vascular access sites hourly  3.  Every 4-6 hours minimum:  Change oxygen saturation probe site  4.  Every 4-6 hours:  If on nasal continuous positive airway pressure, respiratory therapy assess nares and determine need for appliance change or resting period.  Outcome: Progressing     Problem: Safety - Adult  Goal: Free from fall injury  Outcome: Progressing     Problem: ABCDS Injury Assessment  Goal: Absence of physical injury  Outcome: Progressing     
  Problem: Discharge Planning  Goal: Discharge to home or other facility with appropriate resources  Outcome: Progressing  Flowsheets (Taken 2/24/2024 2000)  Discharge to home or other facility with appropriate resources:   Identify barriers to discharge with patient and caregiver   Arrange for needed discharge resources and transportation as appropriate   Identify discharge learning needs (meds, wound care, etc)   Refer to discharge planning if patient needs post-hospital services based on physician order or complex needs related to functional status, cognitive ability or social support system     Problem: Pain  Goal: Verbalizes/displays adequate comfort level or baseline comfort level  Outcome: Progressing  Flowsheets (Taken 2/24/2024 1650 by Dory Smyth, RN)  Verbalizes/displays adequate comfort level or baseline comfort level: Encourage patient to monitor pain and request assistance     Problem: Skin/Tissue Integrity  Goal: Absence of new skin breakdown  Description: 1.  Monitor for areas of redness and/or skin breakdown  2.  Assess vascular access sites hourly  3.  Every 4-6 hours minimum:  Change oxygen saturation probe site  4.  Every 4-6 hours:  If on nasal continuous positive airway pressure, respiratory therapy assess nares and determine need for appliance change or resting period.  Outcome: Progressing     Problem: Safety - Adult  Goal: Free from fall injury  Outcome: Progressing     Problem: ABCDS Injury Assessment  Goal: Absence of physical injury  Outcome: Progressing     
  Problem: Discharge Planning  Goal: Discharge to home or other facility with appropriate resources  Outcome: Progressing  Flowsheets (Taken 2/28/2024 2023)  Discharge to home or other facility with appropriate resources: Identify barriers to discharge with patient and caregiver     Problem: Pain  Goal: Verbalizes/displays adequate comfort level or baseline comfort level  Outcome: Progressing     Problem: Skin/Tissue Integrity  Goal: Absence of new skin breakdown  Description: 1.  Monitor for areas of redness and/or skin breakdown  2.  Assess vascular access sites hourly  3.  Every 4-6 hours minimum:  Change oxygen saturation probe site  4.  Every 4-6 hours:  If on nasal continuous positive airway pressure, respiratory therapy assess nares and determine need for appliance change or resting period.  Outcome: Progressing     Problem: Safety - Adult  Goal: Free from fall injury  Outcome: Progressing     Problem: ABCDS Injury Assessment  Goal: Absence of physical injury  Outcome: Progressing     Problem: Neurosensory - Adult  Goal: Achieves stable or improved neurological status  Outcome: Progressing  Flowsheets (Taken 2/28/2024 2023)  Achieves stable or improved neurological status: Assess for and report changes in neurological status     Problem: Cardiovascular - Adult  Goal: Maintains optimal cardiac output and hemodynamic stability  Outcome: Progressing  Flowsheets (Taken 2/28/2024 2023)  Maintains optimal cardiac output and hemodynamic stability: Monitor blood pressure and heart rate     Problem: Musculoskeletal - Adult  Goal: Return mobility to safest level of function  Outcome: Progressing  Flowsheets (Taken 2/28/2024 2023)  Return Mobility to Safest Level of Function: Assess patient stability and activity tolerance for standing, transferring and ambulating with or without assistive devices     Problem: Gastrointestinal - Adult  Goal: Minimal or absence of nausea and vomiting  Outcome: Progressing  Flowsheets 
  Problem: Discharge Planning  Goal: Discharge to home or other facility with appropriate resources  Outcome: Progressing  Flowsheets (Taken 3/3/2024 2021)  Discharge to home or other facility with appropriate resources: Identify barriers to discharge with patient and caregiver     Problem: Pain  Goal: Verbalizes/displays adequate comfort level or baseline comfort level  Outcome: Progressing  Flowsheets (Taken 3/4/2024 4465)  Verbalizes/displays adequate comfort level or baseline comfort level:   Assess pain using appropriate pain scale   Encourage patient to monitor pain and request assistance   Administer analgesics based on type and severity of pain and evaluate response   Implement non-pharmacological measures as appropriate and evaluate response     Problem: Neurosensory - Adult  Goal: Achieves stable or improved neurological status  Outcome: Progressing  Flowsheets (Taken 3/3/2024 2021)  Achieves stable or improved neurological status: Assess for and report changes in neurological status     Problem: Cardiovascular - Adult  Goal: Maintains optimal cardiac output and hemodynamic stability  Outcome: Progressing  Flowsheets (Taken 3/3/2024 2021)  Maintains optimal cardiac output and hemodynamic stability: Monitor blood pressure and heart rate     Problem: Gastrointestinal - Adult  Goal: Minimal or absence of nausea and vomiting  Outcome: Progressing  Flowsheets (Taken 3/3/2024 0901 by Daiana Winston, RN)  Minimal or absence of nausea and vomiting: Administer ordered antiemetic medications as needed     
  Problem: Pain  Goal: Verbalizes/displays adequate comfort level or baseline comfort level  Outcome: Progressing     Problem: Skin/Tissue Integrity  Goal: Absence of new skin breakdown  Description: 1.  Monitor for areas of redness and/or skin breakdown  2.  Assess vascular access sites hourly  3.  Every 4-6 hours minimum:  Change oxygen saturation probe site  4.  Every 4-6 hours:  If on nasal continuous positive airway pressure, respiratory therapy assess nares and determine need for appliance change or resting period.  Outcome: Progressing     Problem: Neurosensory - Adult  Goal: Achieves stable or improved neurological status  Outcome: Progressing     Problem: Cardiovascular - Adult  Goal: Maintains optimal cardiac output and hemodynamic stability  Outcome: Progressing     
Pain/discomfort being managed with PRN analgesics per MD orders. Pt able to express presence and absence of pain and rate pain appropriately using numerical scale.  Fall risk assessment completed every shift. All precautions in place. Pt has call light within reach at all times. Room clear of clutter. Pt aware to call for assistance when getting up.     Intake/Output Summary (Last 24 hours) at 3/1/2024 0738  Last data filed at 2/29/2024 1423  Gross per 24 hour   Intake 555.46 ml   Output 350 ml   Net 205.46 ml     Vitals:    03/01/24 0700   BP: (!) 124/98   Pulse: 95   Resp: 22   Temp: 97.7 °F (36.5 °C)   SpO2: 100%     Patient assessed for fall risk; fall precautions initiated. Patient and family instructed about safety devices. Environment kept free of clutter and adequate lighting provided.  Bed locked and in lowest position.  Call light within reach. Will continue to monitor.    
weigh alternatives and participate in decision making related to treatment and care: Determine when there are differences between patient's view, family's view, and healthcare provider's view of condition     
No

## (undated) DEVICE — MOUTHPIECE ENDOSCP L CTRL OPN AND SIDE PORTS DISP

## (undated) DEVICE — GOWN AURORA NONREINF LG: Brand: MEDLINE INDUSTRIES, INC.

## (undated) DEVICE — SOLUTION IV IRRIG WATER 500ML POUR BRL ST 2F7113

## (undated) DEVICE — VALVE SUCTION AIR H2O SET ORCA POD + DISP

## (undated) DEVICE — BW-412T DISP COMBO CLEANING BRUSH: Brand: SINGLE USE COMBINATION CLEANING BRUSH

## (undated) DEVICE — ENDOSCOPIC KIT 6X3/16 FT COLON W/ 1.1 OZ 2 GWN W/O BRSH

## (undated) DEVICE — ESOPHAGEAL/PYLORIC/COLONIC/BILIARY WIREGUIDED BALLOON DILATATION CATHETER: Brand: CRE™ PRO

## (undated) DEVICE — AIR/WATER CLEANING ADAPTER FOR OLYMPUS® GI ENDOSCOPE: Brand: BULLDOG®

## (undated) DEVICE — SYRINGE INFL 60ML DISP ALLIANCE II